# Patient Record
Sex: FEMALE | Race: WHITE | NOT HISPANIC OR LATINO | Employment: PART TIME | URBAN - METROPOLITAN AREA
[De-identification: names, ages, dates, MRNs, and addresses within clinical notes are randomized per-mention and may not be internally consistent; named-entity substitution may affect disease eponyms.]

---

## 2016-09-28 LAB — PAP-ABSTRACT: NORMAL

## 2017-01-17 DIAGNOSIS — E55.9 VITAMIN D DEFICIENCY: ICD-10-CM

## 2017-01-17 DIAGNOSIS — E53.8 VITAMIN B12 DEFICIENCY (NON ANEMIC): ICD-10-CM

## 2017-01-17 DIAGNOSIS — R53.83 FATIGUE, UNSPECIFIED TYPE: ICD-10-CM

## 2017-01-17 LAB
DEPRECATED CALCIDIOL+CALCIFEROL SERPL-MC: 55 UG/L (ref 20–75)
VIT B12 SERPL-MCNC: 865 PG/ML (ref 193–986)

## 2017-01-17 PROCEDURE — 36415 COLL VENOUS BLD VENIPUNCTURE: CPT | Performed by: INTERNAL MEDICINE

## 2017-01-17 PROCEDURE — 82306 VITAMIN D 25 HYDROXY: CPT | Performed by: INTERNAL MEDICINE

## 2017-01-17 PROCEDURE — 82607 VITAMIN B-12: CPT | Performed by: INTERNAL MEDICINE

## 2017-01-24 ENCOUNTER — OFFICE VISIT (OUTPATIENT)
Dept: INTERNAL MEDICINE | Facility: CLINIC | Age: 47
End: 2017-01-24
Payer: COMMERCIAL

## 2017-01-24 VITALS
HEIGHT: 66 IN | DIASTOLIC BLOOD PRESSURE: 70 MMHG | WEIGHT: 252 LBS | HEART RATE: 92 BPM | SYSTOLIC BLOOD PRESSURE: 108 MMHG | OXYGEN SATURATION: 97 % | BODY MASS INDEX: 40.5 KG/M2 | TEMPERATURE: 97.9 F

## 2017-01-24 DIAGNOSIS — R53.83 FATIGUE, UNSPECIFIED TYPE: Primary | ICD-10-CM

## 2017-01-24 DIAGNOSIS — Z01.84 IMMUNITY STATUS TESTING: ICD-10-CM

## 2017-01-24 DIAGNOSIS — E55.9 VITAMIN D DEFICIENCY: ICD-10-CM

## 2017-01-24 DIAGNOSIS — G47.19 DAYTIME HYPERSOMNOLENCE: ICD-10-CM

## 2017-01-24 DIAGNOSIS — R79.89 LOW SERUM VITAMIN C: ICD-10-CM

## 2017-01-24 DIAGNOSIS — E53.8 VITAMIN B12 DEFICIENCY (NON ANEMIC): ICD-10-CM

## 2017-01-24 PROCEDURE — 99214 OFFICE O/P EST MOD 30 MIN: CPT | Performed by: INTERNAL MEDICINE

## 2017-01-24 NOTE — NURSING NOTE
"Chief Complaint   Patient presents with     Fatigue     f/up on labs -vitamin b12/D/C       Initial /70 mmHg  Pulse 92  Temp(Src) 97.9  F (36.6  C) (Oral)  Ht 5' 6\" (1.676 m)  Wt 252 lb (114.306 kg)  BMI 40.69 kg/m2  SpO2 97% Estimated body mass index is 40.69 kg/(m^2) as calculated from the following:    Height as of this encounter: 5' 6\" (1.676 m).    Weight as of this encounter: 252 lb (114.306 kg).  BP completed using cuff size: regular    "

## 2017-01-24 NOTE — PROGRESS NOTES
"  SUBJECTIVE:                                                    Dalia Hart is a 46 year old female who presents to clinic today for the following health issues:      Concern - fatigue   Onset: years   Description:   Worse after eating carbs  Intensity: moderate  Progression of Symptoms:  same  Accompanying Signs & Symptoms:  none   Previous history of similar problem:   none  Precipitating factors:   Worsened by: not getting enough sleep   Alleviating factors:  Improved by: melatonin and  magnesium    Therapies Tried and outcome: melatonin and magnesium       Wakes up unrefreshed and feels sleepy during the day, but overall she reports that she has felt better with regards to fatigue and muscle aches and pains since her last office visit.    Other significant issues as outlined and addressed in the plan section of this note.      Problem list and histories reviewed & adjusted, as indicated.  Additional history: as documented    Labs reviewed in EPIC  Problem list, Medication list, Allergies, and Medical/Social/Surgical histories reviewed in [facility] and updated as appropriate.    ROS:  14 point ROS negative except as above      OBJECTIVE:                                                    /70 mmHg  Pulse 92  Temp(Src) 97.9  F (36.6  C) (Oral)  Ht 5' 6\" (1.676 m)  Wt 252 lb (114.306 kg)  BMI 40.69 kg/m2  SpO2 97%  Body mass index is 40.69 kg/(m^2).  GENERAL: healthy, alert and no distress  EYES: Eyes grossly normal to inspection, PERRL and conjunctivae and sclerae normal  NECK: no adenopathy, no asymmetry, masses, or scars and thyroid normal to palpation  RESP: lungs clear to auscultation - no rales, rhonchi or wheezes  CV: regular rate and rhythm, normal S1 S2, no S3 or S4, no murmur, click or rub, no peripheral edema and peripheral pulses strong  ABDOMEN: soft, nontender, no hepatosplenomegaly, no masses and bowel sounds normal  MS: no gross musculoskeletal defects noted, no edema    Diagnostic Test " Results:  Results for orders placed or performed in visit on 01/17/17   Vitamin D Deficiency   Result Value Ref Range    Vitamin D Deficiency screening 55 20 - 75 ug/L   Vitamin B12   Result Value Ref Range    Vitamin B12 865 193 - 986 pg/mL        ASSESSMENT/PLAN:                                                      DIAGNOSIS/PLAN:     ICD-10-CM    1. Fatigue, unspecified type R53.83 cholecalciferol (VITAMIN D3) 67663 UNITS capsule     Cyanocobalamin (B-12 SUPER STRENGTH) 5000 MCG/ML LIQD     calcium-vitamin D-vitamin K (VIACTIV) 500-500-40 MG-UNT-MCG CHEW     SLEEP EVALUATION & MANAGEMENT REFERRAL - ADULT   2. Low serum vitamin C R79.89 vitamin C-electrolytes (EMERGEN-C) 1000mg vitamin C super orange drink mix    WITH BORDERLINE B6 LEVEL   3. Vitamin D deficiency E55.9 cholecalciferol (VITAMIN D3) 68184 UNITS capsule     calcium-vitamin D-vitamin K (VIACTIV) 500-500-40 MG-UNT-MCG CHEW     Vitamin D Deficiency   4. Vitamin B12 deficiency (non anemic) E53.8 Cyanocobalamin (B-12 SUPER STRENGTH) 5000 MCG/ML LIQD     Vitamin B12   5. Daytime hypersomnolence G47.19 SLEEP EVALUATION & MANAGEMENT REFERRAL - ADULT   6. Immunity status testing Z01.84 Hepatitis C Screen Reflex to HCV RNA Quant and Genotype     Hepatitis B Surface Antibody     Hepatitis B core antibody     Hepatitis A Antibody IgG       SIGNIFICANT ISSUES RE The primary encounter diagnosis was Fatigue, unspecified type. Diagnoses of Low serum vitamin C, Vitamin D deficiency, Vitamin B12 deficiency (non anemic), Daytime hypersomnolence, and Immunity status testing were also pertinent to this visit. AS NOTED AND ADDRESSED ABOVE   MEDS AND AND LABS AS ORDERED TO ADDRESS PREVIOUS AND CURRENT ABNORMAL INDICES    STILL FEELS FATIGUED, DOES HAVE RISK FACTORS FOR [facility] - WILL ORDER SLEEP TEST    SEE PATIENT INSTRUCTION SECTION FOR FOLLOW UP PLAN    Dalia IS TO CONTINUE OTHER TREATMENT REGIMEN/PLANS EXCEPT AS INDICATED    COMPLIANCE WITH MEDICATIONS DIET AND EXERCISE  PLANS ENCOURAGED    DISCONTINUED MEDS:  Medications Discontinued During This Encounter   Medication Reason     vitamin C-electrolytes (EMERGEN-C) 1000mg vitamin C super orange drink mix Reorder     cholecalciferol (VITAMIN D3) 85526 UNITS capsule Reorder     Cyanocobalamin (B-12 SUPER STRENGTH) 5000 MCG/ML LIQD Reorder     calcium-vitamin D-vitamin K (VIACTIV) 500-500-40 MG-UNT-MCG CHEW Reorder       CURRENT MED LIST WITH CHANGES AS NOTED BELOW:  Current Outpatient Prescriptions   Medication Sig Dispense Refill     vitamin C-electrolytes (EMERGEN-C) 1000mg vitamin C super orange drink mix Mix 1 packet in 4-6oz water and take once daily INDICATION: VITAMIN C SUPPLEMENTION 90 packet PRN     cholecalciferol (VITAMIN D3) 94852 UNITS capsule SIG: TAKE 1 CAP TWICE A WEEK FOR 2 WEEKS, THEN ONE CAP EVERY OTHER WEEK, INDICATION: TO TREAT VITAMIN D DEFICIENCY (1ST AND 15TH OF EACH MONTH) 40 capsule 0     Cyanocobalamin (B-12 SUPER STRENGTH) 5000 MCG/ML LIQD Place 1 mL under the tongue every morning FOR VITAMIN B12 SUPPLEMENTATION, PLEASE PLACE UNDER THE TONGUE 2 Bottle PRN     calcium-vitamin D-vitamin K (VIACTIV) 500-500-40 MG-UNT-MCG CHEW Take 1 tablet by mouth 2 times daily (with meals) INDICATION: FOR BONE AND BREAST HEALTH 100 tablet PRN     omeprazole (PRILOSEC) 20 MG capsule Take 1 capsule (20 mg) by mouth every morning (before breakfast) INDICATION: TO CONTROL REFLUX SYMPTOMS 90 capsule 3     albuterol (ALBUTEROL) 108 (90 BASE) MCG/ACT inhaler Inhale 2 puffs into the lungs every 4 hours as needed for shortness of breath / dyspnea 1 Inhaler 0     nicotine polacrilex (NICORETTE) 2 MG gum 1 piece of gum every 1-2 hours as needed for smoking cessation 100 each 1     aspirin 81 MG chewable tablet Take 81 mg by mouth daily             Patient Instructions   ** FOLLOW UP PLAN**:    PLEASE SCHEDULE LABS WITH OFFICE VISIT 3 MONTHS FROM TODAY TO FOLLOW UP ON  FATIGUE, SLEEP STUDY RESULT, OTHER MEDICAL ISSUES, AND TO REVIEW TEST  RESULTS       BE SURE TO SCHEDULE YOUR LAB DRAW APPOINTMENT FOR AT LEAST 1 WEEK BEFORE YOUR NEXT VISIT      YOU HAVE BEEN REFERRED FOR SLEEP STUDY TO ADDRESS ISSUES RAISED TODAY   PLEASE  MAKE SURE TO SCHEDULE YOUR APPOINTMENT(S) BY TELEPHONE      YOU MAY CONTACT THE CLINIC IF ANY QUESTIONS OR CONCERNS -[phone] OR VIA RUBÉN Haile MD  [facility][1]

## 2017-01-24 NOTE — PROGRESS NOTES
Quick Note:    Dear Dalia,   Your recent test results were reviewed and are within acceptable limits. Please continue with other treatment, and follow up plans as discussed and do not hesitate to contact me with any questions or concerns.  Stay healthy!    Regards,  Dr. Mic [facility]    ______

## 2017-01-24 NOTE — MR AVS SNAPSHOT
After Visit Summary   1/24/2017    Dalia Hart    MRN: [id number]           Patient Information     Date Of Birth          1970        Visit Information        Provider Department      1/24/2017 4:30 PM Johnson Haile MD [facility]        Today's Diagnoses     Fatigue, unspecified type    -  1     Low serum vitamin C         Vitamin D deficiency         Vitamin B12 deficiency (non anemic)         Daytime hypersomnolence         Immunity status testing           Care Instructions    ** FOLLOW UP PLAN**:    PLEASE SCHEDULE LABS WITH OFFICE VISIT 3 MONTHS FROM TODAY TO FOLLOW UP ON  FATIGUE, SLEEP STUDY RESULT, OTHER MEDICAL ISSUES, AND TO REVIEW TEST RESULTS       BE SURE TO SCHEDULE YOUR LAB DRAW APPOINTMENT FOR AT LEAST 1 WEEK BEFORE YOUR NEXT VISIT      YOU HAVE BEEN REFERRED FOR SLEEP STUDY TO ADDRESS ISSUES RAISED TODAY   PLEASE  MAKE SURE TO SCHEDULE YOUR APPOINTMENT(S) BY TELEPHONE      YOU MAY CONTACT THE CLINIC IF ANY QUESTIONS OR CONCERNS -[phone] OR [facility]IA [facility]                   Follow-ups after your visit        Additional Services     SLEEP EVALUATION & MANAGEMENT REFERRAL - ADULT       Please be aware that coverage of these services is subject to the terms and limitations of your health insurance plan.  Call member services at your health plan with any benefit or coverage questions.      Please bring the following to your appointment:    >>   List of current medications   >>   This referral request   >>   Any documents/labs given to you for this referral    [facility]   [phone] (Age 18 and up)                  Future tests that were ordered for you today     Open Future Orders        Priority Expected Expires Ordered    Vitamin D Deficiency Routine 1/24/2017 7/24/2017 1/24/2017    Vitamin B12 Routine 1/24/2017 7/24/2017 1/24/2017    Hepatitis C Screen Reflex to HCV RNA Quant and Genotype Routine 1/24/2017 7/24/2017  "1/24/2017    Hepatitis B Surface Antibody Routine 1/24/2017 7/24/2017 1/24/2017    Hepatitis B core antibody Routine 1/24/2017 7/24/2017 1/24/2017    Hepatitis A Antibody IgG Routine 1/24/2017 7/24/2017 1/24/2017    SLEEP EVALUATION & MANAGEMENT REFERRAL - ADULT Routine  1/24/2018 1/24/2017            Who to contact     If you have questions or need follow up information about today's clinic visit or your schedule please contact [facility] directly at [phone].  Normal or non-critical lab and imaging results will be communicated to you by [facility], letter or phone within 4 business days after the clinic has received the results. If you do not hear from us within 7 days, please contact the clinic through [facility] or phone. If you have a critical or abnormal lab result, we will notify you by phone as soon as possible.  Submit refill requests through [employer] or call your pharmacy and they will forward the refill request to us. Please allow 3 business days for your refill to be completed.          Additional Information About Your Visit        MyChart Information     [employer] gives you secure access to your electronic health record. If you see a primary care provider, you can also send messages to your care team and make appointments. If you have questions, please call your primary care clinic.  If you do not have a primary care provider, please call [phone] and they will assist you.        Care EveryWhere ID     This is your Care EveryWhere ID. This could be used by other organizations to access your [facility] medical records  [id number]        Your Vitals Were     Pulse Temperature Height BMI (Body Mass Index) Pulse Oximetry       92 97.9  F (36.6  C) (Oral) 5' 6\" (1.676 m) 40.69 kg/m2 97%        Blood Pressure from Last 3 Encounters:   01/24/17 108/70   08/29/16 130/78   06/23/16 104/60    Weight from Last 3 Encounters:   01/24/17 252 lb (114.306 kg)   08/29/16 242 lb 12.8 oz (110.133 " kg)   06/23/16 246 lb 14.4 oz (111.993 kg)                 Where to get your medicines      These medications were sent to [facility] [place], [place] - [address].  [address] [address]     Phone:  [phone]    - calcium-vitamin D-vitamin K 500-500-40 MG-UNT-MCG Chew  - cholecalciferol 58013 UNITS capsule  - Cyanocobalamin 5000 MCG/ML Liqd  - vitamin C-electrolytes 1000mg vitamin C super orange drink mix       Primary Care Provider Office Phone # Fax #    Johnson Haile [phone]       [facility] [address] [facility] [address]        Thank you!     Thank you for choosing [facility]  for your care. Our goal is always to provide you with excellent care. Hearing back from our patients is one way we can continue to improve our services. Please take a few minutes to complete the written survey that you may receive in the mail after your visit with us. Thank you!             Your Updated Medication List - Protect others around you: Learn how to safely use, store and throw away your medicines at www.disposemymeds.org.          This list is accurate as of: 1/24/17  5:41 PM.  Always use your most recent med list.                   Brand Name Dispense Instructions for use    albuterol 108 (90 BASE) MCG/ACT Inhaler    albuterol    1 Inhaler    Inhale 2 puffs into the lungs every 4 hours as needed for shortness of breath / dyspnea       aspirin 81 MG chewable tablet      Take 81 mg by mouth daily       calcium-vitamin D-vitamin K 500-500-40 MG-UNT-MCG Chew    VIACTIV    100 tablet    Take 1 tablet by mouth 2 times daily (with meals) INDICATION: FOR BONE AND BREAST HEALTH       cholecalciferol 96735 UNITS capsule    VITAMIN D3    40 capsule    SIG: TAKE 1 CAP TWICE A WEEK FOR 2 WEEKS, THEN ONE CAP EVERY OTHER WEEK, INDICATION: TO TREAT VITAMIN D DEFICIENCY (1ST AND 15TH OF EACH MONTH)       Cyanocobalamin 5000 MCG/ML Liqd     B-12 SUPER STRENGTH    2 Bottle    Place 1 mL under the tongue every morning FOR VITAMIN B12 SUPPLEMENTATION, PLEASE PLACE UNDER THE TONGUE       nicotine polacrilex 2 MG gum    NICORETTE    100 each    1 piece of gum every 1-2 hours as needed for smoking cessation       omeprazole 20 MG CR capsule    priLOSEC    90 capsule    Take 1 capsule (20 mg) by mouth every morning (before breakfast) INDICATION: TO CONTROL REFLUX SYMPTOMS       vitamin C-electrolytes 1000mg vitamin C super orange drink mix    EMERGEN-C    90 packet    Mix 1 packet in 4-6oz water and take once daily INDICATION: VITAMIN C SUPPLEMENTION

## 2017-02-03 ENCOUNTER — TELEPHONE (OUTPATIENT)
Dept: NURSING | Facility: CLINIC | Age: 47
End: 2017-02-03

## 2017-02-03 ENCOUNTER — TELEPHONE (OUTPATIENT)
Dept: INTERNAL MEDICINE | Facility: CLINIC | Age: 47
End: 2017-02-03

## 2017-02-03 DIAGNOSIS — Z20.828 EXPOSURE TO THE FLU: Primary | ICD-10-CM

## 2017-02-03 RX ORDER — OSELTAMIVIR PHOSPHATE 75 MG/1
75 CAPSULE ORAL DAILY
Qty: 10 CAPSULE | Refills: 0 | Status: SHIPPED | OUTPATIENT
Start: 2017-02-03 | End: 2017-03-07

## 2017-02-03 NOTE — TELEPHONE ENCOUNTER
Reason for Call:  Medication or medication refill:    Do you use a [facility]?  Name of the pharmacy and phone number for the current request:  [facility] [address] [place] [address] - [phone]    Name of the medication requested: Tamiflu - Child tested positive for flu today and she would like a preventive RX    Other request: Please do as soon as possible    Can we leave a detailed message on this number? YES    Phone number patient can be reached at: Cell number on file:    Telephone Information:   Mobile [phone]       Best Time: Any time    Call taken on 2/3/2017 at 4:23 PM by Rayna Jernigan

## 2017-02-04 NOTE — TELEPHONE ENCOUNTER
"Call Type: Triage Call    Presenting Problem: Mom calling\" My daughter was dx with the flu and  I had called the clinic earlier regarding tamiflu, not sure if I  need it. I do not have any sx. \" Denies sx. Gave over view on  influenza. Your RX has been called in per epic if you need it.  Triage Note:  Guideline Title: Flu-Like Symptoms  Recommended Disposition: Provide Home/Self Care  Original Inclination: Wanted to speak with a nurse  Override Disposition:  Intended Action: Follow advice given  Physician Contacted: No  Has questions/concerns about exposure to influenza ?  YES  Signs of dehydration ? NO  Severe breathing problems ? NO  Breathing problems ? NO  Dry or minimally productive cough for up to three weeks after initial symptoms ? NO  Sudden change in mental status ? NO  Choking sensation, cannot swallow own saliva with associated drooling and soft  muffled voice ? NO  New onset of eye redness, irritation/foreign body sensation or gritty feeling with  watery or sticky mucus drainage ? NO  Temperature of 101.5 F (38.6 C) or greater that has not responded to 24 hours of  home care measures ? NO  Diagnosed with influenza by provider AND has questions/concerns ? NO  New onset of stiff neck causing pain with forward head movement, severe  generalized headache, fever, or altered mental status ? NO  Any temperature elevation in an individual with a weakened immune system OR a  frail elderly person ? NO  Flu-like symptoms associated with a cough and breathing that is becoming more  difficult ? NO  Evaluated by provider AND symptoms worsening when following recommended treatment  plan for 48 hours ? NO  New OR worsening flu-like illness AND in high risk group for complications ? NO  In high risk group for complications of influenza AND has questions/concerns ? NO  Flu-like illness that has not improved after 7 days of home care ? NO  Flu-like illness AND not in a high risk group ? NO  Gradual onset of upper " respiratory illness signs and symptoms(If there is any  doubt that symptoms may be an upper respiratory illness, use this guideline,  Flu-Like Symptoms ) ? NO  Being treated by a provider for a secondary infection AND no improvement in  symptoms, symptoms have worsened OR has new symptoms after following treatment  plan for the time specified by provider. ? NO  Severe headache not relieved with 8 hours of home care ? NO  New productive cough with thick colored sputum (other than clear or white sputum;  not postnasal drainage) ? NO  Pressure/pain above or below eyes, near ears over sinuses (may also be described  as fullness, worsens when bending forward, teeth or eye pain) ? NO  Physician Instructions:  Care Advice: HEALTH PROMOTION / MAINTENANCE  To help prevent a widespread community outbreak of the flu, call the call  center, your clinic or provider's office to discuss any questions or  concerns before going in unless you have severe respiratory or any nervous  system symptoms.  Influenza - Expected Course: - Symptoms start to improve in 3 to 7 days. -  Cough and feeling tired (malaise) may continue for several weeks. - Cough  and extreme fatigue lasting more than 3 weeks needs medical evaluation. -  Remain at home at least 24 hours after being free of fever 100F (37.8C)  without the use of fever reducing medication.  Do not go to work, school, or any community activity until you have not had  a fever (100F or 37.8C) for at least 24 hours without taking fever-reducing  medication.  This means staying at home for at least 3 to 5, possibly 7  days.  Antiviral medications - Prescribed medications are available in pills,  liquids or inhaled powder that help prevent or lessen symptoms of viral  illnesses. Antivirals are usually given to persons at a higher risk for  flu-related complications or who are very ill. Most healthy people do not  need to be treated with antiviral drugs. - Recommended medications for  use  against the most recently circulating influenza viruses:  oseltamivir  (Tamiflu) and zanamivir (Relenza). - Work best when started within the  first two days of symptoms and continue for at least 5 days after exposure.  - Speak with your provider as soon as possible if exposed to the flu or if  you have new symptoms of the flu.  Influenza - Transmission: - Flu virus is spread through the air in droplets  when a flu-infected person coughs, sneezes or talks and another person  breathes in the droplets, or by touching a surface like a door knob,  telephone, or keyboard that has been contaminated by the droplets and then  touching the mouth, nose, or eyes. - An individual may be passing on the  flu before they have any symptoms. - An individual may continue to be  contagious with the flu for up to 7 days after the symptoms start.  Influenza Prevention - Personal Hygiene: - Wash your hands with soap and  water often, for at least 20 seconds, especially after you cough or sneeze  or when you have touched a contaminated surface/object (door knob,  telephone, etc.) - If soap and water are not available, use an  alcohol-based hand  containing at least 60% alcohol, rub hands  together until hands are dry. - Avoid putting your hands and fingers to the  face, nose, mouth or eyes. - During the flu season avoid crowded places  (shopping areas, planes, sporting events).

## 2017-03-07 ENCOUNTER — CARE COORDINATION (OUTPATIENT)
Dept: SLEEP MEDICINE | Facility: CLINIC | Age: 47
End: 2017-03-07

## 2017-03-07 ENCOUNTER — OFFICE VISIT (OUTPATIENT)
Dept: SLEEP MEDICINE | Facility: CLINIC | Age: 47
End: 2017-03-07
Payer: COMMERCIAL

## 2017-03-07 VITALS
HEART RATE: 68 BPM | WEIGHT: 250 LBS | OXYGEN SATURATION: 96 % | TEMPERATURE: 98.4 F | DIASTOLIC BLOOD PRESSURE: 69 MMHG | SYSTOLIC BLOOD PRESSURE: 109 MMHG | BODY MASS INDEX: 40.18 KG/M2 | HEIGHT: 66 IN

## 2017-03-07 DIAGNOSIS — G47.19 EXCESSIVE DAYTIME SLEEPINESS: ICD-10-CM

## 2017-03-07 DIAGNOSIS — R06.83 SNORING: Primary | ICD-10-CM

## 2017-03-07 PROCEDURE — 99244 OFF/OP CNSLTJ NEW/EST MOD 40: CPT | Performed by: INTERNAL MEDICINE

## 2017-03-07 ASSESSMENT — PAIN SCALES - GENERAL: PAINLEVEL: NO PAIN (0)

## 2017-03-07 NOTE — PROGRESS NOTES
Patient will call to schedule psg once she knows her schedule, she is thinking about April. She will let the sleep center know.

## 2017-03-07 NOTE — PATIENT INSTRUCTIONS
"MY TREATMENT INFORMATION FOR SLEEP APNEA-  Dalia Hart    DOCTOR : Zaki Cervantes MD  SLEEP CENTER :      MY CONTACT NUMBER:       If I haven't had a sleep study yet, what can I expect?  A personal story from Jacob  https://www.m[employer]M Strategiesube.com/watch?v=AxPLmlRpnCs    Am I having a home sleep study?  Here is a video in case you get home and want to make sure you have done it correctly  https://www.[employer].com/watch?v=PEG8M4mDxa3&feature=youtu.be    Frequently asked questions:  1. What is Obstructive Sleep Apnea (TIERRA)? TIERRA is the most common type of sleep apnea. Apnea literally means, \"without breath.\" It is characterized by repetitive pauses in breathing, despite continued effort to breathe, and is usually associated with a reduction in blood oxygen saturation. Apneas can last 10 to over 60 seconds. It is caused by narrowing or collapse of the upper airway as muscles relax during sleep. Severity of sleep apnea is determined by frequency of breathing events and their effect on your sleep and oxygen levels determined during sleep testing.   2. What are the consequences of TIERRA? Symptoms include: daytime sleepiness- possibly increasing the risk of falling asleep while driving, unrefreshing/restless sleep, snoring, insomnia, waking frequently to urinate, waking with heartburn or reflux, reduced concentration and memory, and morning headaches. Other health consequences may include development of high blood pressure and other cardiovascular disease in persons who are susceptible. Untreated TIERRA  can contribute to heart disease, stroke and diabetes.   3. What are the treatment options? In most situations, sleep apnea is a lifelong disease that must be managed with daily therapy. Medications are not effective for sleep apnea and surgery is generally not performed until other therapies have been tried. Therapy is usually tailored to the individual patient based on many factors including your wishes as well as severity of sleep " apnea and severity of obesity. Continuous Positive Airway (CPAP) is the most reliable treatment. An oral device to hold your jaw forward is usually the next most reliable option. Other options include postioning devices (to keep you off your back), weight loss, and surgery including a tongue pacing device. There is more detail about some of these options below.            1. CPAP-  WHAT DOES IT DO AND HOW CAN I LEARN TO WEAR IT?                               BEFORE I START, CAN I WATCH A MOVIE TO GET A PLAN ON HOW TO USE CPAP?  https://www.[employer] Its [employer].com/watch?q=j5O75bd590Z      Continuous positive airway pressure, or CPAP, is the most effective treatment for obstructive sleep apnea. It works by blowing room air, through a mask, to hold your throat open. A decision to use CPAP is a major step forward in the pursuit of a healthier life. The successful use of CPAP will help you breathe easier, sleep better and live healthier. You can choose CPAP equipment from any durable medical equipment provider that meets your needs.  Using CPAP can be a positive experience if you keep these dickey points in mind:  1. Commitment  CPAP is not a quick fix for your problem. It involves a long-term commitment to improve your sleep and your health.    2. Communication  Stay in close communication with both your sleep doctor and your CPAP supplier. Ask lots of questions and seek help when you need it.    3. Consistency  Use CPAP all night, every night and for every nap. You will receive the maximum health benefits from CPAP when you use it every time that you sleep. This will also make it easier for your body to adjust to the treatment.    4. Correction  The first machine and mask that you try may not be the best ones for you. Work with your sleep doctor and your CPAP supplier to make corrections to your equipment selection. Ask about trying a different type of machine or mask if you have ongoing problems. Make sure that your mask is a good  "fit and learn to use your equipment properly.    5. Challenge  Tell a family member or close friend to ask you each morning if you used your CPAP the previous night. Have someone to challenge you to give it your best effort.    6. Connection   Your adjustment to CPAP will be easier if you are able to connect with others who use the same treatment. Ask your sleep doctor if there is a support group in your area for people who have sleep apnea, or look for one on the Internet.  7. Comfort   Increase your level of comfort by using a saline spray, decongestant or heated humidifier if CPAP irritates your nose, mouth or throat. Use your unit's \"ramp\" setting to slowly get used to the air pressure level. There may be soft pads you can buy that will fit over your mask straps. Look on www.CPAP.com for accessories that can help make CPAP use more comfortable.  8. Cleaning   Clean your mask, tubing and headgear on a regular basis. Put this time in your schedule so that you don't forget to do it. Check and replace the filters for your CPAP unit and humidifier.    9. Completion   Although you are never finished with CPAP therapy, you should reward yourself by celebrating the completion of your first month of treatment. Expect this first month to be your hardest period of adjustment. It will involve some trial and error as you find the machine, mask and pressure settings that are right for you.    10. Continuation  After your first month of treatment, continue to make a daily commitment to use your CPAP all night, every night and for every nap.    CPAP-Tips to starting with success:  Begin using your CPAP for short periods of time during the day while you watch TV or read.    Use CPAP every night and for every nap. Using it less often reduces the health benefits and makes it harder for your body to get used to it.    Make small adjustments to your mask, tubing, straps and headgear until you get the right fit. Tightening the mask " may actually worsen the leak.  If it leaves significant marks on your face or irritates the bridge of your nose, it may not be the best mask for you.  Speak with the person who supplied the mask and consider trying other masks. Insurances will allow you to try different masks during the first month of starting CPAP.  Insurance also covers a new mask, hose and filter about every 6 months.    Use a saline nasal spray to ease mild nasal congestion. Neti-Pot or saline nasal rinses may also help. Nasal gel sprays can help reduce nasal dryness.  Biotene mouthwash can be helpful to protect your teeth if you experience frequent dry mouth.  Dry mouth may be a sign of air escaping out of your mouth or out of the mask in the case of a full face mask.  Speak with your provider if you expect that is the case.     Take a nasal decongestant to relieve more severe nasal or sinus congestion.  Do not use Afrin (oxymetazoline) nasal spray more than 3 days in a row.  Speak with your sleep doctor if your nasal congestion is chronic.    Use a heated humidifier that fits your CPAP model to enhance your breathing comfort. Adjust the heat setting up if you get a dry nose or throat, down if you get condensation in the hose or mask.  Position the CPAP lower than you so that any condensation in the hose drains back into the machine rather than towards the mask.    Try a system that uses nasal pillows if traditional masks give you problems.    Clean your mask, tubing and headgear once a week. Make sure the equipment dries fully.    Regularly check and replace the filters for your CPAP unit and humidifier.    Work closely with your sleep provider and your CPAP supplier to make sure that you have the machine, mask and air pressure setting that works best for you. It is better to stop using it and call your provider to solve problems than to lay awake all night frustrated with the device.    BESIDES CPAP, WHAT OTHER THERAPIES ARE  THERE?      Positioning Device  Positioning devices are generally used when sleep apnea is mild and only occurs on your back.This example shows a pillow that straps around the waist. It may be appropriate for those whose sleep study shows milder sleep apnea that occurs primarily when lying flat on one's back. Preliminary studies have shown benefit but effectiveness at home may need to be verified by a home sleep test. These devices are generally not covered by medical insurance.                      Oral Appliance  What is oral appliance therapy?  An oral appliance is a small acrylic device that fits over the upper and lower teeth or tongue (similar to an orthodontic retainer or a mouth guard). This device slightly advances the lower jaw or tongue, which moves the base of the tongue forward, opens the airway, improves breathing and can effectively treat snoring and obstructive sleep apnea sleep apnea. The appliance is fabricated and customized by a qualified dentist with experience in treating snoring and sleep apnea. Oral appliances are usually well tolerated and have relatively high compliance by patients1, 2, 3.  When is an oral appliance indicated?  Oral appliance therapy is recommended as a first-line treatment for patients with primary snoring, mild sleep apnea, and for patients with moderate sleep apnea who prefer appliance therapy to use of CPAP4, 5. Severity of sleep apnea is determined by sleep testing and is based on the number of respiratory events per hour of sleep.   How successful is oral appliance therapy?  The success rate of oral appliance therapy in patients with mild sleep apnea is 75-80% while in patients with moderate sleep apnea it is 50-70%. The chance of success in patients with severe sleep apnea is 40-50%. The research also shows that oral appliances have a beneficial effect on the cardiovascular health of TIERRA patients at the same magnitude as CPAP therapy7.  Oral appliances should be a  second-line treatment in cases of severe sleep apnea, but if not completely successful then a combination therapy utilizing CPAP plus oral appliance therapy may be effective. Oral appliances tend to be effective in a broad range of patients although studies show that the patients who have the highest success are females, younger patients, those with milder disease, and less severe obesity. 3, 6.   The chances of success are lower in patients who have more severe TIERRA, are older, and those who are morbidly obese.     Example of an oral appliance   Finding a dentist that practices dental sleep medicine  Specific training is available through the American Academy of Dental Sleep Medicine for dentists interested in working in the field of sleep. To find a dentist who is educated in the field of sleep and the use of oral appliances, near you, visit the Web site of the American Academy of Dental Sleep Medicine; also see   http://www.accpstorage.org/newOrganization/patients/oralAppliances.pdf  To search for a dentist certified in these practices:  Http://aadsm.org/FindADentist.aspx?1  1. David et al. Objectively measured vs self-reported compliance during oral appliance therapy for sleep-disordered breathing. Chest 2013; 144(5): 5290-3739.  2. Elliot, et al. Objective measurement of compliance during oral appliance therapy for sleep-disordered breathing. Thorax 2013; 68(1): 91-96.  3. Tiarra, et al. Mandibular advancement devices in 620 men and women with TIERRA and snoring: tolerability and predictors of treatment success. Chest 2004; 125: 9084-6730.  4. Yaquelin, et al. Oral appliances for snoring and TIERRA: a review. Sleep 2006; 29: 244-262.  5. Mya et al. Oral appliance treatment for TIERRA: an update. J Clin Sleep Med 2014; 10(2): 215-227.  6. Ilya et al. Predictors of OSAH treatment outcome. J Dent Res 2007; 86: 9452-0485.      Weight Loss:    Weight management is a personal decision.  If you are  interested in exploring weight loss strategies, the following discussion covers the impact on weight loss on sleep apnea and the approaches that may be successful.    Weight loss decreases severity of sleep apnea in most people with obesity. For those with mild obesity who have developed snoring with weight gain, even 15-30 pound weight loss can improve and occasionally eliminate sleep apnea.  Structured and life-long dietary and health habits are necessary to lose weight and keep healthier weight levels.     Though there may be significant health benefits from weight loss, long-term weight loss is very difficult to achieve- studies show success with dietary management in less than 10% of people. In addition, substantial weight loss may require years of dietary control and may be difficult if patients have severe obesity. In these cases, surgical management may be considered.  Finally, older individuals who have tolerated obesity without health complications may be less likely to benefit from weight loss strategies.    Your BMI is Body mass index is 40.35 kg/(m^2).  Body mass index (BMI) is one way to tell whether you are at a healthy weight, overweight, or obese. It measures your weight in relation to your height.  A BMI of 18.5 to 24.9 is in the healthy range. A person with a BMI of 25 to 29.9 is considered overweight, and someone with a BMI of 30 or greater is considered obese. More than two-thirds of American adults are considered overweight or obese.  Being overweight or obese increases the risk for further weight gain. Excess weight may lead to heart disease and diabetes.  Creating and following plans for healthy eating and physical activity may help you improve your health.  Weight control is part of healthy lifestyle and includes exercise, emotional health, and healthy eating habits. Careful eating habits lifelong are the mainstay of weight control. Though there are significant health benefits from weight  loss, long-term weight loss with diet alone may be very difficult to achieve- studies show long-term success with dietary management in less than 10% of people. Attaining a healthy weight may be especially difficult to achieve in those with severe obesity. In some cases, medications, devices and surgical management might be considered.  What can you do?  If you are overweight or obese and are interested in methods for weight loss, you should discuss this with your provider.     Consider reducing daily calorie intake by 500 calories.     Keep a food journal.     Avoiding skipping meals, consider cutting portions instead.    Diet combined with exercise helps maintain muscle while optimizing fat loss. Strength training is particularly important for building and maintaining muscle mass. Exercise helps reduce stress, increase energy, and improves fitness. Increasing exercise without diet control, however, may not burn enough calories to loose weight.       Start walking three days a week 10-20 minutes at a time    Work towards walking thirty minutes five days a week     Eventually, increase the speed of your walking for 1-2 minutes at time    In addition, we recommend that you review healthy lifestyles and methods for weight loss available through the National Institutes of Health patient information sites:  http://win.niddk.nih.gov/publications/index.htm    And look into health and wellness programs that may be available through your health insurance provider, employer, local community center, or elio club.    Weight management plan: Patient was referred to their PCP to discuss a diet and exercise plan.    Surgery:    Upper Airway Surgery for TIERRA  Surgery for TIERRA is a second-line treatment option in the management of sleep apnea.  Surgery should be considered for patients who are having a difficult time tolerating CPAP.    Surgery for TIERRA is directed at areas that are responsible for narrowing or complete obstruction  of the airway during sleep.  There are a wide range of procedures available to enlarge and/or stabilize the airway to prevent blockage of breathing in the three major areas where it can occur: the palate, tongue, and nasal regions.  Successful surgical treatment depends on the accurate identification of the factors responsible for obstructive sleep apnea in each person.  A personalized approach is required because there is no single treatment that works well for everyone.  Because of anatomic variation, consultation with an examination by a sleep surgeon is a critical first step in determining what surgical options are best for each patient.  In some cases, examination during sedation may be recommended in order to guide the selection of procedures.  Patients will be counseled about risks and benefits as well as the typical recovery course after surgery. Surgery is typically not a cure for a person s TIERRA.  However, surgery will often significantly improve one s TIERRA severity (termed  success rate ).  Even in the absence of a cure, surgery will decrease the cardiovascular risk associated with OSA7; improve overall quality of life8 (sleepiness, functionality, sleep quality, etc).          Palate Procedures:  Patients with TIERRA often have narrowing of their airway in the region of their tonsils and uvula.  The goals of palate procedures are to widen the airway in this region as well as to help the tissues resist collapse.  Modern palate procedure techniques focus on tissue conservation and soft tissue rearrangement, rather than tissue removal.  Often the uvula is preserved in this procedure. Residual sleep apnea is common in patient after pharyngoplasty with an average reduction in sleep apnea events of 33%2.      Tongue Procedures:  While patients are awake, the muscles that surround the throat are active and keep this region open for breathing. These muscles relax during sleep, allowing the tongue and other structures  to collapse and block breathing.  There are several different tongue procedures available.  Selection of a tongue base procedure depends on characteristics seen on physical exam.  Generally, procedures are aimed at removing bulky tissues in this area or preventing the back of the tongue from falling back during sleep.  Success rates for tongue surgery range from 50-62%3.    Hypoglossal Nerve Stimulation:  Hypoglossal nerve stimulation has recently received approval from the [place] Food and Drug Administration for the treatment of obstructive sleep apnea.  This is based on research showing that the system was safe and effective in treating sleep apnea6.  Results showed that the median AHI score decreased 68%, from 29.3 to 9.0. This therapy uses an implant system that senses breathing patterns and delivers mild stimulation to airway muscles, which keeps the airway open during sleep.  The system consists of three fully implanted components: a small generator (similar in size to a pacemaker), a breathing sensor, and a stimulation lead.  Using a small handheld remote, a patient turns the therapy on before bed and off upon awakening.    Candidates for this device must be greater than 22 years of age, have moderate to severe TIERRA (AHI between 20-65), BMI less than 32, have tried CPAP/oral appliance without success, and have appropriate upper airway anatomy (determined by a sleep endoscopy performed by Dr. Mccray).    Hypoglossal Nerve Stimulation Pathway:    The sleep surgeon s office will work with the patient through the insurance prior-authorization process (including communications and appeals).    Nasal Procedures:  Nasal obstruction can interfere with nasal breathing during the day and night.  Studies have shown that relief of nasal obstruction can improve the ability of some patients to tolerate positive airway pressure therapy for obstructive sleep apnea1.  Treatment options include medications such as nasal  saline, topical corticosteroid and antihistamine sprays, and oral medications such as antihistamines or decongestants. Non-surgical treatments can include external nasal dilators for selected patients. If these are not successful by themselves, surgery can improve the nasal airway either alone or in combination with these other options.      Combination Procedures:  Combination of surgical procedures and other treatments may be recommended, particularly if patients have more than one area of narrowing or persistent positional disease.  The success rate of combination surgery ranges from 66-80%2,3.      1. Pascale BROWN. The Role of the Nose in Snoring and Obstructive Sleep Apnoea: An Update.  Eur Arch Otorhinolaryngol. 2011; 268: 1365-73.  2.  Constanza SM; Lauren JA; Roc JR; Pallanch JF; Brissa MB; Andrew SG; Dayan CAO. Surgical modifications of the upper airway for obstructive sleep apnea in adults: a systematic review and meta-analysis. SLEEP 2010;33(10):4447-9471. Charlee BRONSON. Hypopharyngeal surgery in obstructive sleep apnea: an evidence-based medicine review.  Arch Otolaryngol Head Neck Surg. 2006 Feb;132(2):206-13.  3. Andrzej YH1, Mikal Y, Maurilio MARTHA. The efficacy of anatomically based multilevel surgery for obstructive sleep apnea. Otolaryngol Head Neck Surg. 2003 Oct;129(4):327-35.  4. Charlee BRONSON, Goldberg A. Hypopharyngeal Surgery in Obstructive Sleep Apnea: An Evidence-Based Medicine Review. Arch Otolaryngol Head Neck Surg. 2006 Feb;132(2):206-13.  5. Graciela WHEAT et al. Upper-Airway Stimulation for Obstructive Sleep Apnea.  N Engl J Med. 2014 Jan 9;370(2):139-49.  6. Amanda Y et al. Increased Incidence of Cardiovascular Disease in Middle-aged Men with Obstructive Sleep Apnea. Am J Respir Crit Care Med; 2002 166: 159-165  7. Aniya TYSON et al. Studying Life Effects and Effectiveness of Palatopharyngoplasty (SLEEP) study: Subjective Outcomes of Isolated Uvulopalatopharyngoplasty. Otolaryngol Head Neck Surg. 2011;  144: 623631.

## 2017-03-07 NOTE — NURSING NOTE
"Chief Complaint   Patient presents with     Sleep Problem     consult       Initial /69 (BP Location: Right arm, Patient Position: Chair, Cuff Size: Adult Regular)  Pulse 68  Temp 98.4  F (36.9  C) (Oral)  Ht 1.676 m (5' 6\")  Wt 113.4 kg (250 lb)  SpO2 96%  BMI 40.35 kg/m2 Estimated body mass index is 40.35 kg/(m^2) as calculated from the following:    Height as of this encounter: 1.676 m (5' 6\").    Weight as of this encounter: 113.4 kg (250 lb).  Medication Reconciliation: complete   Neck Circumference: 39 CM  Ess:18  Kylie Lara MA  [facility] [place]        "

## 2017-03-07 NOTE — PROGRESS NOTES
Sleep Consultation:    Date on this visit: 3/7/2017    Dalia Hart is sent by  Johnson Haile MD  for a sleep consultation regarding sleep apnea .    Primary Physician: Johnson Haile     CHIEF COMPLAINT:  Excessive daytime sleepiness.      HISTORY OF PRESENT ILLNESS:  Ms. Dalia Hart is a 46-year-old female who presents with a long-standing history of excessive daytime sleepiness.  The patient reports that her sleepiness was a problem even in her 20s.  She, however, added that she was probably having insufficient sleep in her 20s owing to her schedule of being in school, working and spending nights out socializing.  She feels the need to nap almost every day.  Her fatigue and sleepiness was more of a problem when she was working fixed hours.  At this time, she mostly works from home and dictates her own schedule.  She tries to take a 30 minute to 2 hour a nap almost every day.  Her naps are refreshing for her.      The patient sleeps alone but has been told that she has snoring.  She is not aware of snort arousals, gasping or choking episodes in her sleep or witnessed apneas.  She goes to bed usually at 10:30 p.m. and can fall asleep easily at this time.  She wakes up once at night to use the bathroom.  She may take an hour to return to sleep.  In the morning she wakes up between 6:00 and 7:00 a.m.  She feels tired on waking up.  She denies having morning headaches.  She feels tired and sleepy in the daytime and rated her [place] Sleepiness Scale score at 18/24.  She denies any history of any impairment while driving.  There is no history of motor vehicle accidents or near-misses owing to drowsy driving.      The patient denies having restless legs symptoms.  There is no history of dream enactment behavior or other parasomnias.      She denies having cataplexy symptoms.  She denies hypnagogic hallucinations or sleep paralysis.      The patient drinks up to 6 cups of coffee daily.  Her last caffeine  intake is set at 5:00 p.m.     PAST MEDICAL HISTORY:  Nothing significant.      FAMILY HISTORY:  Her mother had sleep apnea.  Her mother  of liver failure.  Her daughter was diagnosed with sleep apnea at age 4 and had a tonsillectomy.      SOCIAL HISTORY:  She has a history of smoking.  She does not drink alcohol.       Allergies:    Allergies   Allergen Reactions     Seasonal Allergies        Medications:    Current Outpatient Prescriptions   Medication Sig Dispense Refill     vitamin C-electrolytes (EMERGEN-C) 1000mg vitamin C super orange drink mix Mix 1 packet in 4-6oz water and take once daily INDICATION: VITAMIN C SUPPLEMENTION 90 packet PRN     cholecalciferol (VITAMIN D3) 32057 UNITS capsule SIG: TAKE 1 CAP TWICE A WEEK FOR 2 WEEKS, THEN ONE CAP EVERY OTHER WEEK, INDICATION: TO TREAT VITAMIN D DEFICIENCY ( AND  OF EACH MONTH) 40 capsule 0     Cyanocobalamin (B-12 SUPER STRENGTH) 5000 MCG/ML LIQD Place 1 mL under the tongue every morning FOR VITAMIN B12 SUPPLEMENTATION, PLEASE PLACE UNDER THE TONGUE 2 Bottle PRN     calcium-vitamin D-vitamin K (VIACTIV) 500-500-40 MG-UNT-MCG CHEW Take 1 tablet by mouth 2 times daily (with meals) INDICATION: FOR BONE AND BREAST HEALTH 100 tablet PRN     aspirin 81 MG chewable tablet Take 81 mg by mouth daily       omeprazole (PRILOSEC) 20 MG capsule Take 1 capsule (20 mg) by mouth every morning (before breakfast) INDICATION: TO CONTROL REFLUX SYMPTOMS (Patient not taking: Reported on 3/7/2017) 90 capsule 3     nicotine polacrilex (NICORETTE) 2 MG gum 1 piece of gum every 1-2 hours as needed for smoking cessation (Patient not taking: Reported on 3/7/2017) 100 each 1       Problem List:  Patient Active Problem List    Diagnosis Date Noted     Daytime hypersomnolence 2017     Priority: Medium     Low serum vitamin C 2016     Priority: Medium     WITH BORDERLINE B6 LEVEL       Vitamin B12 deficiency (non anemic) 2016     Priority: Medium      Hyperlipidemia with target LDL less than 160 06/23/2016     Priority: Medium     Hyperparathyroidism (H) 06/23/2016     Priority: Medium     Fatigue, unspecified type 06/21/2016     Priority: Medium     Vitamin D deficiency 06/21/2016     Priority: Medium     Gastroesophageal reflux disease without esophagitis 06/21/2016     Priority: Medium     Esophageal spasm 06/21/2016     Priority: Medium        Past Medical/Surgical History:  Past Medical History   Diagnosis Date     Anemia      Depressive disorder      Generalized anxiety disorder      Follows with psychiatrist     Past Surgical History   Procedure Laterality Date     Tonsillectomy  1975       Social History:  Social History     Social History     Marital status: Single     Spouse name: N/A     Number of children: 3     Years of education: N/A     Occupational History     [employer]     lactation resource spec     Social History Main Topics     Smoking status: Current Some Day Smoker     Smokeless tobacco: Never Used      Comment: 1 pack per month     Alcohol use No     Drug use: No     Sexual activity: Not Currently     Other Topics Concern     Not on file     Social History Narrative       Family History:  Family History   Problem Relation Age of Onset     DIABETES Maternal Grandmother      Asthma Brother      Family History Negative Father      Family History Negative Mother        Review of Systems:  A complete review of systems reviewed by me is negative with the exeption of what has been mentioned in the history of present illness.  CONSTITUTIONAL: NEGATIVE for weight gain/loss, fever, chills, sweats or night sweats, drug allergies.  CONSTITUTIONAL:  POSITIVE for  weight gain  EYES: NEGATIVE for changes in vision, blind spots, double vision.  ENT:  POSITIVE for  ear pain, sore throat and sinus pain  CARDIAC: NEGATIVE for fast heartbeats or fluttering in chest, chest pain or pressure, breathlessness when lying flat,  "swollen legs or swollen feet.  NEUROLOGIC:  POSITIVE for  headaches and weakness or numbness in the arms or legs  DERMATOLOGIC: NEGATIVE for rashes, new moles or change in mole(s)  PULMONARY: NEGATIVE SOB at rest, SOB with activity, dry cough, productive cough, coughing up blood, wheezing or whistling when breathing.    PULMONARY:  POSITIVE for  SOB with activity  GASTROINTESTINAL: NEGATIVE for nausea or vomitting, loose or watery stools, fat or grease in stools, constipation, abdominal pain, bowel movements black in color or blood noted.  GENITOURINARY: NEGATIVE for pain during urination, blood in urine, urinating more frequently than usual, irregular menstrual periods.  MUSCULOSKELETAL: NEGATIVE for muscle pain, bone or joint pain, swollen joints.  MUSCULOSKELETAL:  POSITIVE for  bone or joint pain  ENDOCRINE: NEGATIVE for increased thirst or urination, diabetes.  LYMPHATIC: NEGATIVE for swollen lymph nodes, lumps or bumps in the breasts or nipple discharge.    Physical Examination:  Vitals: /69 (BP Location: Right arm, Patient Position: Chair, Cuff Size: Adult Regular)  Pulse 68  Temp 98.4  F (36.9  C) (Oral)  Ht 1.676 m (5' 6\")  Wt 113.4 kg (250 lb)  SpO2 96%  BMI 40.35 kg/m2  BMI= Body mass index is 40.35 kg/(m^2).    Neck Cir (cm): 39 cm    [place] Total Score 3/7/2017   Total score - Cast[place] 18       GENERAL APPEARANCE: healthy, alert and no distress  EYES: Eyes grossly normal to inspection, PERRL and conjunctivae and sclerae normal  HENT: nose and mouth without ulcers or lesions, oropharynx crowded and soft palate dependent  NECK: no adenopathy, no asymmetry, masses, or scars and thyroid normal to palpation  RESP: lungs clear to auscultation - no rales, rhonchi or wheezes  CV: regular rates and rhythm, normal S1 S2, no S3 or S4 and no murmur, click or rub  MS: extremities normal- no gross deformities noted  NEURO: Normal strength and tone, mentation intact and speech normal  PSYCH: mentation appears " normal and affect normal/bright  Mallampati Class: IV.  Tonsillar Stage: 1  hidden by pillars.    Impression/Plan:    -Possible obstructive sleep apnea.      The patient presents with a history of excessive daytime sleepiness, nonrestorative sleep and snoring.  She has a narrow oropharynx on examination and BMI at 40.  Obstructive sleep apnea is possible and I recommend an overnight sleep study for evaluation.      The patient complains of daytime sleepiness from her 20s, but this was related to insufficient sleep at that time.  At this time, she has symptoms suggestive of sleep apnea and does not have cataplexy or other characteristic symptoms of a central disorder of hypersomnia.  I  will follow up after completion of her polysomnogram for further recommendations.       TREATMENT PLAN:   1.  Split night polysomnogram for evaluation of sleep apnea.   2.  Follow up after sleep study for further recommendations.     Literature provided regarding sleep apnea.      She will follow up with me in approximately two weeks after her sleep study has been competed to review the results and discuss plan of care.       Polysomnography reviewed.  Obstructive sleep apnea reviewed.  Complications of untreated sleep apnea were reviewed.    I spent a total of 45 minutes with this patient with more than 50% in counseling       MURIEL CRUZ MD             D: 2017 10:00   T: 2017 10:23   MT: BRIAN      Name:     DIANA ROSS   MRN:      [id number]        Account:      [id number]   :      1970           Visit Date:   2017      Document: [id number]       cc: Johnson Haile MD

## 2017-03-07 NOTE — MR AVS SNAPSHOT
"              After Visit Summary   3/7/2017    Dalia Hrat    MRN: [id number]           Patient Information     Date Of Birth          1970        Visit Information        Provider Department      3/7/2017 8:30 AM Zaki Cervantes MD [facility]        Today's Diagnoses     Snoring    -  1    Excessive daytime sleepiness          Care Instructions    MY TREATMENT INFORMATION FOR SLEEP APNEA-  Dalia Hart    DOCTOR : Zaki Cervantes MD  SLEEP CENTER :      MY CONTACT NUMBER:       If I haven't had a sleep study yet, what can I expect?  A personal story from [employer]  https://www.Private[employer]ets.com/watch?v=AxPLmlRpnCs    Am I having a home sleep study?  Here is a video in case you get home and want to make sure you have done it correctly  https://www.PrivateMarkets.com/watch?v=HTR4B3oAsp4&feature=youtu.be    Frequently asked questions:  1. What is Obstructive Sleep Apnea (TIERRA)? TIERRA is the most common type of sleep apnea. Apnea literally means, \"without breath.\" It is characterized by repetitive pauses in breathing, despite continued effort to breathe, and is usually associated with a reduction in blood oxygen saturation. Apneas can last 10 to over 60 seconds. It is caused by narrowing or collapse of the upper airway as muscles relax during sleep. Severity of sleep apnea is determined by frequency of breathing events and their effect on your sleep and oxygen levels determined during sleep testing.   2. What are the consequences of TIERRA? Symptoms include: daytime sleepiness- possibly increasing the risk of falling asleep while driving, unrefreshing/restless sleep, snoring, insomnia, waking frequently to urinate, waking with heartburn or reflux, reduced concentration and memory, and morning headaches. Other health consequences may include development of high blood pressure and other cardiovascular disease in persons who are susceptible. Untreated TIERRA  can contribute to heart disease, stroke and diabetes.   3. " What are the treatment options? In most situations, sleep apnea is a lifelong disease that must be managed with daily therapy. Medications are not effective for sleep apnea and surgery is generally not performed until other therapies have been tried. Therapy is usually tailored to the individual patient based on many factors including your wishes as well as severity of sleep apnea and severity of obesity. Continuous Positive Airway (CPAP) is the most reliable treatment. An oral device to hold your jaw forward is usually the next most reliable option. Other options include postioning devices (to keep you off your back), weight loss, and surgery including a tongue pacing device. There is more detail about some of these options below.            1. CPAP-  WHAT DOES IT DO AND HOW CAN I LEARN TO WEAR IT?                               BEFORE I START, CAN I WATCH A MOVIE TO GET A PLAN ON HOW TO USE CPAP?  https://www.Soluto.com/watch?j=t4D24bb031K      Continuous positive airway pressure, or CPAP, is the most effective treatment for obstructive sleep apnea. It works by blowing room air, through a mask, to hold your throat open. A decision to use CPAP is a major step forward in the pursuit of a healthier life. The successful use of CPAP will help you breathe easier, sleep better and live healthier. You can choose CPAP equipment from any durable medical equipment provider that meets your needs.  Using CPAP can be a positive experience if you keep these dickey points in mind:  1. Commitment  CPAP is not a quick fix for your problem. It involves a long-term commitment to improve your sleep and your health.    2. Communication  Stay in close communication with both your sleep doctor and your CPAP supplier. Ask lots of questions and seek help when you need it.    3. Consistency  Use CPAP all night, every night and for every nap. You will receive the maximum health benefits from CPAP when you use it every time that you sleep. This  "will also make it easier for your body to adjust to the treatment.    4. Correction  The first machine and mask that you try may not be the best ones for you. Work with your sleep doctor and your CPAP supplier to make corrections to your equipment selection. Ask about trying a different type of machine or mask if you have ongoing problems. Make sure that your mask is a good fit and learn to use your equipment properly.    5. Challenge  Tell a family member or close friend to ask you each morning if you used your CPAP the previous night. Have someone to challenge you to give it your best effort.    6. Connection   Your adjustment to CPAP will be easier if you are able to connect with others who use the same treatment. Ask your sleep doctor if there is a support group in your area for people who have sleep apnea, or look for one on the Internet.  7. Comfort   Increase your level of comfort by using a saline spray, decongestant or heated humidifier if CPAP irritates your nose, mouth or throat. Use your unit's \"ramp\" setting to slowly get used to the air pressure level. There may be soft pads you can buy that will fit over your mask straps. Look on www.CPAP.com for accessories that can help make CPAP use more comfortable.  8. Cleaning   Clean your mask, tubing and headgear on a regular basis. Put this time in your schedule so that you don't forget to do it. Check and replace the filters for your CPAP unit and humidifier.    9. Completion   Although you are never finished with CPAP therapy, you should reward yourself by celebrating the completion of your first month of treatment. Expect this first month to be your hardest period of adjustment. It will involve some trial and error as you find the machine, mask and pressure settings that are right for you.    10. Continuation  After your first month of treatment, continue to make a daily commitment to use your CPAP all night, every night and for every nap.    CPAP-Tips to " starting with success:  Begin using your CPAP for short periods of time during the day while you watch TV or read.    Use CPAP every night and for every nap. Using it less often reduces the health benefits and makes it harder for your body to get used to it.    Make small adjustments to your mask, tubing, straps and headgear until you get the right fit. Tightening the mask may actually worsen the leak.  If it leaves significant marks on your face or irritates the bridge of your nose, it may not be the best mask for you.  Speak with the person who supplied the mask and consider trying other masks. Insurances will allow you to try different masks during the first month of starting CPAP.  Insurance also covers a new mask, hose and filter about every 6 months.    Use a saline nasal spray to ease mild nasal congestion. Neti-Pot or saline nasal rinses may also help. Nasal gel sprays can help reduce nasal dryness.  Biotene mouthwash can be helpful to protect your teeth if you experience frequent dry mouth.  Dry mouth may be a sign of air escaping out of your mouth or out of the mask in the case of a full face mask.  Speak with your provider if you expect that is the case.     Take a nasal decongestant to relieve more severe nasal or sinus congestion.  Do not use Afrin (oxymetazoline) nasal spray more than 3 days in a row.  Speak with your sleep doctor if your nasal congestion is chronic.    Use a heated humidifier that fits your CPAP model to enhance your breathing comfort. Adjust the heat setting up if you get a dry nose or throat, down if you get condensation in the hose or mask.  Position the CPAP lower than you so that any condensation in the hose drains back into the machine rather than towards the mask.    Try a system that uses nasal pillows if traditional masks give you problems.    Clean your mask, tubing and headgear once a week. Make sure the equipment dries fully.    Regularly check and replace the filters for  your CPAP unit and humidifier.    Work closely with your sleep provider and your CPAP supplier to make sure that you have the machine, mask and air pressure setting that works best for you. It is better to stop using it and call your provider to solve problems than to lay awake all night frustrated with the device.    BESIDES CPAP, WHAT OTHER THERAPIES ARE THERE?      Positioning Device  Positioning devices are generally used when sleep apnea is mild and only occurs on your back.This example shows a pillow that straps around the waist. It may be appropriate for those whose sleep study shows milder sleep apnea that occurs primarily when lying flat on one's back. Preliminary studies have shown benefit but effectiveness at home may need to be verified by a home sleep test. These devices are generally not covered by medical insurance.                      Oral Appliance  What is oral appliance therapy?  An oral appliance is a small acrylic device that fits over the upper and lower teeth or tongue (similar to an orthodontic retainer or a mouth guard). This device slightly advances the lower jaw or tongue, which moves the base of the tongue forward, opens the airway, improves breathing and can effectively treat snoring and obstructive sleep apnea sleep apnea. The appliance is fabricated and customized by a qualified dentist with experience in treating snoring and sleep apnea. Oral appliances are usually well tolerated and have relatively high compliance by patients1, 2, 3.  When is an oral appliance indicated?  Oral appliance therapy is recommended as a first-line treatment for patients with primary snoring, mild sleep apnea, and for patients with moderate sleep apnea who prefer appliance therapy to use of CPAP4, 5. Severity of sleep apnea is determined by sleep testing and is based on the number of respiratory events per hour of sleep.   How successful is oral appliance therapy?  The success rate of oral appliance  therapy in patients with mild sleep apnea is 75-80% while in patients with moderate sleep apnea it is 50-70%. The chance of success in patients with severe sleep apnea is 40-50%. The research also shows that oral appliances have a beneficial effect on the cardiovascular health of TIERRA patients at the same magnitude as CPAP therapy7.  Oral appliances should be a second-line treatment in cases of severe sleep apnea, but if not completely successful then a combination therapy utilizing CPAP plus oral appliance therapy may be effective. Oral appliances tend to be effective in a broad range of patients although studies show that the patients who have the highest success are females, younger patients, those with milder disease, and less severe obesity. 3, 6.   The chances of success are lower in patients who have more severe TIERRA, are older, and those who are morbidly obese.     Example of an oral appliance   Finding a dentist that practices dental sleep medicine  Specific training is available through the American Academy of Dental Sleep Medicine for dentists interested in working in the field of sleep. To find a dentist who is educated in the field of sleep and the use of oral appliances, near you, visit the Web site of the American Academy of Dental Sleep Medicine; also see   http://www.accpstorage.org/newOrganization/patients/oralAppliances.pdf  To search for a dentist certified in these practices:  Http://aadsm.org/FindADentist.aspx?1  1. David et al. Objectively measured vs self-reported compliance during oral appliance therapy for sleep-disordered breathing. Chest 2013; 144(5): 5579-8194.  2. Elliot et al. Objective measurement of compliance during oral appliance therapy for sleep-disordered breathing. Thorax 2013; 68(1): 91-96.  3. Tiarra et al. Mandibular advancement devices in 620 men and women with TIERRA and snoring: tolerability and predictors of treatment success. Chest 2004; 125: 2656-2251.  4.  Yaquelin et al. Oral appliances for snoring and TIERRA: a review. Sleep 2006; 29: 244-262.  5. Mya et al. Oral appliance treatment for TIERRA: an update. J Clin Sleep Med 2014; 10(2): 215-227.  6. lauren Caraballo al. Predictors of OSAH treatment outcome. J Dent Res 2007; 86: 3057-2882.      Weight Loss:    Weight management is a personal decision.  If you are interested in exploring weight loss strategies, the following discussion covers the impact on weight loss on sleep apnea and the approaches that may be successful.    Weight loss decreases severity of sleep apnea in most people with obesity. For those with mild obesity who have developed snoring with weight gain, even 15-30 pound weight loss can improve and occasionally eliminate sleep apnea.  Structured and life-long dietary and health habits are necessary to lose weight and keep healthier weight levels.     Though there may be significant health benefits from weight loss, long-term weight loss is very difficult to achieve- studies show success with dietary management in less than 10% of people. In addition, substantial weight loss may require years of dietary control and may be difficult if patients have severe obesity. In these cases, surgical management may be considered.  Finally, older individuals who have tolerated obesity without health complications may be less likely to benefit from weight loss strategies.    Your BMI is Body mass index is 40.35 kg/(m^2).  Body mass index (BMI) is one way to tell whether you are at a healthy weight, overweight, or obese. It measures your weight in relation to your height.  A BMI of 18.5 to 24.9 is in the healthy range. A person with a BMI of 25 to 29.9 is considered overweight, and someone with a BMI of 30 or greater is considered obese. More than two-thirds of American adults are considered overweight or obese.  Being overweight or obese increases the risk for further weight gain. Excess weight may lead to heart  disease and diabetes.  Creating and following plans for healthy eating and physical activity may help you improve your health.  Weight control is part of healthy lifestyle and includes exercise, emotional health, and healthy eating habits. Careful eating habits lifelong are the mainstay of weight control. Though there are significant health benefits from weight loss, long-term weight loss with diet alone may be very difficult to achieve- studies show long-term success with dietary management in less than 10% of people. Attaining a healthy weight may be especially difficult to achieve in those with severe obesity. In some cases, medications, devices and surgical management might be considered.  What can you do?  If you are overweight or obese and are interested in methods for weight loss, you should discuss this with your provider.     Consider reducing daily calorie intake by 500 calories.     Keep a food journal.     Avoiding skipping meals, consider cutting portions instead.    Diet combined with exercise helps maintain muscle while optimizing fat loss. Strength training is particularly important for building and maintaining muscle mass. Exercise helps reduce stress, increase energy, and improves fitness. Increasing exercise without diet control, however, may not burn enough calories to loose weight.       Start walking three days a week 10-20 minutes at a time    Work towards walking thirty minutes five days a week     Eventually, increase the speed of your walking for 1-2 minutes at time    In addition, we recommend that you review healthy lifestyles and methods for weight loss available through the National Institutes of Health patient information sites:  http://win.niddk.nih.gov/publications/index.htm    And look into health and wellness programs that may be available through your health insurance provider, employer, local community center, or elio club.    Weight management plan: Patient was referred to  their PCP to discuss a diet and exercise plan.    Surgery:    Upper Airway Surgery for TIERRA  Surgery for TIERRA is a second-line treatment option in the management of sleep apnea.  Surgery should be considered for patients who are having a difficult time tolerating CPAP.    Surgery for TIERRA is directed at areas that are responsible for narrowing or complete obstruction of the airway during sleep.  There are a wide range of procedures available to enlarge and/or stabilize the airway to prevent blockage of breathing in the three major areas where it can occur: the palate, tongue, and nasal regions.  Successful surgical treatment depends on the accurate identification of the factors responsible for obstructive sleep apnea in each person.  A personalized approach is required because there is no single treatment that works well for everyone.  Because of anatomic variation, consultation with an examination by a sleep surgeon is a critical first step in determining what surgical options are best for each patient.  In some cases, examination during sedation may be recommended in order to guide the selection of procedures.  Patients will be counseled about risks and benefits as well as the typical recovery course after surgery. Surgery is typically not a cure for a person s TIERRA.  However, surgery will often significantly improve one s TIERRA severity (termed  success rate ).  Even in the absence of a cure, surgery will decrease the cardiovascular risk associated with OSA7; improve overall quality of life8 (sleepiness, functionality, sleep quality, etc).          Palate Procedures:  Patients with TIERRA often have narrowing of their airway in the region of their tonsils and uvula.  The goals of palate procedures are to widen the airway in this region as well as to help the tissues resist collapse.  Modern palate procedure techniques focus on tissue conservation and soft tissue rearrangement, rather than tissue removal.  Often the uvula  is preserved in this procedure. Residual sleep apnea is common in patient after pharyngoplasty with an average reduction in sleep apnea events of 33%2.      Tongue Procedures:  While patients are awake, the muscles that surround the throat are active and keep this region open for breathing. These muscles relax during sleep, allowing the tongue and other structures to collapse and block breathing.  There are several different tongue procedures available.  Selection of a tongue base procedure depends on characteristics seen on physical exam.  Generally, procedures are aimed at removing bulky tissues in this area or preventing the back of the tongue from falling back during sleep.  Success rates for tongue surgery range from 50-62%3.    Hypoglossal Nerve Stimulation:  Hypoglossal nerve stimulation has recently received approval from the [place] Food and Drug Administration for the treatment of obstructive sleep apnea.  This is based on research showing that the system was safe and effective in treating sleep apnea6.  Results showed that the median AHI score decreased 68%, from 29.3 to 9.0. This therapy uses an implant system that senses breathing patterns and delivers mild stimulation to airway muscles, which keeps the airway open during sleep.  The system consists of three fully implanted components: a small generator (similar in size to a pacemaker), a breathing sensor, and a stimulation lead.  Using a small handheld remote, a patient turns the therapy on before bed and off upon awakening.    Candidates for this device must be greater than 22 years of age, have moderate to severe TIERRA (AHI between 20-65), BMI less than 32, have tried CPAP/oral appliance without success, and have appropriate upper airway anatomy (determined by a sleep endoscopy performed by Dr. Mccray).    Hypoglossal Nerve Stimulation Pathway:    The sleep surgeon s office will work with the patient through the insurance prior-authorization  process (including communications and appeals).    Nasal Procedures:  Nasal obstruction can interfere with nasal breathing during the day and night.  Studies have shown that relief of nasal obstruction can improve the ability of some patients to tolerate positive airway pressure therapy for obstructive sleep apnea1.  Treatment options include medications such as nasal saline, topical corticosteroid and antihistamine sprays, and oral medications such as antihistamines or decongestants. Non-surgical treatments can include external nasal dilators for selected patients. If these are not successful by themselves, surgery can improve the nasal airway either alone or in combination with these other options.      Combination Procedures:  Combination of surgical procedures and other treatments may be recommended, particularly if patients have more than one area of narrowing or persistent positional disease.  The success rate of combination surgery ranges from 66-80%2,3.      1. Pascale BROWN. The Role of the Nose in Snoring and Obstructive Sleep Apnoea: An Update.  Eur Arch Otorhinolaryngol. 2011; 268: 1365-73.  2.  Constanza SM; Lauren JA; Roc JR; Pallanch JF; Brissa MB; Andrew SG; Dayan CAO. Surgical modifications of the upper airway for obstructive sleep apnea in adults: a systematic review and meta-analysis. SLEEP 2010;33(10):6693-4157. Charlee BRONSON. Hypopharyngeal surgery in obstructive sleep apnea: an evidence-based medicine review.  Arch Otolaryngol Head Neck Surg. 2006 Feb;132(2):206-13.  3. Andrzej YH1, Mikal Y, Maurilio MARTHA. The efficacy of anatomically based multilevel surgery for obstructive sleep apnea. Otolaryngol Head Neck Surg. 2003 Oct;129(4):327-35.  4. Kezirian E, Goldberg A. Hypopharyngeal Surgery in Obstructive Sleep Apnea: An Evidence-Based Medicine Review. Arch Otolaryngol Head Neck Surg. 2006 Feb;132(2):206-13.  5. Graciela WHEAT et al. Upper-Airway Stimulation for Obstructive Sleep Apnea.  N Engl J Med. 2014 Damir  9;370(2):139-49.  6. Amanda Y et al. Increased Incidence of Cardiovascular Disease in Middle-aged Men with Obstructive Sleep Apnea. Am J Respir Crit Care Med; 2002 166: 159-165  7. Aniya TYSON et al. Studying Life Effects and Effectiveness of Palatopharyngoplasty (SLEEP) study: Subjective Outcomes of Isolated Uvulopalatopharyngoplasty. Otolaryngol Head Neck Surg. 2011; 144: 623-631.                      Follow-ups after your visit        Future tests that were ordered for you today     Open Future Orders        Priority Expected Expires Ordered    Comprehensive Sleep Study Routine  9/3/2017 3/7/2017            Who to contact     If you have questions or need follow up information about today's clinic visit or your schedule please contact [facility] directly at [phone].  Normal or non-critical lab and imaging results will be communicated to you by MyChart, letter or phone within 4 business days after the clinic has received the results. If you do not hear from us within 7 days, please contact the clinic through [employer] or phone. If you have a critical or abnormal lab result, we will notify you by phone as soon as possible.  Submit refill requests through [email or web address] or call your pharmacy and they will forward the refill request to us. Please allow 3 business days for your refill to be completed.          Additional Information About Your Visit        MyChart Information     [email or web address] gives you secure access to your electronic health record. If you see a primary care provider, you can also send messages to your care team and make appointments. If you have questions, please call your primary care clinic.  If you do not have a primary care provider, please call [phone] and they will assist you.        Care EveryWhere ID     This is your Care EveryWhere ID. This could be used by other organizations to access your [facility] medical records  [id number]        Your Vitals Were     Pulse Temperature  "Height Pulse Oximetry BMI (Body Mass Index)       68 98.4  F (36.9  C) (Oral) 1.676 m (5' 6\") 96% 40.35 kg/m2        Blood Pressure from Last 3 Encounters:   03/07/17 109/69   01/24/17 108/70   08/29/16 130/78    Weight from Last 3 Encounters:   03/07/17 113.4 kg (250 lb)   01/24/17 114.3 kg (252 lb)   08/29/16 110.1 kg (242 lb 12.8 oz)                 Today's Medication Changes          These changes are accurate as of: 3/7/17  1:04 PM.  If you have any questions, ask your nurse or doctor.               Stop taking these medicines if you haven't already. Please contact your care team if you have questions.     albuterol 108 (90 BASE) MCG/ACT Inhaler   Commonly known as:  albuterol   Stopped by:  Zaki Cervantes MD           oseltamivir 75 MG capsule   Commonly known as:  TAMIFLU   Stopped by:  Zaki Cervantes MD                    Primary Care Provider Office Phone # Fax #    Johnson GUI Haile [phone]       [facility] [address] [facility] [address]        Thank you!     Thank you for choosing [facility]  for your care. Our goal is always to provide you with excellent care. Hearing back from our patients is one way we can continue to improve our services. Please take a few minutes to complete the written survey that you may receive in the mail after your visit with us. Thank you!             Your Updated Medication List - Protect others around you: Learn how to safely use, store and throw away your medicines at www.disposemymeds.org.          This list is accurate as of: 3/7/17  1:04 PM.  Always use your most recent med list.                   Brand Name Dispense Instructions for use    aspirin 81 MG chewable tablet      Take 81 mg by mouth daily       calcium-vitamin D-vitamin K 500-500-40 MG-UNT-MCG Chew    VIACTIV    100 tablet    Take 1 tablet by mouth 2 times daily (with meals) INDICATION: FOR BONE AND BREAST HEALTH       cholecalciferol 77583 UNITS " capsule    VITAMIN D3    40 capsule    SIG: TAKE 1 CAP TWICE A WEEK FOR 2 WEEKS, THEN ONE CAP EVERY OTHER WEEK, INDICATION: TO TREAT VITAMIN D DEFICIENCY (1ST AND 15TH OF EACH MONTH)       Cyanocobalamin 5000 MCG/ML Liqd    B-12 SUPER STRENGTH    2 Bottle    Place 1 mL under the tongue every morning FOR VITAMIN B12 SUPPLEMENTATION, PLEASE PLACE UNDER THE TONGUE       nicotine polacrilex 2 MG gum    NICORETTE    100 each    1 piece of gum every 1-2 hours as needed for smoking cessation       omeprazole 20 MG CR capsule    priLOSEC    90 capsule    Take 1 capsule (20 mg) by mouth every morning (before breakfast) INDICATION: TO CONTROL REFLUX SYMPTOMS       vitamin C-electrolytes 1000mg vitamin C super orange drink mix    EMERGEN-C    90 packet    Mix 1 packet in 4-6oz water and take once daily INDICATION: VITAMIN C SUPPLEMENTION

## 2017-03-24 ENCOUNTER — TRANSFERRED RECORDS (OUTPATIENT)
Dept: HEALTH INFORMATION MANAGEMENT | Facility: CLINIC | Age: 47
End: 2017-03-24

## 2017-03-24 LAB — PAP-ABSTRACT: NORMAL

## 2017-06-18 ENCOUNTER — MYC MEDICAL ADVICE (OUTPATIENT)
Dept: INTERNAL MEDICINE | Facility: CLINIC | Age: 47
End: 2017-06-18

## 2017-06-20 ENCOUNTER — THERAPY VISIT (OUTPATIENT)
Dept: SLEEP MEDICINE | Facility: CLINIC | Age: 47
End: 2017-06-20
Payer: COMMERCIAL

## 2017-06-20 DIAGNOSIS — G47.19 EXCESSIVE DAYTIME SLEEPINESS: ICD-10-CM

## 2017-06-20 DIAGNOSIS — R06.83 SNORING: ICD-10-CM

## 2017-06-20 PROCEDURE — 95810 POLYSOM 6/> YRS 4/> PARAM: CPT | Performed by: INTERNAL MEDICINE

## 2017-06-20 NOTE — MR AVS SNAPSHOT
After Visit Summary   6/20/2017    Dalia Hart    MRN: [id number]           Patient Information     Date Of Birth          1970        Visit Information        Provider Department      6/20/2017 8:30 PM BED 6 SH SLEEP [facility]        Today's Diagnoses     Excessive daytime sleepiness        Snoring           Follow-ups after your visit        Your next 10 appointments already scheduled     Jul 11, 2017 10:00 AM CDT   Return Sleep Patient with Zaki Cervantes MD   [facility] ([facility])    [address] [id number] [phone]            Aug 28, 2017  3:00 PM CDT   MyChart Physical Adult with Johnson Haile MD   [facility] ([facility]    [address] [address] [phone]              Who to contact     If you have questions or need follow up information about today's clinic visit or your schedule please contact [facility] directly at [phone].  Normal or non-critical lab and imaging results will be communicated to you by Bromiumhart, letter or phone within 4 business days after the clinic has received the results. If you do not hear from us within 7 days, please contact the clinic through ShaveLogict or phone. If you have a critical or abnormal lab result, we will notify you by phone as soon as possible.  Submit refill requests through Choister or call your pharmacy and they will forward the refill request to us. Please allow 3 business days for your refill to be completed.          Additional Information About Your Visit        Bromiumhart Information     Choister gives you secure access to your electronic health record. If you see a primary care provider, you can also send messages to your care team and make appointments. If you have questions, please call your primary care clinic.  If you do not have a primary care  provider, please call [phone] and they will assist you.        Care EveryWhere ID     This is your Care EveryWhere ID. This could be used by other organizations to access your [facility] medical records  [id number]         Blood Pressure from Last 3 Encounters:   03/07/17 109/69   01/24/17 108/70   08/29/16 130/78    Weight from Last 3 Encounters:   03/07/17 113.4 kg (250 lb)   01/24/17 114.3 kg (252 lb)   08/29/16 110.1 kg (242 lb 12.8 oz)              We Performed the Following     Comprehensive Sleep Study        Primary Care Provider Office Phone # Fax #    Johnson Haile [phone]       [facility] [address] [facility] [address]        Thank you!     Thank you for choosing [facility]  for your care. Our goal is always to provide you with excellent care. Hearing back from our patients is one way we can continue to improve our services. Please take a few minutes to complete the written survey that you may receive in the mail after your visit with us. Thank you!             Your Updated Medication List - Protect others around you: Learn how to safely use, store and throw away your medicines at www.disposemymeds.org.          This list is accurate as of: 6/20/17 11:59 PM.  Always use your most recent med list.                   Brand Name Dispense Instructions for use    aspirin 81 MG chewable tablet      Take 81 mg by mouth daily       calcium-vitamin D-vitamin K 500-500-40 MG-UNT-MCG Chew    VIACTIV    100 tablet    Take 1 tablet by mouth 2 times daily (with meals) INDICATION: FOR BONE AND BREAST HEALTH       cholecalciferol 32387 UNITS capsule    VITAMIN D3    40 capsule    SIG: TAKE 1 CAP TWICE A WEEK FOR 2 WEEKS, THEN ONE CAP EVERY OTHER WEEK, INDICATION: TO TREAT VITAMIN D DEFICIENCY (1ST AND 15TH OF EACH MONTH)       Cyanocobalamin 5000 MCG/ML Liqd    B-12 SUPER STRENGTH    2 Bottle    Place 1 mL under the tongue every morning FOR VITAMIN B12  SUPPLEMENTATION, PLEASE PLACE UNDER THE TONGUE       nicotine polacrilex 2 MG gum    NICORETTE    100 each    1 piece of gum every 1-2 hours as needed for smoking cessation       omeprazole 20 MG CR capsule    priLOSEC    90 capsule    Take 1 capsule (20 mg) by mouth every morning (before breakfast) INDICATION: TO CONTROL REFLUX SYMPTOMS       vitamin C-electrolytes 1000mg vitamin C super orange drink mix    EMERGEN-C    90 packet    Mix 1 packet in 4-6oz water and take once daily INDICATION: VITAMIN C SUPPLEMENTION

## 2017-06-26 NOTE — PROCEDURES
"SLEEP STUDY INTERPRETATION  POLYSOMNOGRAPHY REPORT      Patient: Dalia Hart  YOB: 1970  Study Date: 6/20/2017  MRN: [id number]  Referring Provider: Md Haile Adepero  Ordering Provider: MD Cervantes Rakesh    Indications for Polysomnography: The patient is a 46 y old Female who is 5' 6\" and weighs 250.0 lbs.  Her BMI is 40.7, Gurley sleepiness scale 18.0 and neck size is 39.0.  Relevant medical history includes excessive daytime sleepiness and no other significant medical co-morbidities. A diagnostic polysomnogram was performed to evaluate for sleep apnea.    Polysomnogram Data:  A full night polysomnogram recorded the standard physiologic parameters including EEG, EOG, EMG, ECG, nasal and oral airflow.  Respiratory parameters of chest and abdominal movements were recorded with respiratory inductance plethysmography.  Oxygen saturation was recorded by pulse oximetry.      Sleep Architecture: Slight reduced stage N3; otherwise unremarkable sleep architecture.   The total recording time of the polysomnogram was 485.2 minutes.  The total sleep time was 421.0 minutes.  Sleep latency was decreased at 2.5 minutes without the use of a sleep aid.  REM latency was 141.0 minutes.  Arousal index was normal at 13.8 arousals per hour.  Sleep efficiency was normal at 86.8%.  Wake after sleep onset was 60.5 minutes.  The patient spent 2.7% of total sleep time in Stage N1, 59.5% in Stage N2, 15.1% in Stages N3, and 22.7% in REM.  Time in REM supine was 48.0 minutes.    Respiration: Negative for sleep apnea.     Events - The polysomnogram revealed a presence of - obstructive, 1 central, and - mixed apneas resulting in an apnea index of 0.1 events per hour.  There were 11 hypopneas resulting in a hypopnea index of 1.6 events per hour.  The combined apnea/hypopnea index was 1.7 events per hour.  The REM AHI was 5.0 events per hour.  The supine AHI was 2.5 events per hour.  The RERA index was 2.0 events per hour.   " The RDI was 3.7 events per hour.    Snoring - was reported as mild to moderate.    Respiratory rate and pattern - was notable for normal respiratory rate and pattern.    Sustained Sleep Associated Hypoventilation - Transcutaneous carbon dioxide monitoring was not used, however significant hypoventilation was not suggested by oximetry.    Sleep Associated Hypoxemia - (Greater than 5 minutes O2 sat below 89%) was not present.  Baseline oxygen saturation was 95.0%. Lowest oxygen saturation was 83.5%.  Time spent less than or equal to 88% was 0.3 minutes.  Time spent less than or equal to 89% was 0.6 minutes.  3.7 2.0 1.7     Movement Activity: There was no significant movement abnormality.     Periodic Limb Activity - There were 20 PLMs during the entire study. The PLM index was 2.9 movements per hour.  The PLM Arousal Index was 0.3 per hour.    REM EMG Activity - Excessive transient / sustained muscle activity was not present.    Nocturnal Behavior - Abnormal sleep related behaviors were not noted during / arising out of NREM / REM sleep.      Bruxism - None apparent.    Cardiac Summary: Normal sinus rhythm.   The average pulse rate was 60.7 bpm.  The minimum pulse rate was 50.0 bpm while the maximum pulse rate was 100.1 bpm. The rhythm is normal sinus. Arrhythmias were not noted.    Assessment:     This sleep study was negative for obstructive sleep apnea or hypoxemia.     Seven hours of sleep was obtained without any significant sleep fragmenting conditions.     Recommendations:    Consider further assessment of sleep wake schedules and objective sleepiness with actigraphy and multiple sleep latency test.    Advise regarding the risks of drowsy driving.    Suggest optimizing sleep schedule and avoiding sleep deprivation.    Weight management.    Cardiac Comments: Normal Sinus Rhythm  Diagnostic Codes:     Excessive daytime sleepiness    Unspecified Sleep Disturbance G47.9       _____________________________________    Zaki Cervantes MD 06/25/2017

## 2017-07-11 ENCOUNTER — OFFICE VISIT (OUTPATIENT)
Dept: SLEEP MEDICINE | Facility: CLINIC | Age: 47
End: 2017-07-11
Payer: COMMERCIAL

## 2017-07-11 VITALS
HEART RATE: 71 BPM | WEIGHT: 247 LBS | RESPIRATION RATE: 18 BRPM | OXYGEN SATURATION: 97 % | SYSTOLIC BLOOD PRESSURE: 114 MMHG | BODY MASS INDEX: 39.7 KG/M2 | HEIGHT: 66 IN | DIASTOLIC BLOOD PRESSURE: 64 MMHG

## 2017-07-11 DIAGNOSIS — G47.19 EXCESSIVE DAYTIME SLEEPINESS: Primary | ICD-10-CM

## 2017-07-11 PROCEDURE — 99213 OFFICE O/P EST LOW 20 MIN: CPT | Performed by: INTERNAL MEDICINE

## 2017-07-11 NOTE — MR AVS SNAPSHOT
After Visit Summary   7/11/2017    Dalia Hart    MRN: [id number]           Patient Information     Date Of Birth          1970        Visit Information        Provider Department      7/11/2017 10:00 AM Zaki Cervantes MD [facility] Instructions    1. Your sleep study is negative for sleep apnea or other causes for disrupted sleep  2. I want you to increase your night time sleep hours. Aim for 8 hours of sleep on average  3. Allow yourself to naturally wake up in the morning.   4. Return to clinic in 2 months. If you are sleepy despite regular sleep of 7-8 hours, we can perform further testing to objectively asses your sleepiness and use medications to increase your alertness     Your BMI is Body mass index is 39.87 kg/(m^2).  Weight management is a personal decision.  If you are interested in exploring weight loss strategies, the following discussion covers the approaches that may be successful. Body mass index (BMI) is one way to tell whether you are at a healthy weight, overweight, or obese. It measures your weight in relation to your height.  A BMI of 18.5 to 24.9 is in the healthy range. A person with a BMI of 25 to 29.9 is considered overweight, and someone with a BMI of 30 or greater is considered obese. More than two-thirds of American adults are considered overweight or obese.  Being overweight or obese increases the risk for further weight gain. Excess weight may lead to heart disease and diabetes.  Creating and following plans for healthy eating and physical activity may help you improve your health.  Weight control is part of healthy lifestyle and includes exercise, emotional health, and healthy eating habits. Careful eating habits lifelong are the mainstay of weight control. Though there are significant health benefits from weight loss, long-term weight loss with diet alone may be very difficult to achieve- studies show long-term success with dietary  management in less than 10% of people. Attaining a healthy weight may be especially difficult to achieve in those with severe obesity. In some cases, medications, devices and surgical management might be considered.  What can you do?  If you are overweight or obese and are interested in methods for weight loss, you should discuss this with your provider.     Consider reducing daily calorie intake by 500 calories.     Keep a food journal.     Avoiding skipping meals, consider cutting portions instead.    Diet combined with exercise helps maintain muscle while optimizing fat loss. Strength training is particularly important for building and maintaining muscle mass. Exercise helps reduce stress, increase energy, and improves fitness. Increasing exercise without diet control, however, may not burn enough calories to loose weight.       Start walking three days a week 10-20 minutes at a time    Work towards walking thirty minutes five days a week     Eventually, increase the speed of your walking for 1-2 minutes at time    In addition, we recommend that you review healthy lifestyles and methods for weight loss available through the National Institutes of Health patient information sites:  http://win.niddk.nih.gov/publications/index.htm    And look into health and wellness programs that may be available through your health insurance provider, employer, local community center, or elio club.    Weight management plan: Patient was referred to their PCP to discuss a diet and exercise plan.              Follow-ups after your visit        Your next 10 appointments already scheduled     Aug 28, 2017  3:00 PM CDT   [place] Physical Adult with Johnson Haile MD   [facility]    [address] [address] [phone]              Who to contact     If you have questions or need follow up information about today's clinic visit or your schedule  "please contact [facility] directly at [phone].  Normal or non-critical lab and imaging results will be communicated to you by MyChart, letter or phone within 4 business days after the clinic has received the results. If you do not hear from us within 7 days, please contact the clinic through MyChart or phone. If you have a critical or abnormal lab result, we will notify you by phone as soon as possible.  Submit refill requests through Chegg or call your pharmacy and they will forward the refill request to us. Please allow 3 business days for your refill to be completed.          Additional Information About Your Visit        Soteria SystemsharSweet Shop Information     Chegg gives you secure access to your electronic health record. If you see a primary care provider, you can also send messages to your care team and make appointments. If you have questions, please call your primary care clinic.  If you do not have a primary care provider, please call [phone] and they will assist you.        Care EveryWhere ID     This is your Care EveryWhere ID. This could be used by other organizations to access your [facility] medical records  [id number]        Your Vitals Were     Pulse Respirations Height Pulse Oximetry BMI (Body Mass Index)       71 18 1.676 m (5' 6\") 97% 39.87 kg/m2        Blood Pressure from Last 3 Encounters:   07/11/17 114/64   03/07/17 109/69   01/24/17 108/70    Weight from Last 3 Encounters:   07/11/17 112 kg (247 lb)   03/07/17 113.4 kg (250 lb)   01/24/17 114.3 kg (252 lb)              Today, you had the following     No orders found for display       Primary Care Provider Office Phone # Fax #    Johnson Haile [phone]       [facility] [address] [facility] [address]        Equal Access to Services     BARBARA LEMON : Nj Rider, waaxda luqadaha, qaybta kaalmada omkar, merary arreguin. So wa " [phone].    ATENCIÓN: Si chacorta goncalves, tiene a carrasco disposición servicios gratuitos de asistencia lingüística. Kendy kaminski [phone].    We comply with applicable federal civil rights laws and [place] laws. We do not discriminate on the basis of race, color, national origin, age, disability sex, sexual orientation or gender identity.            Thank you!     Thank you for choosing [facility]  for your care. Our goal is always to provide you with excellent care. Hearing back from our patients is one way we can continue to improve our services. Please take a few minutes to complete the written survey that you may receive in the mail after your visit with us. Thank you!             Your Updated Medication List - Protect others around you: Learn how to safely use, store and throw away your medicines at www.disposemymeds.org.          This list is accurate as of: 7/11/17 10:38 AM.  Always use your most recent med list.                   Brand Name Dispense Instructions for use Diagnosis    aspirin 81 MG chewable tablet      Take 81 mg by mouth daily        calcium-vitamin D-vitamin K 500-500-40 MG-UNT-MCG Chew    VIACTIV    100 tablet    Take 1 tablet by mouth 2 times daily (with meals) INDICATION: FOR BONE AND BREAST HEALTH    Vitamin D deficiency, Fatigue, unspecified type       cholecalciferol 12009 UNITS capsule    VITAMIN D3    40 capsule    SIG: TAKE 1 CAP TWICE A WEEK FOR 2 WEEKS, THEN ONE CAP EVERY OTHER WEEK, INDICATION: TO TREAT VITAMIN D DEFICIENCY (1ST AND 15TH OF EACH MONTH)    Fatigue, unspecified type, Vitamin D deficiency       Cyanocobalamin 5000 MCG/ML Liqd    B-12 SUPER STRENGTH    2 Bottle    Place 1 mL under the tongue every morning FOR VITAMIN B12 SUPPLEMENTATION, PLEASE PLACE UNDER THE TONGUE    Fatigue, unspecified type, Vitamin B12 deficiency (non anemic)       nicotine polacrilex 2 MG gum    NICORETTE    100 each    1 piece of gum every 1-2 hours as needed for smoking  cessation    Encounter for smoking cessation counseling       omeprazole 20 MG CR capsule    priLOSEC    90 capsule    Take 1 capsule (20 mg) by mouth every morning (before breakfast) INDICATION: TO CONTROL REFLUX SYMPTOMS    Gastroesophageal reflux disease without esophagitis       vitamin C-electrolytes 1000mg vitamin C super orange drink mix    EMERGEN-C    90 packet    Mix 1 packet in 4-6oz water and take once daily INDICATION: VITAMIN C SUPPLEMENTION    Low serum vitamin C

## 2017-07-11 NOTE — PROGRESS NOTES
Sleep Study Follow-Up Visit:    Date on this visit: 7/11/2017    Dalia Hart comes in today for follow-up of her sleep study done on 06/20/2017 at the [facility] for possible sleep apnea.    Sleep latency 2.5 minutes without Ambien.  REM achieved.   REM latency 141 minutes.  Sleep efficiency 86.8%. Total sleep time 421 minutes.    Sleep architecture:  Stage 1, 2.7% (5%), stage 2, 59.5% (45-55%), stage 3, 15% (15-20%), stage REM, 22.7% (20-25%).  AHI was 1.7, without significant desaturations. RDI 3.7.  REM AHI 5, consistent with mild REM TIERRA.  Supine AHI 2.5, consistent with no SUPINE TIERRA.  Periodic Limb Movement Index 2.9/hour.       These findings were reviewed with patient.     Past medical/surgical history, family history, social history, medications and allergies were reviewed.      Problem List:  Patient Active Problem List    Diagnosis Date Noted     Daytime hypersomnolence 01/24/2017     Priority: Medium     Low serum vitamin C 08/29/2016     Priority: Medium     WITH BORDERLINE B6 LEVEL       Vitamin B12 deficiency (non anemic) 06/23/2016     Priority: Medium     Hyperlipidemia with target LDL less than 160 06/23/2016     Priority: Medium     Hyperparathyroidism (H) 06/23/2016     Priority: Medium     Fatigue, unspecified type 06/21/2016     Priority: Medium     Vitamin D deficiency 06/21/2016     Priority: Medium     Gastroesophageal reflux disease without esophagitis 06/21/2016     Priority: Medium     Esophageal spasm 06/21/2016     Priority: Medium        Impression/Plan:    1. Sleep study is negative for sleep apnea or other sleep fragmenting conditions   2. Excessive daytime sleepiness     Patient was counseled regarding negative sleep study.     We discussed her sleepiness. Patient works from home. She goes to bed by 11 pm and wakes up at 5 am as her dog needs to be let out. She takes a 1 hour nap in the day.     I advised her to increase her night time sleep and  consistently obtain 7-8 hours of sleep. If she remains sleepy despite adequate sleep intake , we can consider actigrpahy, PSG and MSLT to objectively assess her sleepiness and consider wake promoting agents.     She will follow up with me in about 2 month(s).     Fifteen minutes spent with patient, all of which were spent face-to-face counseling, consulting, coordinating plan of care.      Zaki Cervantes MD    CC: Johnson Haile

## 2017-07-11 NOTE — PATIENT INSTRUCTIONS
1. Your sleep study is negative for sleep apnea or other causes for disrupted sleep  2. I want you to increase your night time sleep hours. Aim for 8 hours of sleep on average  3. Allow yourself to naturally wake up in the morning.   4. Return to clinic in 2 months. If you are sleepy despite regular sleep of 7-8 hours, we can perform further testing to objectively asses your sleepiness and use medications to increase your alertness     Your BMI is Body mass index is 39.87 kg/(m^2).  Weight management is a personal decision.  If you are interested in exploring weight loss strategies, the following discussion covers the approaches that may be successful. Body mass index (BMI) is one way to tell whether you are at a healthy weight, overweight, or obese. It measures your weight in relation to your height.  A BMI of 18.5 to 24.9 is in the healthy range. A person with a BMI of 25 to 29.9 is considered overweight, and someone with a BMI of 30 or greater is considered obese. More than two-thirds of American adults are considered overweight or obese.  Being overweight or obese increases the risk for further weight gain. Excess weight may lead to heart disease and diabetes.  Creating and following plans for healthy eating and physical activity may help you improve your health.  Weight control is part of healthy lifestyle and includes exercise, emotional health, and healthy eating habits. Careful eating habits lifelong are the mainstay of weight control. Though there are significant health benefits from weight loss, long-term weight loss with diet alone may be very difficult to achieve- studies show long-term success with dietary management in less than 10% of people. Attaining a healthy weight may be especially difficult to achieve in those with severe obesity. In some cases, medications, devices and surgical management might be considered.  What can you do?  If you are overweight or obese and are interested in methods for  weight loss, you should discuss this with your provider.     Consider reducing daily calorie intake by 500 calories.     Keep a food journal.     Avoiding skipping meals, consider cutting portions instead.    Diet combined with exercise helps maintain muscle while optimizing fat loss. Strength training is particularly important for building and maintaining muscle mass. Exercise helps reduce stress, increase energy, and improves fitness. Increasing exercise without diet control, however, may not burn enough calories to loose weight.       Start walking three days a week 10-20 minutes at a time    Work towards walking thirty minutes five days a week     Eventually, increase the speed of your walking for 1-2 minutes at time    In addition, we recommend that you review healthy lifestyles and methods for weight loss available through the National Institutes of Health patient information sites:  http://win.niddk.nih.gov/publications/index.htm    And look into health and wellness programs that may be available through your health insurance provider, employer, local community center, or elio club.    Weight management plan: Patient was referred to their PCP to discuss a diet and exercise plan.

## 2017-07-11 NOTE — NURSING NOTE
"Chief Complaint   Patient presents with     Sleep Problem     psg f/u       Initial /64  Pulse 71  Resp 18  Ht 1.676 m (5' 6\")  Wt 112 kg (247 lb)  SpO2 97%  BMI 39.87 kg/m2 Estimated body mass index is 39.87 kg/(m^2) as calculated from the following:    Height as of this encounter: 1.676 m (5' 6\").    Weight as of this encounter: 112 kg (247 lb).  Medication Reconciliation: complete     ESS 18  Neck 39cm      Nhi Admire  [facility] - Specialist    "

## 2017-07-24 DIAGNOSIS — Z01.84 IMMUNITY STATUS TESTING: ICD-10-CM

## 2017-07-24 DIAGNOSIS — E55.9 VITAMIN D DEFICIENCY: ICD-10-CM

## 2017-07-24 DIAGNOSIS — E53.8 VITAMIN B12 DEFICIENCY (NON ANEMIC): ICD-10-CM

## 2017-07-24 PROCEDURE — 86706 HEP B SURFACE ANTIBODY: CPT | Performed by: INTERNAL MEDICINE

## 2017-07-24 PROCEDURE — 82306 VITAMIN D 25 HYDROXY: CPT | Performed by: INTERNAL MEDICINE

## 2017-07-24 PROCEDURE — 36415 COLL VENOUS BLD VENIPUNCTURE: CPT | Performed by: INTERNAL MEDICINE

## 2017-07-24 PROCEDURE — 86708 HEPATITIS A ANTIBODY: CPT | Performed by: INTERNAL MEDICINE

## 2017-07-24 PROCEDURE — 86704 HEP B CORE ANTIBODY TOTAL: CPT | Performed by: INTERNAL MEDICINE

## 2017-07-24 PROCEDURE — 86803 HEPATITIS C AB TEST: CPT | Performed by: INTERNAL MEDICINE

## 2017-07-24 PROCEDURE — 82607 VITAMIN B-12: CPT | Performed by: INTERNAL MEDICINE

## 2017-07-25 LAB
DEPRECATED CALCIDIOL+CALCIFEROL SERPL-MC: 58 UG/L (ref 20–75)
HAV IGG SER QL IA: ABNORMAL
HBV CORE AB SERPL QL IA: NONREACTIVE
HBV SURFACE AB SERPL IA-ACNC: ABNORMAL M[IU]/ML
HCV AB SERPL QL IA: NORMAL
VIT B12 SERPL-MCNC: 1087 PG/ML (ref 193–986)

## 2017-08-28 ENCOUNTER — RADIANT APPOINTMENT (OUTPATIENT)
Dept: GENERAL RADIOLOGY | Facility: CLINIC | Age: 47
End: 2017-08-28
Attending: INTERNAL MEDICINE
Payer: COMMERCIAL

## 2017-08-28 ENCOUNTER — OFFICE VISIT (OUTPATIENT)
Dept: INTERNAL MEDICINE | Facility: CLINIC | Age: 47
End: 2017-08-28
Payer: COMMERCIAL

## 2017-08-28 VITALS
HEART RATE: 84 BPM | SYSTOLIC BLOOD PRESSURE: 107 MMHG | RESPIRATION RATE: 18 BRPM | DIASTOLIC BLOOD PRESSURE: 60 MMHG | WEIGHT: 245 LBS | OXYGEN SATURATION: 99 % | TEMPERATURE: 97.7 F | BODY MASS INDEX: 39.54 KG/M2

## 2017-08-28 DIAGNOSIS — R09.81 CONGESTION OF PARANASAL SINUS: ICD-10-CM

## 2017-08-28 DIAGNOSIS — R53.83 FATIGUE, UNSPECIFIED TYPE: ICD-10-CM

## 2017-08-28 DIAGNOSIS — R53.82 CHRONIC FATIGUE: Primary | ICD-10-CM

## 2017-08-28 DIAGNOSIS — E55.9 VITAMIN D DEFICIENCY: ICD-10-CM

## 2017-08-28 DIAGNOSIS — E53.8 VITAMIN B12 DEFICIENCY (NON ANEMIC): ICD-10-CM

## 2017-08-28 DIAGNOSIS — K21.9 GASTROESOPHAGEAL REFLUX DISEASE WITHOUT ESOPHAGITIS: ICD-10-CM

## 2017-08-28 PROCEDURE — 99215 OFFICE O/P EST HI 40 MIN: CPT | Performed by: INTERNAL MEDICINE

## 2017-08-28 PROCEDURE — 70210 X-RAY EXAM OF SINUSES: CPT

## 2017-08-28 NOTE — NURSING NOTE
"Chief Complaint   Patient presents with     Results     Fatigue       Initial /60  Pulse 84  Temp 97.7  F (36.5  C) (Oral)  Resp 18  Wt 245 lb (111.1 kg)  LMP 08/24/2017  SpO2 99%  BMI 39.54 kg/m2 Estimated body mass index is 39.54 kg/(m^2) as calculated from the following:    Height as of 7/11/17: 5' 6\" (1.676 m).    Weight as of this encounter: 245 lb (111.1 kg).  Blood pressure completed using cuff size: large    "

## 2017-08-28 NOTE — MR AVS SNAPSHOT
After Visit Summary   8/28/2017    Dalia Hart    MRN: [id number]           Patient Information     Date Of Birth          1970        Visit Information        Provider Department      8/28/2017 3:00 PM Johnson Haile MD [facility]        Today's Diagnoses     Chronic fatigue    -  1    Fatigue, unspecified type        Vitamin B12 deficiency (non anemic)        Vitamin D deficiency        Gastroesophageal reflux disease without esophagitis        Congestion of paranasal sinus          Care Instructions    ** FOLLOW UP PLAN**:    PLEASE SCHEDULE E-VISIT ONCE YOU'VE HAD THE CT SCAN DONE TO FOLLOW UP ON THE RESULTS     TO INITIATE AN e-VISIT PLEASE GO UNDER THE SECURE MESSAGING TAB IN [employer] AND CLICK ON REQUEST e-VISIT TAB      YOU HAVE BEEN REFERRED FOR CT SCAN OF THE SINUSES TO ADDRESS ISSUES RAISED TODAY     PLEASE  MAKE SURE TO SCHEDULE YOUR CT SCAN APPOINTMENT(S) BY TELEPHONE      YOU MAY CONTACT THE CLINIC IF ANY QUESTIONS OR CONCERNS -[phone] OR VIA [employer]          Follow-ups after your visit        Future tests that were ordered for you today     Open Future Orders        Priority Expected Expires Ordered    CT Maxillofacial w/o Contrast Routine  8/28/2018 8/28/2017            Who to contact     If you have questions or need follow up information about today's clinic visit or your schedule please contact [facility] directly at [phone].  Normal or non-critical lab and imaging results will be communicated to you by AW-Energy[employer], letter or phone within 4 business days after the clinic has received the results. If you do not hear from us within 7 days, please contact the clinic through AW-Energy[employer] or phone. If you have a critical or abnormal lab result, we will notify you by phone as soon as possible.  Submit refill requests through [employer] or call your pharmacy and they will forward the refill request to us. Please allow 3  business days for your refill to be completed.          Additional Information About Your Visit        MyChart Information     [employer] gives you secure access to your electronic health record. If you see a primary care provider, you can also send messages to your care team and make appointments. If you have questions, please call your primary care clinic.  If you do not have a primary care provider, please call [phone] and they will assist you.        Care EveryWhere ID     This is your Care EveryWhere ID. This could be used by other organizations to access your [facility] medical records  [id number]        Your Vitals Were     Pulse Temperature Respirations Last Period Pulse Oximetry BMI (Body Mass Index)    84 97.7  F (36.5  C) (Oral) 18 08/24/2017 99% 39.54 kg/m2       Blood Pressure from Last 3 Encounters:   08/28/17 107/60   07/11/17 114/64   03/07/17 109/69    Weight from Last 3 Encounters:   08/28/17 245 lb (111.1 kg)   07/11/17 247 lb (112 kg)   03/07/17 250 lb (113.4 kg)              We Performed the Following     XR Sinus 1/2 Views          Where to get your medicines      These medications were sent to [facility] [address] [address]     Phone:  [phone]     Cyanocobalamin 5000 MCG/ML Liqd    omeprazole 20 MG CR capsule         Some of these will need a paper prescription and others can be bought over the counter.  Ask your nurse if you have questions.     You don't need a prescription for these medications     cholecalciferol 04023 UNITS capsule          Primary Care Provider Office Phone # Fax #    Johnson Haile [phone]       [address] [address]        Equal Access to Services     BARBARA LEMON AH: Nj Rider, dre reynaga, merary campuzano. So [facility] [phone].    ATENCIÓN: leatha Bryant  disposición servicios gratuitos de asistencia lingüística. Kendy kaminski [phone].    We comply with applicable federal civil rights laws and [place] laws. We do not discriminate on the basis of race, color, national origin, age, disability sex, sexual orientation or gender identity.            Thank you!     Thank you for choosing [facility]  for your care. Our goal is always to provide you with excellent care. Hearing back from our patients is one way we can continue to improve our services. Please take a few minutes to complete the written survey that you may receive in the mail after your visit with us. Thank you!             Your Updated Medication List - Protect others around you: Learn how to safely use, store and throw away your medicines at www.disposemymeds.org.          This list is accurate as of: 8/28/17  4:47 PM.  Always use your most recent med list.                   Brand Name Dispense Instructions for use Diagnosis    aspirin 81 MG chewable tablet      Take 81 mg by mouth daily        calcium-vitamin D-vitamin K 500-500-40 MG-UNT-MCG Chew    VIACTIV    100 tablet    Take 1 tablet by mouth 2 times daily (with meals) INDICATION: FOR BONE AND BREAST HEALTH    Vitamin D deficiency, Fatigue, unspecified type       cholecalciferol 56356 UNITS capsule    VITAMIN D3    40 capsule    SIG: TAKE 1 CAP TWICE A WEEK FOR 2 WEEKS, THEN ONE CAP EVERY OTHER WEEK, INDICATION: TO TREAT VITAMIN D DEFICIENCY (1ST AND 15TH OF EACH MONTH)    Fatigue, unspecified type, Vitamin D deficiency       Cyanocobalamin 5000 MCG/ML Liqd    B-12 SUPER STRENGTH    2 Bottle    Place 1 mL under the tongue every morning FOR VITAMIN B12 SUPPLEMENTATION, PLEASE PLACE UNDER THE TONGUE    Fatigue, unspecified type, Vitamin B12 deficiency (non anemic)       nicotine polacrilex 2 MG gum    NICORETTE    100 each    1 piece of gum every 1-2 hours as needed for smoking cessation    Encounter for smoking cessation  counseling       omeprazole 20 MG CR capsule    priLOSEC    90 capsule    Take 1 capsule (20 mg) by mouth every morning (before breakfast) INDICATION: TO CONTROL REFLUX SYMPTOMS    Gastroesophageal reflux disease without esophagitis       vitamin C-electrolytes 1000mg vitamin C super orange drink mix    EMERGEN-C    90 packet    Mix 1 packet in 4-6oz water and take once daily INDICATION: VITAMIN C SUPPLEMENTION    Low serum vitamin C

## 2017-08-28 NOTE — PROGRESS NOTES
SUBJECTIVE:   Dalia Hart is a 46 year old female who presents to clinic today for the following health issues:      Medication Followup of Fatigue vitamin deficiencies    Taking Medication as prescribed: yes    Side Effects:  None    Medication Helping Symptoms:  yes     She still continues to feel fatigued. Sleep study was negative. She reports worsening of fatigue after eating anything that has wheat    Other significant issues as outlined and addressed in the plan section of this note.    Recent labs are as noted and addressed in the plan section of this note.    Problem list and histories reviewed & adjusted, as indicated.  Additional history: as documented    Labs reviewed in EPIC    Reviewed and updated as needed this visit by clinical staffTobacco  Allergies  Meds  Med Hx  Surg Hx  Fam Hx  Soc Hx      Reviewed and updated as needed this visit by Provider         ROS:  14 point ROS negative except as above      OBJECTIVE:     /60  Pulse 84  Temp 97.7  F (36.5  C) (Oral)  Resp 18  Wt 245 lb (111.1 kg)  LMP 08/24/2017  SpO2 99%  BMI 39.54 kg/m2  Body mass index is 39.54 kg/(m^2).  GENERAL: healthy, alert and no distress  HENT: normal cephalic/atraumatic, ear canals and TM's normal, nasal mucosa edematous , oropharynx clear, oral mucous membranes moist and sinuses: maxillary tenderness on right  NECK: no adenopathy, no asymmetry, masses, or scars and thyroid normal to palpation  RESP: lungs clear to auscultation - no rales, rhonchi or wheezes  CV: regular rate and rhythm, normal S1 S2, no S3 or S4, no murmur, click or rub, no peripheral edema and peripheral pulses strong  ABDOMEN: soft, nontender, no hepatosplenomegaly, no masses and bowel sounds normal  MS: no gross musculoskeletal defects noted, no edema    Diagnostic Test Results:  Results for orders placed or performed in visit on 07/24/17   Vitamin D Deficiency   Result Value Ref Range    Vitamin D Deficiency screening 58 20 - 75  ug/L   Vitamin B12   Result Value Ref Range    Vitamin B12 1087 (H) 193 - 986 pg/mL   Hepatitis C Screen Reflex to HCV RNA Quant and Genotype   Result Value Ref Range    Hepatitis C Antibody  NR     Nonreactive   Assay performance characteristics have not been established for newborns,   infants, and children     Hepatitis B Surface Antibody   Result Value Ref Range    Hepatitis B Surface Antibody (H) <8.00 m[IU]/mL     >1000.00  Reactive, Patient is considered to be immune to infection with hepatitis B when   the value is greater than or equal to 12.0 m[IU]/mL.     Hepatitis B core antibody   Result Value Ref Range    Hepatitis B Core Anupama Nonreactive NR   Hepatitis A Antibody IgG   Result Value Ref Range    Hepatitis A Antibody IgG (A) NR     Reactive   This assay cannot be used for the diagnosis of acute HAV infection.         ASSESSMENT/PLAN:     DIAGNOSIS/PLAN:     ICD-10-CM    1. Chronic fatigue R53.82 XR Sinus 1/2 Views    ? CFS   2. Fatigue, unspecified type R53.83 Cyanocobalamin (B-12 SUPER STRENGTH) 5000 MCG/ML LIQD     cholecalciferol (VITAMIN D3) 74638 UNITS capsule     CT Maxillofacial w/o Contrast   3. Vitamin B12 deficiency (non anemic) E53.8 Cyanocobalamin (B-12 SUPER STRENGTH) 5000 MCG/ML LIQD   4. Vitamin D deficiency E55.9 cholecalciferol (VITAMIN D3) 26889 UNITS capsule   5. Gastroesophageal reflux disease without esophagitis K21.9 omeprazole (PRILOSEC) 20 MG CR capsule   6. Congestion of paranasal sinus R09.81 XR Sinus 1/2 Views     CT Maxillofacial w/o Contrast    ? NASAL SEPTAL DEVIATION       SIGNIFICANT ISSUES RE The primary encounter diagnosis was Chronic fatigue. Diagnoses of Fatigue, unspecified type, Vitamin B12 deficiency (non anemic), Vitamin D deficiency, Gastroesophageal reflux disease without esophagitis, and Congestion of paranasal sinus were also pertinent to this visit. AS NOTED AND ADDRESSED ABOVE   MEDS AND AND LABS AS ORDERED TO ADDRESS PREVIOUS AND CURRENT ABNORMAL  INDICES    SEE PATIENT INSTRUCTION SECTION FOR FOLLOW UP PLAN    Dalia IS TO CONTINUE OTHER TREATMENT REGIMEN/PLANS EXCEPT AS INDICATED    COMPLIANCE WITH MEDICATIONS DIET AND EXERCISE PLANS ENCOURAGED    DISCONTINUED MEDS:  Medications Discontinued During This Encounter   Medication Reason     Cyanocobalamin (B-12 SUPER STRENGTH) 5000 MCG/ML LIQD Reorder     cholecalciferol (VITAMIN D3) 24837 UNITS capsule Reorder     omeprazole (PRILOSEC) 20 MG capsule Reorder       CURRENT MED LIST WITH CHANGES AS NOTED BELOW:  Current Outpatient Prescriptions   Medication Sig Dispense Refill     Cyanocobalamin (B-12 SUPER STRENGTH) 5000 MCG/ML LIQD Place 1 mL under the tongue every morning FOR VITAMIN B12 SUPPLEMENTATION, PLEASE PLACE UNDER THE TONGUE 2 Bottle PRN     cholecalciferol (VITAMIN D3) 90284 UNITS capsule SIG: TAKE 1 CAP TWICE A WEEK FOR 2 WEEKS, THEN ONE CAP EVERY OTHER WEEK, INDICATION: TO TREAT VITAMIN D DEFICIENCY (1ST AND 15TH OF EACH MONTH) 40 capsule 0     omeprazole (PRILOSEC) 20 MG CR capsule Take 1 capsule (20 mg) by mouth every morning (before breakfast) INDICATION: TO CONTROL REFLUX SYMPTOMS 90 capsule 3     vitamin C-electrolytes (EMERGEN-C) 1000mg vitamin C super orange drink mix Mix 1 packet in 4-6oz water and take once daily INDICATION: VITAMIN C SUPPLEMENTION 90 packet PRN     calcium-vitamin D-vitamin K (VIACTIV) 500-500-40 MG-UNT-MCG CHEW Take 1 tablet by mouth 2 times daily (with meals) INDICATION: FOR BONE AND BREAST HEALTH 100 tablet PRN     nicotine polacrilex (NICORETTE) 2 MG gum 1 piece of gum every 1-2 hours as needed for smoking cessation 100 each 1     aspirin 81 MG chewable tablet Take 81 mg by mouth daily           Office visit time > 40 mins, greater than 50% of which was spent obtaining history, reviewing medications, counseling re compliance with treatment plan, discussion of treatment, follow up plans, and coordination of care.     Patient Instructions   ** FOLLOW UP PLAN**:    PLEASE  SCHEDULE E-VISIT ONCE YOU'VE HAD THE CT SCAN DONE TO FOLLOW UP ON THE RESULTS     TO INITIATE AN e-VISIT PLEASE GO UNDER THE SECURE MESSAGING TAB IN [email or web address] AND CLICK ON REQUEST e-VISIT TAB      YOU HAVE BEEN REFERRED FOR CT SCAN OF THE SINUSES TO ADDRESS ISSUES RAISED TODAY     PLEASE  MAKE SURE TO SCHEDULE YOUR CT SCAN APPOINTMENT(S) BY TELEPHONE      YOU MAY CONTACT THE CLINIC IF ANY QUESTIONS OR CONCERNS -[phone] OR VIA [email or web address]      Johnson Haile MD  [facility]

## 2017-08-28 NOTE — PATIENT INSTRUCTIONS
** FOLLOW UP PLAN**:    PLEASE SCHEDULE E-VISIT ONCE YOU'VE HAD THE CT SCAN DONE TO FOLLOW UP ON THE RESULTS     TO INITIATE AN e-VISIT PLEASE GO UNDER THE SECURE MESSAGING TAB IN [email or web address] AND CLICK ON REQUEST e-VISIT TAB      YOU HAVE BEEN REFERRED FOR CT SCAN OF THE SINUSES TO ADDRESS ISSUES RAISED TODAY     PLEASE  MAKE SURE TO SCHEDULE YOUR CT SCAN APPOINTMENT(S) BY TELEPHONE      YOU MAY CONTACT THE CLINIC IF ANY QUESTIONS OR CONCERNS -[phone] OR VIA [email or web address]

## 2017-10-09 ENCOUNTER — OFFICE VISIT (OUTPATIENT)
Dept: PODIATRY | Facility: CLINIC | Age: 47
End: 2017-10-09
Payer: COMMERCIAL

## 2017-10-09 VITALS
HEIGHT: 66 IN | DIASTOLIC BLOOD PRESSURE: 78 MMHG | WEIGHT: 250 LBS | BODY MASS INDEX: 40.18 KG/M2 | SYSTOLIC BLOOD PRESSURE: 124 MMHG

## 2017-10-09 DIAGNOSIS — M72.2 PLANTAR FASCIAL FIBROMATOSIS: Primary | ICD-10-CM

## 2017-10-09 DIAGNOSIS — M79.671 PAIN OF BOTH HEELS: ICD-10-CM

## 2017-10-09 DIAGNOSIS — M79.672 PAIN OF BOTH HEELS: ICD-10-CM

## 2017-10-09 PROCEDURE — 99203 OFFICE O/P NEW LOW 30 MIN: CPT | Performed by: PODIATRIST

## 2017-10-09 NOTE — PROGRESS NOTES
ASSESSMENT/PLAN:    Encounter Diagnoses   Name Primary?     plantar fasciosis Yes     Pain of both heels      Pt educated about the cause and nature of heel pain.  Treatment plan discussed includes icing, calf and plantar fascial stretching, avoidance of barefoot walking, wearing sturdy, supportive athletic-type shoes, and activity modification.  Educational handouts provided.    Pt is referred to the [facility] for prescription orthoses.      Trilok brace for more medial support, left foot    Body mass index is 40.35 kg/(m^2).    Weight management plan: Patient was referred to their PCP to discuss a diet and exercise plan.      Zackary Rodgers DPM, FACFAS, MS    [facility]      ____________________________________________________________________    HPI:         Chief Complaint: plantar fasciitis, L > R  Onset of problem: 6 months  Pain/ discomfort is described as:  Deep ache, throbbing, sharp  Ratin/10   Frequency:  morngings    The pain is made worse with working on concrete  Previous treatment: stretching, ice  *  Past Medical History:   Diagnosis Date     Anemia      Depressive disorder      Generalized anxiety disorder     Follows with psychiatrist   *  *  Past Surgical History:   Procedure Laterality Date     TONSILLECTOMY     *  *  Current Outpatient Prescriptions   Medication Sig Dispense Refill     Cyanocobalamin (B-12 SUPER STRENGTH) 5000 MCG/ML LIQD Place 1 mL under the tongue every morning FOR VITAMIN B12 SUPPLEMENTATION, PLEASE PLACE UNDER THE TONGUE 2 Bottle PRN     cholecalciferol (VITAMIN D3) 00602 UNITS capsule SIG: TAKE 1 CAP TWICE A WEEK FOR 2 WEEKS, THEN ONE CAP EVERY OTHER WEEK, INDICATION: TO TREAT VITAMIN D DEFICIENCY (1ST AND 15TH OF EACH MONTH) 40 capsule 0     omeprazole (PRILOSEC) 20 MG CR capsule Take 1 capsule (20 mg) by mouth every morning (before breakfast) INDICATION: TO CONTROL REFLUX SYMPTOMS 90 capsule 3  "    vitamin C-electrolytes (EMERGEN-C) 1000mg vitamin C super orange drink mix Mix 1 packet in 4-6oz water and take once daily INDICATION: VITAMIN C SUPPLEMENTION 90 packet PRN     calcium-vitamin D-vitamin K (VIACTIV) 500-500-40 MG-UNT-MCG CHEW Take 1 tablet by mouth 2 times daily (with meals) INDICATION: FOR BONE AND BREAST HEALTH 100 tablet PRN     nicotine polacrilex (NICORETTE) 2 MG gum 1 piece of gum every 1-2 hours as needed for smoking cessation 100 each 1     aspirin 81 MG chewable tablet Take 81 mg by mouth daily         ROS:     A 10-point review of systems was performed and is positive for that noted in the HPI and as seen below.  All other areas are negative.     Numbness in feet?  no   Calf pain with walking? yes  Recent foot/ankle injury? no  Weight change over past 12 months? no  Self perception as overweight? yes  Recent flu-like symptoms? no  Joint pain other than feet ? Hands, knees    Social History: Employment:  yes;  Exercise/Physical activity:  Not much due to foot pain;  Tobacco use:  1 cigarette per day  Social History     Social History     Marital status: Single     Spouse name: N/A     Number of children: 3     Years of education: N/A     Occupational History     [employer]     lactation resource spec     Social History Main Topics     Smoking status: Current Some Day Smoker     Smokeless tobacco: Never Used      Comment: 1 pack per month     Alcohol use No     Drug use: No     Sexual activity: Not Currently     Other Topics Concern     Not on file     Social History Narrative       Family history:  Family History   Problem Relation Age of Onset     Family History Negative Mother      Family History Negative Father      DIABETES Maternal Grandmother      Asthma Brother        Rheumatoid arthritis:  no  Foot Problems: parent with foot pain  Diabetes: grandparent      EXAM:    Vitals: /78 (BP Location: Left arm, Cuff Size: Adult Large)  Ht 5' 6\" (1.676 " "m)  Wt 250 lb (113.4 kg)  BMI 40.35 kg/m2  BMI: Body mass index is 40.35 kg/(m^2).  Height: 5' 6\"    Constitutional/ general:  Pt is in no apparent distress, appears well-nourished.  Cooperative with history and physical exam.     Vascular:  Pedal pulses are palpable bilaterally for both the DP and PT arteries.  CFT < 3 sec.  No edema.  Pedal hair growth noted.     Neuro:  Alert and oriented x 3. Coordinated gait.  Light touch sensation is intact to the L4, L5, S1 distributions. No obvious deficits.  No evidence of neurological-based weakness, spasticity, or contracture in the lower extremities.     Derm: Normal texture and turgor.  No erythema, ecchymosis, or cyanosis.  No open lesions.  Some dryness and callusing around bilateral heel.     Musculoskeletal:    Lower extremity muscle strength is normal.  Patient is ambulatory without an assistive device or brace .  No gross deformities.  Pain on palpation to the plantar medial aspect of the bilaeral heel.  No pain with side-to-side compression of the heels.        Zackary Rodgers DPM, FACFAS, MS    [facility]              "

## 2017-10-09 NOTE — NURSING NOTE
"Chief Complaint   Patient presents with     Foot Problems     x6 month pain in both heels        Initial /78 (BP Location: Left arm, Cuff Size: Adult Large)  Ht 5' 6\" (1.676 m)  Wt 250 lb (113.4 kg)  BMI 40.35 kg/m2 Estimated body mass index is 40.35 kg/(m^2) as calculated from the following:    Height as of this encounter: 5' 6\" (1.676 m).    Weight as of this encounter: 250 lb (113.4 kg).  Medication Reconciliation: complete   Samantha Dunaway MA      "

## 2017-10-09 NOTE — LETTER
10/9/2017         RE: Dalia Hart  [address]  [facility] [address]        Dear Colleague,    Thank you for referring your patient, Dalia Hart, to the [facility]. Please see a copy of my visit note below.      ASSESSMENT/PLAN:    Encounter Diagnoses   Name Primary?     plantar fasciosis Yes     Pain of both heels      Pt educated about the cause and nature of heel pain.  Treatment plan discussed includes icing, calf and plantar fascial stretching, avoidance of barefoot walking, wearing sturdy, supportive athletic-type shoes, and activity modification.  Educational handouts provided.    Pt is referred to the [facility] for prescription orthoses.      Trilok brace for more medial support, left foot    Body mass index is 40.35 kg/(m^2).    Weight management plan: Patient was referred to their PCP to discuss a diet and exercise plan.      Zackary Rodgers, MARIANN, FACFAS, MS    [facility]le [facility]      ____________________________________________________________________    HPI:         Chief Complaint: plantar fasciitis, L > R  Onset of problem: 6 months  Pain/ discomfort is described as:  Deep ache, throbbing, sharp  Ratin/10   Frequency:  morngings    The pain is made worse with working on concrete  Previous treatment: stretching, ice  *  Past Medical History:   Diagnosis Date     Anemia      Depressive disorder      Generalized anxiety disorder     Follows with psychiatrist   *  *  Past Surgical History:   Procedure Laterality Date     TONSILLECTOMY     *  *  Current Outpatient Prescriptions   Medication Sig Dispense Refill     Cyanocobalamin (B-12 SUPER STRENGTH) 5000 MCG/ML LIQD Place 1 mL under the tongue every morning FOR VITAMIN B12 SUPPLEMENTATION, PLEASE PLACE UNDER THE TONGUE 2 Bottle PRN     cholecalciferol (VITAMIN D3) 28409 UNITS capsule SIG: TAKE 1 CAP TWICE A WEEK FOR 2 WEEKS, THEN ONE CAP  EVERY OTHER WEEK, INDICATION: TO TREAT VITAMIN D DEFICIENCY (1ST AND 15TH OF EACH MONTH) 40 capsule 0     omeprazole (PRILOSEC) 20 MG CR capsule Take 1 capsule (20 mg) by mouth every morning (before breakfast) INDICATION: TO CONTROL REFLUX SYMPTOMS 90 capsule 3     vitamin C-electrolytes (EMERGEN-C) 1000mg vitamin C super orange drink mix Mix 1 packet in 4-6oz water and take once daily INDICATION: VITAMIN C SUPPLEMENTION 90 packet PRN     calcium-vitamin D-vitamin K (VIACTIV) 500-500-40 MG-UNT-MCG CHEW Take 1 tablet by mouth 2 times daily (with meals) INDICATION: FOR BONE AND BREAST HEALTH 100 tablet PRN     nicotine polacrilex (NICORETTE) 2 MG gum 1 piece of gum every 1-2 hours as needed for smoking cessation 100 each 1     aspirin 81 MG chewable tablet Take 81 mg by mouth daily         ROS:     A 10-point review of systems was performed and is positive for that noted in the HPI and as seen below.  All other areas are negative.     Numbness in feet?  no   Calf pain with walking? yes  Recent foot/ankle injury? no  Weight change over past 12 months? no  Self perception as overweight? yes  Recent flu-like symptoms? no  Joint pain other than feet ? Hands, knees    Social History: Employment:  yes;  Exercise/Physical activity:  Not much due to foot pain;  Tobacco use:  1 cigarette per day  Social History     Social History     Marital status: Single     Spouse name: N/A     Number of children: 3     Years of education: N/A     Occupational History     [employer] resource [employer]     Social History Main Topics     Smoking status: Current Some Day Smoker     Smokeless tobacco: Never Used      Comment: 1 pack per month     Alcohol use No     Drug use: No     Sexual activity: Not Currently     Other Topics Concern     Not on file     Social History Narrative       Family history:  Family History   Problem Relation Age of Onset     Family History Negative Mother      Family History  "Negative Father      DIABETES Maternal Grandmother      Asthma Brother        Rheumatoid arthritis:  no  Foot Problems: parent with foot pain  Diabetes: grandparent      EXAM:    Vitals: /78 (BP Location: Left arm, Cuff Size: Adult Large)  Ht 5' 6\" (1.676 m)  Wt 250 lb (113.4 kg)  BMI 40.35 kg/m2  BMI: Body mass index is 40.35 kg/(m^2).  Height: 5' 6\"    Constitutional/ general:  Pt is in no apparent distress, appears well-nourished.  Cooperative with history and physical exam.     Vascular:  Pedal pulses are palpable bilaterally for both the DP and PT arteries.  CFT < 3 sec.  No edema.  Pedal hair growth noted.     Neuro:  Alert and oriented x 3. Coordinated gait.  Light touch sensation is intact to the L4, L5, S1 distributions. No obvious deficits.  No evidence of neurological-based weakness, spasticity, or contracture in the lower extremities.     Derm: Normal texture and turgor.  No erythema, ecchymosis, or cyanosis.  No open lesions.  Some dryness and callusing around bilateral heel.     Musculoskeletal:    Lower extremity muscle strength is normal.  Patient is ambulatory without an assistive device or brace .  No gross deformities.  Pain on palpation to the plantar medial aspect of the bilaeral heel.  No pain with side-to-side compression of the heels.        Zackary Rodgers DPM, FACFAS, MS    [facility] & [facility]le [facility]                Again, thank you for allowing me to participate in the care of your patient.        Sincerely,        Zackary Rodgers DPM    "

## 2017-10-09 NOTE — MR AVS SNAPSHOT
After Visit Summary   10/9/2017    Dalia Hart    MRN: [id number]           Patient Information     Date Of Birth          1970        Visit Information        Provider Department      10/9/2017 9:15 AM Zackary Rodgers DPM [facility]        Care Instructions    Heel Pain:    Heel pain is usually an over-use type pain and involves the soft tissues around the heel.  When it first starts, the pain is believed to be from inflammation.  At this time ice, antiinflammatories, and injection therapy might be helpful.      However, the most important part of treatment focuses on foot mechanics.  Please realize that heel pain takes longer to resolve because of weight bearing.      Summary of recommendations:    1)  Wear more supportive, rigid-soled shoes more of the time. This includes inside your home.  Some athletic shoes, hiking shoes, etc.    2)  An over-the-counter insert in the $30-40 range might be helpful.  A prescription orthotic is a consideration for certain individuals.    3)  Avoid barefoot walking    4)  Gentle calf stretching    5)  Massage    Most heel pain will resolve in time with the above recommendations.  If your pain persists, then other treatment is considered:  Custom orthoses, night splints, physical therapy, immobilization in a walking cast.    PLANTAR FASCIITIS  Plantar fasciitis is often referred to as heel spurs or heel pain. Plantar fasciitis is a very common problem that affects people of all foot shapes, age, weight and activity level. Pain may be in the arch or on the weight-bearing surface of the heel. The pain may come on without injury or identifiable cause. Pain is generally present when first getting out of bed in the morning or up from a seated break.     CAUSES  The plantar fascia is a dense fibrous band of tissue that stretches across the bottom surface of the foot. The fascia helps support the foot muscles and arch. Plantar fasciitis  is thought to be caused by mechanical strain or overload. Frequent walking without shoes or wearing unsupportive shoes is thought to cause structural overload and ultimately inflammation of the plantar fascia. Some people have heel spurs that can be seen on x-ray. The heel spur is actually a minor component of plantar fascitis and is largely ignored.       SELF TREATMENT   The easiest solution is to stop walking around your home without shoes. Plantar fasciitis is largely a shoe problem. Shoes are either not being worn often enough or your current shoes are inadequate for your weight, foot structure or activity level. The majority of shoes on the market today are not sufficient to resist development of plantar fasciitis or to promote healing. Assume that your current shoes are inadequate and will need to be replaced. Even high quality shoes wear out with 6 months to one year of frequent use. Weight loss is another option. Losing ten pounds in the next two months may be enough to resolve the problem. Ice applied to the area of pain two to three times per day for ten minutes each session can be very helpful. Warm foot soaks in epsom salts can also relieve pain. This should continue until the problem resolves. Achilles tendon stretching is essential. Stretch multiple times daily to promote healing and to prevent recurrence in the future. Over all stretching of the body is helpful as well such as the calves, thighs and lower back. Normally when one area of the body is tight, other areas are too. Gentle Yoga can be good for this.     Over the counter topical anti inflammatories can be helpful such as biofreeze, bengay, salon pas, ect...  Oral ibuprofen or aleve is recommended as well to try to calm down inflammation.     Night splints can be helpful to gradually stretch the foot at night as a lot of pain is when you get up in the morning. Taking a towel or thera band and stretching the foot back multiple times before you  get ou of bed can be beneficial as well.     MEDICAL TREATMENT  Medical treatments often include custom arch supports, cortisone injections, physical therapy, splints to be worn in bed, prescription medications and surgery. The home treatments listed above will be necessary regardless of these advanced medical treatments. Surgery is rarely needed but is very helpful in selected cases.     PROGNOSIS  Plantar fasciitis can last from one day to a lifetime. Some people get intermittent fascitis that is very short-lived. Others suffer daily for years. Excessive body weight, frequent bare foot walking, long hours on the feet, inadequate shoes, predisposing foot structures and excessive activity such as running are all potential issues that lead to chronic and/or recurring plantar fascitis. Having plantar fasciitis means that you are forever prone to this problem and will require modification of some of the above factors. Most people seek treatment within one to four months. Healing usually requires a similar one to four month time frame. Healing time is relative to the amount of effort spent treating the problem.   Plantar fasciitis is highly recurrent. Risk factors often continue, including return to bare foot walking, inadequate shoes, excessive body weight, excessive activities, etc. Your life style and foot structure may predispose you to recurrent plantar fasciitis. A daily prevention regimen can be very helpful. Ongoing use of shoe inserts, careful attention to appropriate shoes, daily Achilles stretching, etc. may prevent recurrence. Prompt attention at the earliest warning signs of heel pain can resolve the problem in as short as a few days.     EXERCISES  Stair Exercise: Step on the stairs with the ball of your foot and hold your position for at least 15 seconds, then slowly step down with the heels of your foot. You can do this daily and as often as you want.   Picking the Towel: Sit comfortably and then pick  the towel up with your toes. You can use any object other than a towel as long as the material can be soft and you can pick it up with your toes.  Rolling the Bottle: Use a small ball or frozen water bottle and then roll it around with your foot.   Flex the Toes: Sit comfortably and then flex your toes by pointing it towards the floor or towards your body. This will relax and flex your foot and exercise your plantar fascia, the calf, and the Achilles tendon. The inability of the foot to stretch often causes the bunching up of the plantar fascia area leading to the pain.  Calf/Achilles Stretching: Lay on you back and raise one foot, then point your toes towards the floor. See photo below:               Hold each stretch for 10 seconds. Stretch 10 times per set, three sets per day. Morning, afternoon and evening. If your heel pain is very severe in the morning, consider doing the first set of stretches before you get out of bed.      OVER THE COUNTER INSERT RECOMMENDATIONS  SuperFeet   Sofsole Fit Spenco   Power Step   Walk-Fit Arch Cradles     Most of these can be found at your local [email or web address], [employer], or online.  **A good high quality over the counter insert should cost around $40-$50      [employer] LOCATIONS  [address]  [phone]   [address]  [phone] [place]  [address] [place] [address]  [phone]   [place]  [address]  [phone]   [place]  [address]  [phone] [place] [address]  [phone]   [place]  [address]  [phone]   [place]  [address] [place] [address]  [phone] [place]  [address]  [phone]                 Follow-ups after your visit        Who to contact     If you have questions or need follow up information about today's clinic visit or your schedule please contact [facility] directly at [phone].  Normal or non-critical  "lab and imaging results will be communicated to you by MyChart, letter or phone within 4 business days after the clinic has received the results. If you do not hear from us within 7 days, please contact the clinic through [employer] or phone. If you have a critical or abnormal lab result, we will notify you by phone as soon as possible.  Submit refill requests through [employer] or call your pharmacy and they will forward the refill request to us. Please allow 3 business days for your refill to be completed.          Additional Information About Your Visit        [employer] Information     [employer] gives you secure access to your electronic health record. If you see a primary care provider, you can also send messages to your care team and make appointments. If you have questions, please call your primary care clinic.  If you do not have a primary care provider, please call [phone] and they will assist you.        Care EveryWhere ID     This is your Care EveryWhere ID. This could be used by other organizations to access your [facility] medical records  [id number]        Your Vitals Were     Height BMI (Body Mass Index)                5' 6\" (1.676 m) 40.35 kg/m2           Blood Pressure from Last 3 Encounters:   10/09/17 124/78   08/28/17 107/60   07/11/17 114/64    Weight from Last 3 Encounters:   10/09/17 250 lb (113.4 kg)   08/28/17 245 lb (111.1 kg)   07/11/17 247 lb (112 kg)              Today, you had the following     No orders found for display       Primary Care Provider Office Phone # Fax #    Johnson Haile [phone]       [address] [place] [id number]        Equal Access to Services     [facility] AH: Hadii francisco Rider, walaurada luqadaha, qaybta kaalmerary rosenthal. So [facility] [phone].    ATENCIÓN: Si habla español, tiene a carrasco disposición servicios gratuitos de asistencia lingüística. Llame al [phone].    We comply with " applicable federal civil rights laws and [place] laws. We do not discriminate on the basis of race, color, national origin, age, disability, sex, sexual orientation, or gender identity.            Thank you!     Thank you for choosing [facility]  for your care. Our goal is always to provide you with excellent care. Hearing back from our patients is one way we can continue to improve our services. Please take a few minutes to complete the written survey that you may receive in the mail after your visit with us. Thank you!             Your Updated Medication List - Protect others around you: Learn how to safely use, store and throw away your medicines at www.disposemymeds.org.          This list is accurate as of: 10/9/17  9:36 AM.  Always use your most recent med list.                   Brand Name Dispense Instructions for use Diagnosis    aspirin 81 MG chewable tablet      Take 81 mg by mouth daily        calcium-vitamin D-vitamin K 500-500-40 MG-UNT-MCG Chew    VIACTIV    100 tablet    Take 1 tablet by mouth 2 times daily (with meals) INDICATION: FOR BONE AND BREAST HEALTH    Vitamin D deficiency, Fatigue, unspecified type       cholecalciferol 90773 UNITS capsule    VITAMIN D3    40 capsule    SIG: TAKE 1 CAP TWICE A WEEK FOR 2 WEEKS, THEN ONE CAP EVERY OTHER WEEK, INDICATION: TO TREAT VITAMIN D DEFICIENCY (1ST AND 15TH OF EACH MONTH)    Fatigue, unspecified type, Vitamin D deficiency       Cyanocobalamin 5000 MCG/ML Liqd    B-12 SUPER STRENGTH    2 Bottle    Place 1 mL under the tongue every morning FOR VITAMIN B12 SUPPLEMENTATION, PLEASE PLACE UNDER THE TONGUE    Fatigue, unspecified type, Vitamin B12 deficiency (non anemic)       nicotine polacrilex 2 MG gum    NICORETTE    100 each    1 piece of gum every 1-2 hours as needed for smoking cessation    Encounter for smoking cessation counseling       omeprazole 20 MG CR capsule    priLOSEC    90 capsule    Take 1 capsule (20 mg) by  mouth every morning (before breakfast) INDICATION: TO CONTROL REFLUX SYMPTOMS    Gastroesophageal reflux disease without esophagitis       vitamin C-electrolytes 1000mg vitamin C super orange drink mix    EMERGEN-C    90 packet    Mix 1 packet in 4-6oz water and take once daily INDICATION: VITAMIN C SUPPLEMENTION    Low serum vitamin C

## 2017-10-09 NOTE — PATIENT INSTRUCTIONS
Heel Pain:    Heel pain is usually an over-use type pain and involves the soft tissues around the heel.  When it first starts, the pain is believed to be from inflammation.  At this time ice, antiinflammatories, and injection therapy might be helpful.      However, the most important part of treatment focuses on foot mechanics.  Please realize that heel pain takes longer to resolve because of weight bearing.      Summary of recommendations:    1)  Wear more supportive, rigid-soled shoes more of the time. This includes inside your home.  Some athletic shoes, hiking shoes, etc.    2)  An over-the-counter insert in the $30-40 range might be helpful.  A prescription orthotic is a consideration for certain individuals.    3)  Avoid barefoot walking    4)  Gentle calf stretching    5)  Massage    Most heel pain will resolve in time with the above recommendations.  If your pain persists, then other treatment is considered:  Custom orthoses, night splints, physical therapy, immobilization in a walking cast.    PLANTAR FASCIITIS  Plantar fasciitis is often referred to as heel spurs or heel pain. Plantar fasciitis is a very common problem that affects people of all foot shapes, age, weight and activity level. Pain may be in the arch or on the weight-bearing surface of the heel. The pain may come on without injury or identifiable cause. Pain is generally present when first getting out of bed in the morning or up from a seated break.     CAUSES  The plantar fascia is a dense fibrous band of tissue that stretches across the bottom surface of the foot. The fascia helps support the foot muscles and arch. Plantar fasciitis is thought to be caused by mechanical strain or overload. Frequent walking without shoes or wearing unsupportive shoes is thought to cause structural overload and ultimately inflammation of the plantar fascia. Some people have heel spurs that can be seen on x-ray. The heel spur is actually a minor component of  plantar fascitis and is largely ignored.       SELF TREATMENT   The easiest solution is to stop walking around your home without shoes. Plantar fasciitis is largely a shoe problem. Shoes are either not being worn often enough or your current shoes are inadequate for your weight, foot structure or activity level. The majority of shoes on the market today are not sufficient to resist development of plantar fasciitis or to promote healing. Assume that your current shoes are inadequate and will need to be replaced. Even high quality shoes wear out with 6 months to one year of frequent use. Weight loss is another option. Losing ten pounds in the next two months may be enough to resolve the problem. Ice applied to the area of pain two to three times per day for ten minutes each session can be very helpful. Warm foot soaks in epsom salts can also relieve pain. This should continue until the problem resolves. Achilles tendon stretching is essential. Stretch multiple times daily to promote healing and to prevent recurrence in the future. Over all stretching of the body is helpful as well such as the calves, thighs and lower back. Normally when one area of the body is tight, other areas are too. Gentle Yoga can be good for this.     Over the counter topical anti inflammatories can be helpful such as biofreeze, bengay, salon pas, ect...  Oral ibuprofen or aleve is recommended as well to try to calm down inflammation.     Night splints can be helpful to gradually stretch the foot at night as a lot of pain is when you get up in the morning. Taking a towel or thera band and stretching the foot back multiple times before you get ou of bed can be beneficial as well.     MEDICAL TREATMENT  Medical treatments often include custom arch supports, cortisone injections, physical therapy, splints to be worn in bed, prescription medications and surgery. The home treatments listed above will be necessary regardless of these advanced medical  treatments. Surgery is rarely needed but is very helpful in selected cases.     PROGNOSIS  Plantar fasciitis can last from one day to a lifetime. Some people get intermittent fascitis that is very short-lived. Others suffer daily for years. Excessive body weight, frequent bare foot walking, long hours on the feet, inadequate shoes, predisposing foot structures and excessive activity such as running are all potential issues that lead to chronic and/or recurring plantar fascitis. Having plantar fasciitis means that you are forever prone to this problem and will require modification of some of the above factors. Most people seek treatment within one to four months. Healing usually requires a similar one to four month time frame. Healing time is relative to the amount of effort spent treating the problem.   Plantar fasciitis is highly recurrent. Risk factors often continue, including return to bare foot walking, inadequate shoes, excessive body weight, excessive activities, etc. Your life style and foot structure may predispose you to recurrent plantar fasciitis. A daily prevention regimen can be very helpful. Ongoing use of shoe inserts, careful attention to appropriate shoes, daily Achilles stretching, etc. may prevent recurrence. Prompt attention at the earliest warning signs of heel pain can resolve the problem in as short as a few days.     EXERCISES  Stair Exercise: Step on the stairs with the ball of your foot and hold your position for at least 15 seconds, then slowly step down with the heels of your foot. You can do this daily and as often as you want.   Picking the Towel: Sit comfortably and then pick the towel up with your toes. You can use any object other than a towel as long as the material can be soft and you can pick it up with your toes.  Rolling the Bottle: Use a small ball or frozen water bottle and then roll it around with your foot.   Flex the Toes: Sit comfortably and then flex your toes by  pointing it towards the floor or towards your body. This will relax and flex your foot and exercise your plantar fascia, the calf, and the Achilles tendon. The inability of the foot to stretch often causes the bunching up of the plantar fascia area leading to the pain.  Calf/Achilles Stretching: Lay on you back and raise one foot, then point your toes towards the floor. See photo below:               Hold each stretch for 10 seconds. Stretch 10 times per set, three sets per day. Morning, afternoon and evening. If your heel pain is very severe in the morning, consider doing the first set of stretches before you get out of bed.      OVER THE COUNTER INSERT RECOMMENDATIONS  SuperFeet   Sofsole Fit Spenco   Power Step   Walk-Fit Arch Cradles     Most of these can be found at your local [employer], sporting [employer] stores, or online.  **A good high quality over the counter insert should cost around $40-$50      [employer] LOCATIONS  [address]  [phone]   [address]  [phone] [place]  [address] [place] [address]  [phone]   [place]  [address]  [phone]   [address]  [address]  [phone] [place] [address]  [phone]   [place]  [address]  [phone]   [place]  [address]  [phone] [place]  [address]  [phone]

## 2017-10-16 ENCOUNTER — OFFICE VISIT (OUTPATIENT)
Dept: INTERNAL MEDICINE | Facility: CLINIC | Age: 47
End: 2017-10-16
Payer: COMMERCIAL

## 2017-10-16 ENCOUNTER — MYC MEDICAL ADVICE (OUTPATIENT)
Dept: INTERNAL MEDICINE | Facility: CLINIC | Age: 47
End: 2017-10-16

## 2017-10-16 VITALS
WEIGHT: 253.1 LBS | DIASTOLIC BLOOD PRESSURE: 58 MMHG | SYSTOLIC BLOOD PRESSURE: 90 MMHG | BODY MASS INDEX: 40.67 KG/M2 | HEIGHT: 66 IN | OXYGEN SATURATION: 98 % | HEART RATE: 79 BPM | TEMPERATURE: 98.5 F

## 2017-10-16 DIAGNOSIS — R55 SYNCOPE, UNSPECIFIED SYNCOPE TYPE: ICD-10-CM

## 2017-10-16 DIAGNOSIS — Z23 NEED FOR PROPHYLACTIC VACCINATION AND INOCULATION AGAINST INFLUENZA: ICD-10-CM

## 2017-10-16 DIAGNOSIS — R53.83 FATIGUE, UNSPECIFIED TYPE: Primary | ICD-10-CM

## 2017-10-16 DIAGNOSIS — Z71.6 ENCOUNTER FOR SMOKING CESSATION COUNSELING: ICD-10-CM

## 2017-10-16 DIAGNOSIS — Z72.0 TOBACCO ABUSE: ICD-10-CM

## 2017-10-16 PROCEDURE — 90471 IMMUNIZATION ADMIN: CPT | Performed by: PHYSICIAN ASSISTANT

## 2017-10-16 PROCEDURE — 99214 OFFICE O/P EST MOD 30 MIN: CPT | Mod: 25 | Performed by: PHYSICIAN ASSISTANT

## 2017-10-16 PROCEDURE — 90686 IIV4 VACC NO PRSV 0.5 ML IM: CPT | Performed by: PHYSICIAN ASSISTANT

## 2017-10-16 NOTE — MR AVS SNAPSHOT
After Visit Summary   10/16/2017    Dalia Hart    MRN: [id number]           Patient Information     Date Of Birth          1970        Visit Information        Provider Department      10/16/2017 3:00 PM Zoe Wallace PA-C [facility]        Today's Diagnoses     Fatigue, unspecified type    -  1    Encounter for smoking cessation counseling        Tobacco abuse        Need for prophylactic vaccination and inoculation against influenza        Syncope, unspecified syncope type           Follow-ups after your visit        Your next 10 appointments already scheduled     Oct 24, 2017  2:00 PM CDT   Ech Complete with [id number]   [facility] CV Echocardiography (Cardiovascular Imaging at [facility])    [address]  [address]  [facility] [id number] [phone]           1. Please bring or wear a comfortable two-piece outfit. 2. You may eat, drink and take your normal medicines. 3. For any questions that cannot be answered, please contact the ordering physician            Oct 27, 2017 11:00 AM CDT   New Visit with Tiffanie Tamayo MD   [facility] [place] ([facility])    [address]  [facility] [facility] [id number] [phone]              Who to contact     If you have questions or need follow up information about today's clinic visit or your schedule please contact [facility] directly at [phone].  Normal or non-critical lab and imaging results will be communicated to you by MyChart, letter or phone within 4 business days after the clinic has received the results. If you do not hear from us within 7 days, please contact the clinic through MyChart or phone. If you have a critical or abnormal lab result, we will notify you by phone as soon as possible.  Submit refill requests through [employer] or call your pharmacy and they will forward the refill request  "to us. Please allow 3 business days for your refill to be completed.          Additional Information About Your Visit        [employer] Information     [employer] gives you secure access to your electronic health record. If you see a primary care provider, you can also send messages to your care team and make appointments. If you have questions, please call your primary care clinic.  If you do not have a primary care provider, please call [phone] and they will assist you.        Care EveryWhere ID     This is your Care EveryWhere ID. This could be used by other organizations to access your [facility] medical records  [id number]        Your Vitals Were     Pulse Temperature Height Last Period Pulse Oximetry BMI (Body Mass Index)    79 98.5  F (36.9  C) (Oral) 5' 6\" (1.676 m) 09/18/2017 98% 40.85 kg/m2       Blood Pressure from Last 3 Encounters:   10/16/17 90/58   10/09/17 124/78   08/28/17 107/60    Weight from Last 3 Encounters:   10/16/17 253 lb 1.6 oz (114.8 kg)   10/09/17 250 lb (113.4 kg)   08/28/17 245 lb (111.1 kg)              We Performed the Following     FLU VAC, SPLIT VIRUS IM > 3 YO (QUADRIVALENT) [76440]     Vaccine Administration, Initial [44265]          Where to get your medicines      These medications were sent to [facility] [address] [address]     Phone:  [phone]     nicotine polacrilex 2 MG gum          Primary Care Provider Office Phone # Fax #    Johnson Haile [phone]       [address] [address]        Equal Access to Services     [facility] XOCHILT AH: Nj Rider, wakeila reynaga, denise kaalmamerary brown. So [facility] [phone].    ATENCIÓN: Si habla español, tiene a carrasco disposición servicios gratuitos de asistencia lingüística. Llame al [phone].    We comply with applicable federal civil rights laws and [place] laws. " We do not discriminate on the basis of race, color, national origin, age, disability, sex, sexual orientation, or gender identity.            Thank you!     Thank you for choosing [facility]  for your care. Our goal is always to provide you with excellent care. Hearing back from our patients is one way we can continue to improve our services. Please take a few minutes to complete the written survey that you may receive in the mail after your visit with us. Thank you!             Your Updated Medication List - Protect others around you: Learn how to safely use, store and throw away your medicines at www.disposemymeds.org.          This list is accurate as of: 10/16/17 11:59 PM.  Always use your most recent med list.                   Brand Name Dispense Instructions for use Diagnosis    aspirin 81 MG chewable tablet      Take 81 mg by mouth daily        calcium-vitamin D-vitamin K 500-500-40 MG-UNT-MCG Chew    VIACTIV    100 tablet    Take 1 tablet by mouth 2 times daily (with meals) INDICATION: FOR BONE AND BREAST HEALTH    Vitamin D deficiency, Fatigue, unspecified type       Cyanocobalamin 5000 MCG/ML Liqd    B-12 SUPER STRENGTH    2 Bottle    Place 1 mL under the tongue every morning FOR VITAMIN B12 SUPPLEMENTATION, PLEASE PLACE UNDER THE TONGUE    Fatigue, unspecified type, Vitamin B12 deficiency (non anemic)       nicotine polacrilex 2 MG gum    NICORETTE    100 each    1 piece of gum every 1-2 hours as needed for smoking cessation    Encounter for smoking cessation counseling       omeprazole 20 MG CR capsule    priLOSEC    90 capsule    Take 1 capsule (20 mg) by mouth every morning (before breakfast) INDICATION: TO CONTROL REFLUX SYMPTOMS    Gastroesophageal reflux disease without esophagitis       order for DME     1 Device    Trilok ankle brace    Pain of both heels, Plantar fascial fibromatosis       vitamin C-electrolytes 1000mg vitamin C super orange drink mix    EMERGEN-C    90  packet    Mix 1 packet in 4-6oz water and take once daily INDICATION: VITAMIN C SUPPLEMENTION    Low serum vitamin C

## 2017-10-16 NOTE — PROGRESS NOTES
SUBJECTIVE:   Dalia Hart is a 47 year old female who presents to clinic today for the following health issues:      ED/UC Followup:    Facility:  [facility] ED  Date of visit: 10/15/2016  Reason for visit: Black out, syncope   Current Status: Patient is still feeling weak and having some SOB       Review of chart, shows pt ER visit yesterday. She had a normal ECG, cardiac enzymes, cbc and CMP. Also on review of chart pt has been in contact with her primary provider about this issue.  Her provider has recommended and referred pt for an echocardiogram and cardiology appt.     [place]. She reports she suddenly didn't feel good and then she started toward the door. As she was making her way to the door she needed to sit down. Her friends helped her sit down as she fainted. She reports she did not fall out of the chair as her friends were able to hold her up. Her friends did not report any seizure like movements. 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 Pt does report she had one episode of vomiting after this episode. She had no chest pain, palpitations or shortness of breath preceding or immediately afterwards. Pt reports she has been getting over a mild cold. Pt reports today she feels weak and feels like she can not take a full breath. She has no chest pain, shortness of breath or fever today. She has not felt dehydrated. Pt reports she has been seeing her primary care provider for ongoing fatigue without resolution. She also requests I refill her nicotine gum as she is working on quitting smoking. She has no further symptoms or concerns for today's visit.         Problem list and histories reviewed & adjusted, as indicated.  Additional history: as documented    Reviewed and updated as needed this visit by clinical staff  Tobacco  Allergies  Problems  Med Hx  Surg Hx  Fam Hx  Soc Hx      Reviewed and updated as needed  "this visit by Provider  Tobacco  Problems  Med Hx  Surg Hx  Fam Hx  Soc Hx        ROS:  Constitutional, HEENT, cardiovascular, pulmonary, GI, , musculoskeletal, neuro, skin, endocrine and psych systems are negative, except as otherwise noted.      OBJECTIVE:   BP 90/58  Pulse 79  Temp 98.5  F (36.9  C) (Oral)  Ht 5' 6\" (1.676 m)  Wt 253 lb 1.6 oz (114.8 kg)  LMP 09/18/2017  SpO2 98%  BMI 40.85 kg/m2  Body mass index is 40.85 kg/(m^2).  GENERAL: healthy, alert and no distress  EYES: Eyes grossly normal to inspection, PERRL and conjunctivae and sclerae normal  HENT: normal cephalic/atraumatic, ear canals and TM's normal, nose and mouth without ulcers or lesions, oropharynx clear and oral mucous membranes moist  NECK: no adenopathy, no asymmetry, masses, or scars and thyroid normal to palpation  RESP: lungs clear to auscultation - no rales, rhonchi or wheezes  CV: regular rate and rhythm, normal S1 S2, no S3 or S4, no murmur, click or rub, no peripheral edema and peripheral pulses strong  MS: no gross musculoskeletal defects noted, no edema  NEURO: Normal strength and tone, mentation intact and speech normal    Diagnostic Test Results:  none     ASSESSMENT/PLAN:     ED Follow up   Syncope, unspecified syncope type  1. Fatigue, unspecified type  -reviewed ED notes.   -Pt has already been referred to cardiology with echocardiogram.   -add on TSH as this has not been checked within last year.   -TSH with free T4 reflex FUTURE anytime; Future  -did offer a CXR today despite normal lung exam, but pt declined.  -discussed follow up and when to seek emergent treatment.       2. Encounter for smoking cessation counseling  3. Tobacco abuse  - nicotine polacrilex (NICORETTE) 2 MG gum; 1 piece of gum every 1-2 hours as needed for smoking cessation  Dispense: 100 each; Refill: 1      4. Need for prophylactic vaccination and inoculation against influenza  - FLU VAC, SPLIT VIRUS IM > 3 YO (QUADRIVALENT) [31927]  - " Vaccine Administration, Initial [48707]      Patient agreed to the above plan and all questions were answered.      Zoe Wallace PA-C  [facility]

## 2017-10-16 NOTE — NURSING NOTE
"Chief Complaint   Patient presents with     ER F/U       Initial BP 90/58  Pulse 79  Temp 98.5  F (36.9  C) (Oral)  Ht 5' 6\" (1.676 m)  Wt 253 lb 1.6 oz (114.8 kg)  LMP 09/18/2017  SpO2 98%  BMI 40.85 kg/m2 Estimated body mass index is 40.85 kg/(m^2) as calculated from the following:    Height as of this encounter: 5' 6\" (1.676 m).    Weight as of this encounter: 253 lb 1.6 oz (114.8 kg).  Medication Reconciliation: complete   Patrizia Middleton MA   "

## 2017-10-24 ENCOUNTER — HOSPITAL ENCOUNTER (OUTPATIENT)
Dept: CARDIOLOGY | Facility: CLINIC | Age: 47
Discharge: HOME | End: 2017-10-24
Attending: INTERNAL MEDICINE | Admitting: INTERNAL MEDICINE
Payer: COMMERCIAL

## 2017-10-24 DIAGNOSIS — R55 SYNCOPE, UNSPECIFIED SYNCOPE TYPE: ICD-10-CM

## 2017-10-24 DIAGNOSIS — R53.83 FATIGUE, UNSPECIFIED TYPE: ICD-10-CM

## 2017-10-24 PROCEDURE — 40000264 ECHO BUBBLE STUDY W/LUMASON

## 2017-10-24 PROCEDURE — 25500064 ZZH RX 255 OP 636: Performed by: INTERNAL MEDICINE

## 2017-10-24 PROCEDURE — 93306 TTE W/DOPPLER COMPLETE: CPT | Mod: 26 | Performed by: INTERNAL MEDICINE

## 2017-10-24 RX ADMIN — SULFUR HEXAFLUORIDE 2 ML: KIT at 15:06

## 2017-10-27 ENCOUNTER — OFFICE VISIT (OUTPATIENT)
Dept: CARDIOLOGY | Facility: CLINIC | Age: 47
End: 2017-10-27
Payer: COMMERCIAL

## 2017-10-27 VITALS
BODY MASS INDEX: 41.16 KG/M2 | HEART RATE: 77 BPM | HEIGHT: 66 IN | WEIGHT: 256.1 LBS | SYSTOLIC BLOOD PRESSURE: 96 MMHG | DIASTOLIC BLOOD PRESSURE: 68 MMHG

## 2017-10-27 DIAGNOSIS — E78.5 HYPERLIPIDEMIA WITH TARGET LDL LESS THAN 160: Primary | ICD-10-CM

## 2017-10-27 PROCEDURE — 99204 OFFICE O/P NEW MOD 45 MIN: CPT | Performed by: INTERNAL MEDICINE

## 2017-10-27 PROCEDURE — 93000 ELECTROCARDIOGRAM COMPLETE: CPT | Performed by: INTERNAL MEDICINE

## 2017-10-27 NOTE — MR AVS SNAPSHOT
"              After Visit Summary   10/27/2017    Dalia Hart    MRN: [id number]           Patient Information     Date Of Birth          1970        Visit Information        Provider Department      10/27/2017 11:15 AM Zahra Hoffman DO [facility] [place]        Today's Diagnoses     Hyperlipidemia with target LDL less than 160    -  1       Follow-ups after your visit        Who to contact     If you have questions or need follow up information about today's clinic visit or your schedule please contact [facility] directly at [phone].  Normal or non-critical lab and imaging results will be communicated to you by Dinero[employer], letter or phone within 4 business days after the clinic has received the results. If you do not hear from us within 7 days, please contact the clinic through [employer] or phone. If you have a critical or abnormal lab result, we will notify you by phone as soon as possible.  Submit refill requests through [employer] or call your pharmacy and they will forward the refill request to us. Please allow 3 business days for your refill to be completed.          Additional Information About Your Visit        MyChart Information     [employer] gives you secure access to your electronic health record. If you see a primary care provider, you can also send messages to your care team and make appointments. If you have questions, please call your primary care clinic.  If you do not have a primary care provider, please call [phone] and they will assist you.        Care EveryWhere ID     This is your Care EveryWhere ID. This could be used by other organizations to access your [facility] medical records  [id number]        Your Vitals Were     Pulse Height Last Period BMI (Body Mass Index)          77 1.676 m (5' 6\") 09/18/2017 41.34 kg/m2         Blood Pressure from Last 3 Encounters:   10/27/17 96/68   10/16/17 " 90/58   10/09/17 124/78    Weight from Last 3 Encounters:   10/27/17 116.2 kg (256 lb 1.6 oz)   10/16/17 114.8 kg (253 lb 1.6 oz)   10/09/17 113.4 kg (250 lb)              We Performed the Following     EKG 12-lead complete w/read - Clinics (performed today)          Today's Medication Changes          These changes are accurate as of: 10/27/17 12:00 PM.  If you have any questions, ask your nurse or doctor.               These medicines have changed or have updated prescriptions.        Dose/Directions    calcium-vitamin D-vitamin K 500-500-40 MG-UNT-MCG Chew   Commonly known as:  VIACTIV   This may have changed:    - when to take this  - reasons to take this  - additional instructions   Used for:  Vitamin D deficiency, Fatigue, unspecified type        Dose:  1 tablet   Take 1 tablet by mouth 2 times daily (with meals) INDICATION: FOR BONE AND BREAST HEALTH   Quantity:  100 tablet   Refills:  PRN       Cyanocobalamin 5000 MCG/ML Liqd   Commonly known as:  B-12 SUPER STRENGTH   This may have changed:  additional instructions   Used for:  Fatigue, unspecified type, Vitamin B12 deficiency (non anemic)        Dose:  1 mL   Place 1 mL under the tongue every morning FOR VITAMIN B12 SUPPLEMENTATION, PLEASE PLACE UNDER THE TONGUE   Quantity:  2 Bottle   Refills:  PRN       nicotine polacrilex 2 MG gum   Commonly known as:  NICORETTE   This may have changed:    - when to take this  - reasons to take this  - additional instructions   Used for:  Encounter for smoking cessation counseling        1 piece of gum every 1-2 hours as needed for smoking cessation   Quantity:  100 each   Refills:  1       omeprazole 20 MG CR capsule   Commonly known as:  priLOSEC   This may have changed:    - when to take this  - reasons to take this  - additional instructions   Used for:  Gastroesophageal reflux disease without esophagitis        Dose:  20 mg   Take 1 capsule (20 mg) by mouth every morning (before breakfast) INDICATION: TO CONTROL  REFLUX SYMPTOMS   Quantity:  90 capsule   Refills:  3                Primary Care Provider Office Phone # Fax #    Johnson Haile [phone]       [address] [place] [address]        Equal Access to Services     BARBARA LEMON : Nj francisco cunningham nessa Soevita, waaxda luqadaha, qaybta kaalmada adejerrell, merary brannonin hayaayovany kearns mercyjavon arreguin. So [facility] [phone].    ATENCIÓN: Si habla español, tiene a carrasco disposición servicios gratuitos de asistencia lingüística. Llame al [phone].    We comply with applicable federal civil rights laws and [place] laws. We do not discriminate on the basis of race, color, national origin, age, disability, sex, sexual orientation, or gender identity.            Thank you!     Thank you for choosing [facility] [place]  for your care. Our goal is always to provide you with excellent care. Hearing back from our patients is one way we can continue to improve our services. Please take a few minutes to complete the written survey that you may receive in the mail after your visit with us. Thank you!             Your Updated Medication List - Protect others around you: Learn how to safely use, store and throw away your medicines at www.disposemymeds.org.          This list is accurate as of: 10/27/17 12:00 PM.  Always use your most recent med list.                   Brand Name Dispense Instructions for use Diagnosis    aspirin 81 MG chewable tablet      Take 81 mg by mouth daily        calcium-vitamin D-vitamin K 500-500-40 MG-UNT-MCG Chew    VIACTIV    100 tablet    Take 1 tablet by mouth 2 times daily (with meals) INDICATION: FOR BONE AND BREAST HEALTH    Vitamin D deficiency, Fatigue, unspecified type       cholecalciferol 09649 UNITS capsule    VITAMIN D3    40 capsule    SIG: TAKE 1 CAPSULE 1ST AND 15TH OF EACH MONTH    Fatigue, unspecified type, Vitamin D deficiency       Cyanocobalamin 5000 MCG/ML Liqd    B-12 SUPER  STRENGTH    2 Bottle    Place 1 mL under the tongue every morning FOR VITAMIN B12 SUPPLEMENTATION, PLEASE PLACE UNDER THE TONGUE    Fatigue, unspecified type, Vitamin B12 deficiency (non anemic)       nicotine polacrilex 2 MG gum    NICORETTE    100 each    1 piece of gum every 1-2 hours as needed for smoking cessation    Encounter for smoking cessation counseling       omeprazole 20 MG CR capsule    priLOSEC    90 capsule    Take 1 capsule (20 mg) by mouth every morning (before breakfast) INDICATION: TO CONTROL REFLUX SYMPTOMS    Gastroesophageal reflux disease without esophagitis       order for DME     1 Device    Trilok ankle brace    Pain of both heels, Plantar fascial fibromatosis       vitamin C-electrolytes 1000mg vitamin C super orange drink mix    EMERGEN-C    90 packet    Mix 1 packet in 4-6oz water and take once daily INDICATION: VITAMIN C SUPPLEMENTION    Low serum vitamin C

## 2017-10-27 NOTE — LETTER
10/27/2017    [place] [address]    RE: Dalia Hart       Dear Colleague,    I had the pleasure of seeing Dalia Hart in the [facility].    Ms. Hart is a pleasant 47-year-old female who comes into clinic today after being evaluated in the emergency room for an episode of syncope.  Ms. Hart he tells me she has had episodes of near-syncope for many years now.  She does have triggers including blood draws, needles, watching her children at the dentist or getting a shot can trigger near fainting episodes.      With this particular episode, she did not notice a trigger.  She had not been drinking anything.  She was walking out of a building and suddenly became hot, sweaty and nauseous with tunneling of vision.  She was able to alert her friends that she was feeling faint and they were able sit her down before she actually did faint.  She describes a sensation of fatigue afterwards that can last several hours.  She denies ever having injured herself with these falls and according to the ER record, she did not have any seizure-like activity or loss of bowel or bladder.        She did have an echocardiogram done on the 10/24, which I reviewed.  This was essentially normal.  I repeated an EKG today, which I have reviewed.  This also appears very normal.        I reviewed her medication list; she takes a baby aspirin and some multivitamins as well as acid suppressant agent.  She is on Nicorette gum trying to quit smoking currently.  She does not use alcohol.  She drinks about 1-3 caffeinated beverages per day.      PHYSICAL EXAMINATION:  Today, she was orthostatic with a blood pressure drop greater than 10 with standing after 1 minute.  Her sitting blood pressure was 96/68, pulse is 77, weight 256 with a body mass index of 41.  Carotid upstrokes seem brisk without bruit.  Cardiovascular tones are regular.  I do not appreciate a murmur, gallop or  rub.  Lungs are clear posteriorly.  She has palpable pulses in the distal extremities without peripheral edema.     Outpatient Encounter Prescriptions as of 10/27/2017   Medication Sig Dispense Refill     cholecalciferol (VITAMIN D3) 28450 UNITS capsule SIG: TAKE 1 CAPSULE 1ST AND 15TH OF EACH MONTH 40 capsule 0     nicotine polacrilex (NICORETTE) 2 MG gum 1 piece of gum every 1-2 hours as needed for smoking cessation (Patient taking differently: as needed 1 piece of gum every 1-2 hours as needed for smoking cessation) 100 each 1     order for DME Trilok ankle brace 1 Device 0     Cyanocobalamin (B-12 SUPER STRENGTH) 5000 MCG/ML LIQD Place 1 mL under the tongue every morning FOR VITAMIN B12 SUPPLEMENTATION, PLEASE PLACE UNDER THE TONGUE (Patient taking differently: Place 1 mL under the tongue every morning FOR VITAMIN B12 SUPPLEMENTATION, PLEASE PLACE UNDER THE TONGUE PRN) 2 Bottle PRN     omeprazole (PRILOSEC) 20 MG CR capsule Take 1 capsule (20 mg) by mouth every morning (before breakfast) INDICATION: TO CONTROL REFLUX SYMPTOMS (Patient taking differently: Take 20 mg by mouth daily as needed INDICATION: TO CONTROL REFLUX SYMPTOMS) 90 capsule 3     vitamin C-electrolytes (EMERGEN-C) 1000mg vitamin C super orange drink mix Mix 1 packet in 4-6oz water and take once daily INDICATION: VITAMIN C SUPPLEMENTION 90 packet PRN     calcium-vitamin D-vitamin K (VIACTIV) 500-500-40 MG-UNT-MCG CHEW Take 1 tablet by mouth 2 times daily (with meals) INDICATION: FOR BONE AND BREAST HEALTH (Patient taking differently: Take 1 tablet by mouth daily as needed INDICATION: FOR BONE AND BREAST HEALTH) 100 tablet PRN     aspirin 81 MG chewable tablet Take 81 mg by mouth daily       No facility-administered encounter medications on file as of 10/27/2017.       IMPRESSION:   In summary, Ms. Hart is a very pleasant 47-year-old female with a reported history of near-syncopal episodes with a recent syncopal episode that she did not note a  trigger.  Her history and recent episode are consistent with vasovagal syncope and she does demonstrate mild orthostasis on exam today.  EKG and echocardiogram are normal.        I have recommended at this time lifestyle modifications including liberal fluid and salt intake as well as compression socks.  She should avoid alcohol, reduce her caffeine intake and avoid triggers that she has known for many years that can precipitate her symptoms.  I did discuss with her if all of these changes are ineffective, there are medications we can use, such as midodrine, to help raise her blood pressure to avoid near-syncopal episodes in the future.  She is willing to try the lifestyle modifications first and I would be happy to see her back at any time should she fail the lifestyle modifications or have increased frequency of symptoms.        Please feel free to contact me with any questions you have in regards to her care.  Thank you for allowing me to participate in the care of this very nice patient.     Sincerely,    Zahra Hoffman, DO     [facility]

## 2017-10-27 NOTE — PROGRESS NOTES
HPI and Plan:   See dictation    Orders Placed This Encounter   Procedures     EKG 12-lead complete w/read - Clinics (performed today)       No orders of the defined types were placed in this encounter.      There are no discontinued medications.      Encounter Diagnosis   Name Primary?     Hyperlipidemia with target LDL less than 160 Yes       CURRENT MEDICATIONS:  Current Outpatient Prescriptions   Medication Sig Dispense Refill     cholecalciferol (VITAMIN D3) 93759 UNITS capsule SIG: TAKE 1 CAPSULE 1ST AND 15TH OF EACH MONTH 40 capsule 0     nicotine polacrilex (NICORETTE) 2 MG gum 1 piece of gum every 1-2 hours as needed for smoking cessation (Patient taking differently: as needed 1 piece of gum every 1-2 hours as needed for smoking cessation) 100 each 1     order for DME Trilok ankle brace 1 Device 0     Cyanocobalamin (B-12 SUPER STRENGTH) 5000 MCG/ML LIQD Place 1 mL under the tongue every morning FOR VITAMIN B12 SUPPLEMENTATION, PLEASE PLACE UNDER THE TONGUE (Patient taking differently: Place 1 mL under the tongue every morning FOR VITAMIN B12 SUPPLEMENTATION, PLEASE PLACE UNDER THE TONGUE PRN) 2 Bottle PRN     omeprazole (PRILOSEC) 20 MG CR capsule Take 1 capsule (20 mg) by mouth every morning (before breakfast) INDICATION: TO CONTROL REFLUX SYMPTOMS (Patient taking differently: Take 20 mg by mouth daily as needed INDICATION: TO CONTROL REFLUX SYMPTOMS) 90 capsule 3     vitamin C-electrolytes (EMERGEN-C) 1000mg vitamin C super orange drink mix Mix 1 packet in 4-6oz water and take once daily INDICATION: VITAMIN C SUPPLEMENTION 90 packet PRN     calcium-vitamin D-vitamin K (VIACTIV) 500-500-40 MG-UNT-MCG CHEW Take 1 tablet by mouth 2 times daily (with meals) INDICATION: FOR BONE AND BREAST HEALTH (Patient taking differently: Take 1 tablet by mouth daily as needed INDICATION: FOR BONE AND BREAST HEALTH) 100 tablet PRN     aspirin 81 MG chewable tablet Take 81 mg by mouth daily         ALLERGIES     Allergies  "  Allergen Reactions     Seasonal Allergies        PAST MEDICAL HISTORY:  Past Medical History:   Diagnosis Date     Anemia      Depressive disorder      Generalized anxiety disorder     Follows with psychiatrist       PAST SURGICAL HISTORY:  Past Surgical History:   Procedure Laterality Date     TONSILLECTOMY  1975       FAMILY HISTORY:  Family History   Problem Relation Age of Onset     Family History Negative Mother      Family History Negative Father      DIABETES Maternal Grandmother      Asthma Brother        SOCIAL HISTORY:  Social History     Social History     Marital status: Single     Spouse name: N/A     Number of children: 3     Years of education: N/A     Occupational History     [employer]     lactation resource spec     Social History Main Topics     Smoking status: Current Some Day Smoker     Years: 10.00     Types: Cigarettes     Smokeless tobacco: Never Used      Comment: 1 pack per month     Alcohol use No     Drug use: No     Sexual activity: Not Currently     Other Topics Concern     None     Social History Narrative       Review of Systems:  Skin:  Negative       Eyes:  Positive for glasses;contacts    ENT:  Negative      Respiratory:  Positive for dyspnea on exertion;shortness of breath     Cardiovascular:  Negative;edema;cyanosis Positive for;syncope or near-syncope;palpitations;lightheadedness;dizziness;fatigue chest pain, stress related  Gastroenterology: Positive for heartburn    Genitourinary:  Negative      Musculoskeletal:  Positive for back pain    Neurologic:  Negative      Psychiatric:  Positive for sleep disturbances;excessive stress    Heme/Lymph/Imm:  Positive for allergies    Endocrine:  Negative        Physical Exam:  Vitals: BP 96/68 (Patient Position: Standing)  Pulse 77  Ht 1.676 m (5' 6\")  Wt 116.2 kg (256 lb 1.6 oz)  LMP 09/18/2017  BMI 41.34 kg/m2    Constitutional:    morbidly obese      Skin:  warm and dry to the touch        Head:  " normocephalic        Eyes:  pupils equal and round        ENT:  no pallor or cyanosis        Neck:  carotid pulses are full and equal bilaterally        Chest:  clear to auscultation;normal symmetry          Cardiac: regular rhythm;no murmurs, gallops or rubs detected                  Abdomen:           Vascular: pulses full and equal                                        Extremities and Back:  no edema;no deformities, clubbing, cyanosis, erythema observed              Neurological:  affect appropriate, oriented to time, person and place;no gross motor deficits                Johnson Haile MD  [address] [place] [address]

## 2017-10-27 NOTE — PROGRESS NOTES
HISTORY OF PRESENT ILLNESS:  Ms. Hart is a pleasant 47-year-old female who comes into clinic today after being evaluated in the emergency room for an episode of syncope.  Ms. Hart he tells me she has had episodes of near-syncope for many years now.  She does have triggers including blood draws, needles, watching her children at the dentist or getting a shot can trigger near fainting episodes.      With this particular episode, she did not notice a trigger.  She had not been drinking anything.  She was walking out of a building and suddenly became hot, sweaty and nauseous with tunneling of vision.  She was able to alert her friends that she was feeling faint and they were able sit her down before she actually did faint.  She describes a sensation of fatigue afterwards that can last several hours.  She denies ever having injured herself with these falls and according to the ER record, she did not have any seizure-like activity or loss of bowel or bladder.        She did have an echocardiogram done on the 10/24, which I reviewed.  This was essentially normal.  I repeated an EKG today, which I have reviewed.  This also appears very normal.        I reviewed her medication list; she takes a baby aspirin and some multivitamins as well as acid suppressant agent.  She is on Nicorette gum trying to quit smoking currently.  She does not use alcohol.  She drinks about 1-3 caffeinated beverages per day.      PHYSICAL EXAMINATION:  Today, she was orthostatic with a blood pressure drop greater than 10 with standing after 1 minute.  Her sitting blood pressure was 96/68, pulse is 77, weight 256 with a body mass index of 41.  Carotid upstrokes seem brisk without bruit.  Cardiovascular tones are regular.  I do not appreciate a murmur, gallop or rub.  Lungs are clear posteriorly.  She has palpable pulses in the distal extremities without peripheral edema.      IMPRESSION:   In summary, Ms. Hart is a very pleasant  47-year-old female with a reported history of near-syncopal episodes with a recent syncopal episode that she did not note a trigger.  Her history and recent episode are consistent with vasovagal syncope and she does demonstrate mild orthostasis on exam today.  EKG and echocardiogram are normal.        I have recommended at this time lifestyle modifications including liberal fluid and salt intake as well as compression socks.  She should avoid alcohol, reduce her caffeine intake and avoid triggers that she has known for many years that can precipitate her symptoms.  I did discuss with her if all of these changes are ineffective, there are medications we can use, such as midodrine, to help raise her blood pressure to avoid near-syncopal episodes in the future.  She is willing to try the lifestyle modifications first and I would be happy to see her back at any time should she fail the lifestyle modifications or have increased frequency of symptoms.        Please feel free to contact me with any questions you have in regards to her care.  Thank you for allowing me to participate in the care of this very nice patient.      cc:   Johnson Haile MD    [address] [address]         HALIMA DAVIES DO             D: 10/27/2017 12:00   T: 10/27/2017 12:47   MT: GLORY      Name:     DIANA ROSS   MRN:      [id number]        Account:      [id number]   :      1970           Service Date: 10/27/2017      Document: [id number]

## 2018-01-25 ENCOUNTER — OFFICE VISIT (OUTPATIENT)
Dept: INTERNAL MEDICINE | Facility: CLINIC | Age: 48
End: 2018-01-25
Payer: COMMERCIAL

## 2018-01-25 VITALS
HEIGHT: 66 IN | SYSTOLIC BLOOD PRESSURE: 120 MMHG | HEART RATE: 69 BPM | DIASTOLIC BLOOD PRESSURE: 70 MMHG | OXYGEN SATURATION: 96 % | BODY MASS INDEX: 41.91 KG/M2 | WEIGHT: 260.8 LBS | TEMPERATURE: 97.9 F

## 2018-01-25 DIAGNOSIS — Z76.89 ENCOUNTER TO ESTABLISH CARE: Primary | ICD-10-CM

## 2018-01-25 DIAGNOSIS — Z12.39 SCREENING FOR BREAST CANCER: ICD-10-CM

## 2018-01-25 DIAGNOSIS — R53.82 CHRONIC FATIGUE: ICD-10-CM

## 2018-01-25 PROBLEM — G47.19 DAYTIME HYPERSOMNOLENCE: Status: RESOLVED | Noted: 2017-01-24 | Resolved: 2018-01-25

## 2018-01-25 PROCEDURE — 99214 OFFICE O/P EST MOD 30 MIN: CPT | Performed by: INTERNAL MEDICINE

## 2018-01-25 NOTE — MR AVS SNAPSHOT
After Visit Summary   1/25/2018    Dalia Hart    MRN: [id number]           Patient Information     Date Of Birth          1970        Visit Information        Provider Department      1/25/2018 11:00 AM Arely Ryder MD [facility]        Today's Diagnoses     Screening for breast cancer    -  1      Care Instructions    Please schedule mammogram on the way out.    ---    Recommend cardiovascularly vigorous exercise (swimming, running, spinning, intervals, elliptical) 5 days/week, 30 minutes a day.     ---    New medication list on back sheet.           Follow-ups after your visit        Future tests that were ordered for you today     Open Future Orders        Priority Expected Expires Ordered    MA Screening Digital Bilateral Routine  1/26/2019 1/25/2018            Who to contact     If you have questions or need follow up information about today's clinic visit or your schedule please contact [facility] directly at [phone].  Normal or non-critical lab and imaging results will be communicated to you by QuoVadishart, letter or phone within 4 business days after the clinic has received the results. If you do not hear from us within 7 days, please contact the clinic through Stepssst or phone. If you have a critical or abnormal lab result, we will notify you by phone as soon as possible.  Submit refill requests through VitalsGuard or call your pharmacy and they will forward the refill request to us. Please allow 3 business days for your refill to be completed.          Additional Information About Your Visit        MyChart Information     VitalsGuard gives you secure access to your electronic health record. If you see a primary care provider, you can also send messages to your care team and make appointments. If you have questions, please call your primary care clinic.  If you do not have a primary care provider, please call [phone] and they  "will assist you.        Care EveryWhere ID     This is your Care EveryWhere ID. This could be used by other organizations to access your [facility] medical records  [id number]        Your Vitals Were     Pulse Temperature Height Last Period Pulse Oximetry BMI (Body Mass Index)    69 97.9  F (36.6  C) (Oral) 5' 6\" (1.676 m) 01/22/2018 (Exact Date) 96% 42.09 kg/m2       Blood Pressure from Last 3 Encounters:   01/25/18 120/70   10/27/17 96/68   10/16/17 90/58    Weight from Last 3 Encounters:   01/25/18 260 lb 12.8 oz (118.3 kg)   10/27/17 256 lb 1.6 oz (116.2 kg)   10/16/17 253 lb 1.6 oz (114.8 kg)                 Today's Medication Changes          These changes are accurate as of 1/25/18 11:33 AM.  If you have any questions, ask your nurse or doctor.               These medicines have changed or have updated prescriptions.        Dose/Directions    nicotine polacrilex 2 MG gum   Commonly known as:  NICORETTE   This may have changed:    - when to take this  - reasons to take this  - additional instructions   Used for:  Encounter for smoking cessation counseling        1 piece of gum every 1-2 hours as needed for smoking cessation   Quantity:  100 each   Refills:  1                Primary Care Provider Office Phone # Fax #    Arely Ryder [phone]       [address] [facility] [address]        Equal Access to Services     BARBARA LEMON AH: Hadii francisco romoo Soevita, waaxda luqadaha, qaybta kaalmada somyada, merary maddox adefelix arreguin. So [facility] [phone].    ATENCIÓN: Si habla español, tiene a carrasco disposición servicios gratuitos de asistencia lingüística. Llame al [phone].    We comply with applicable federal civil rights laws and [place] laws. We do not discriminate on the basis of race, color, national origin, age, disability, sex, sexual orientation, or gender identity.            Thank you!     Thank you for choosing [facility]  for your " care. Our goal is always to provide you with excellent care. Hearing back from our patients is one way we can continue to improve our services. Please take a few minutes to complete the written survey that you may receive in the mail after your visit with us. Thank you!             Your Updated Medication List - Protect others around you: Learn how to safely use, store and throw away your medicines at www.disposemymeds.org.          This list is accurate as of 1/25/18 11:33 AM.  Always use your most recent med list.                   Brand Name Dispense Instructions for use Diagnosis    aspirin 81 MG chewable tablet      Take 81 mg by mouth daily        nicotine polacrilex 2 MG gum    NICORETTE    100 each    1 piece of gum every 1-2 hours as needed for smoking cessation    Encounter for smoking cessation counseling

## 2018-01-25 NOTE — PATIENT INSTRUCTIONS
Please schedule mammogram on the way out.    ---    Recommend cardiovascularly vigorous exercise (swimming, running, spinning, intervals, elliptical) 5 days/week, 30 minutes a day.     ---    New medication list on back sheet.

## 2018-01-25 NOTE — PROGRESS NOTES
SUBJECTIVE:                                                      HPI: Dalia Hart is a pleasant 47 year old female who presents to establish care:    Patient complains of chronic fatigue:  - had extensive workup performed by prior PCP  - negative laboratory workup  - negative sleep study  - etiology of chronic fatigue not identified    PMH, PSH, FH, SH, medications, allergies, immunizations, and preventative health measures reviewed.     Past Medical History:   Diagnosis Date     Morbid obesity (H)      Re: MO - patient aware - admits to not eating well and not exercising at all.    Past Surgical History:   Procedure Laterality Date     LASER ABLATION VEIN VARICOSE      right leg     TONSILLECTOMY & ADENOIDECTOMY  1975     Family History   Problem Relation Age of Onset     Type 2 Diabetes Maternal Grandmother      Myocardial Infarction Maternal Grandmother      later in life     Type 2 Diabetes Maternal Aunt      Coronary Artery Disease Maternal Aunt      s/p PCI later in life     CEREBROVASCULAR DISEASE No family hx of      Coronary Artery Disease Early Onset No family hx of      Breast Cancer No family hx of      Colon Cancer No family hx of      Ovarian Cancer No family hx of      Occupational History     [employer]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    3 children (19, 14, and 12 as of 2018).    No formal exercise.      Allergies   Allergen Reactions     Bee Venom Anaphylaxis     Other reaction(s): Other, see comments  Unsure, swelling,      Seasonal Allergies      Current Outpatient Prescriptions   Medication Sig     nicotine polacrilex (NICORETTE) 2 MG gum 1 piece of gum every 1-2 hours as needed for smoking  "cessation (Patient taking differently: as needed 1 piece of gum every 1-2 hours as needed for smoking cessation)     aspirin 81 MG chewable tablet Take 81 mg by mouth daily     Immunization History   Administered Date(s) Administered     Influenza (IIV3) PF 11/07/2007, 11/03/2008, 11/23/2016     Influenza Intranasal Vaccine 10/27/2011     Influenza Vaccine IM 3yrs+ 4 Valent IIV4 11/13/2015, 10/20/2017     TD (ADULT, 7+) 01/01/2008     Tdap (Adacel,Boostrix) 03/26/2015     OBJECTIVE:                                                      /70 (BP Location: Left arm, Patient Position: Chair, Cuff Size: Adult Large)  Pulse 69  Temp 97.9  F (36.6  C) (Oral)  Ht 5' 6\" (1.676 m)  Wt 260 lb 12.8 oz (118.3 kg)  LMP 01/22/2018 (Exact Date)  SpO2 96%  BMI 42.09 kg/m2  Constitutional: well-appearing  Psych: normal judgment and insight; normal mood and affect; recent and remote memory intact; oriented to time, place, and person    PREVENTATIVE HEALTH                                                      Blood pressure: within normal limits   Breast CA screening: DUE  Cervical CA screening: last pap performed 9/16 ([facility]); report reviewed and was normal; repeat due in 5 years  Colon CA screening: not medically indicated at this time   Lung CA screening: not medically indicated at this time    Dexa: not medically indicated at this time   Screening HCV: n/a   Screening cholesterol: up to date   Screening diabetes: up to date   STD testing: no risk factors present  Depression screening: PHQ-2 assessment completed and reviewed - no intervention indicated at this time  Alcohol misuse screening: alcohol use reviewed - no intervention indicated at this time  Immunizations: reviewed; up to date     ASSESSMENT/PLAN:                                                       (Z76.89) Encounter to establish care  (primary encounter diagnosis)  Comment: PMH, PSH, FH, SH, medications, allergies, immunizations, and preventative " health measures reviewed.   Plan: see below for plans.    (Z12.31) Screening for breast cancer  Plan: mammogram ordered - patient to schedule.     (R53.82) Chronic fatigue  Comment:   - extensive and unrevealing work-up to date.   - suspect may be due to lack of exercise and morbid obesity.  Plan:    - patient encouraged to engage in regular cardiovascularly vigorous exercise.   - patient encouraged to lose weight via diet and exercise.     The instructions on the AVS were discussed and explained to the patient. Patient expressed understanding of instructions.    A total of 25 minutes were spent face-to-face with this patient during this encounter and over half of that time was spent on counseling and coordination of care re: above diagnoses and plans of care.     (Chart documentation was completed, in part, with LocalSort voice-recognition software. Even though reviewed, some grammatical, spelling, and word errors may remain.)    Arely Ryder MD   [address] [address]  T: [phone], F: [phone]

## 2018-01-25 NOTE — Clinical Note
last pap performed 9/16 ([facility]); report reviewed and was normal; repeat due in 5 years

## 2018-03-12 ENCOUNTER — OFFICE VISIT (OUTPATIENT)
Dept: PODIATRY | Facility: CLINIC | Age: 48
End: 2018-03-12
Payer: COMMERCIAL

## 2018-03-12 VITALS
DIASTOLIC BLOOD PRESSURE: 82 MMHG | BODY MASS INDEX: 41.79 KG/M2 | RESPIRATION RATE: 12 BRPM | WEIGHT: 258.9 LBS | SYSTOLIC BLOOD PRESSURE: 128 MMHG

## 2018-03-12 DIAGNOSIS — M76.71 PERONEAL TENDINITIS OF RIGHT LOWER EXTREMITY: Primary | ICD-10-CM

## 2018-03-12 DIAGNOSIS — M79.671 PAIN OF BOTH HEELS: ICD-10-CM

## 2018-03-12 DIAGNOSIS — M79.672 PAIN OF BOTH HEELS: ICD-10-CM

## 2018-03-12 PROCEDURE — 99213 OFFICE O/P EST LOW 20 MIN: CPT | Performed by: PODIATRIST

## 2018-03-12 NOTE — NURSING NOTE
"Chief Complaint   Patient presents with     Foot Problems     pain on lateral edge of right foot x 1 month       Initial /82  Resp 12  Wt 258 lb 14.4 oz (117.4 kg)  BMI 41.79 kg/m2 Estimated body mass index is 41.79 kg/(m^2) as calculated from the following:    Height as of 1/25/18: 5' 6\" (1.676 m).    Weight as of this encounter: 258 lb 14.4 oz (117.4 kg).  Medication Reconciliation: complete    "

## 2018-03-12 NOTE — PROGRESS NOTES
ASSESSMENT/PLAN:  Encounter Diagnoses   Name Primary?     Peroneal tendinitis of right lower extremity Yes     Pain of both heels      I discussed the anatomy and function of the peroneal tendons.    Pt is referred to the [facility] for prescription orthoses.    We discussed quality, supportive athletic shoes.  These should be worn at home as well.     Relative rest  Trilok brace with foot strap lateral - she has one.  Ice    If her tendon pain persists or worsens, I will consider PT.       Weight management plan: Patient was referred to their PCP to discuss a diet and exercise plan.      Zackary Rodgers, MARIANN, FACFAS, MS    [facility]le [facility]      ____________________________________________________________________    HPI:         Chief Complaint: pain in outer edge of the right foot  Onset of problem: 30 days  Pain/ discomfort is described as:  Throbbing, occasional shooting  Ratin/10   Frequency:  daily    The pain is made worse with weight bearing  Previous treatment: none    She reports that her heel pain is under control.  I treated her for this in October.    *  Past Medical History:   Diagnosis Date     Morbid obesity (H)    *  *  Past Surgical History:   Procedure Laterality Date     LASER ABLATION VEIN VARICOSE      right leg     TONSILLECTOMY & ADENOIDECTOMY     *  *  Current Outpatient Prescriptions   Medication Sig Dispense Refill     nicotine polacrilex (NICORETTE) 2 MG gum 1 piece of gum every 1-2 hours as needed for smoking cessation (Patient taking differently: as needed 1 piece of gum every 1-2 hours as needed for smoking cessation) 100 each 1     aspirin 81 MG chewable tablet Take 81 mg by mouth daily         ROS:    A 10-point review of systems was performed.  It is positive for that noted in the HPI and as seen below.  All other systems found to be negative.     Numbness in feet?  no   Calf pain with walking? no  Recent  foot/ankle injury? no  Weight change  over past 12 months? no  Self perception as overweight? yes  Recent flu-like symptoms? no  Joint pain other than feet ? legs    EXAM:    Vitals: /82  Resp 12  Wt 258 lb 14.4 oz (117.4 kg)  BMI 41.79 kg/m2  BMI: Body mass index is 41.79 kg/(m^2).  Height: Data Unavailable    Constitutional/ general:  Pt is in no apparent distress, appears well-nourished.  Cooperative with history and physical exam.     Vascular:  Pedal pulses are palpable bilaterally for both the DP and PT arteries.  CFT < 3 sec.  No edema.  Pedal hair growth noted.     Neuro:  Alert and oriented x 3. Coordinated gait.  Light touch sensation is intact to the L4, L5, S1 distributions. No obvious deficits.  No evidence of neurological-based weakness, spasticity, or contracture in the lower extremities.     Derm: Normal texture and turgor.  No erythema, ecchymosis, or cyanosis.  No open lesions.     Musculoskeletal:    Lower extremity muscle strength is normal.  Patient is ambulatory without an assistive device or brace .  No gross deformities.  Pain on palpation: over the peroneus brevis, right, just proximal to the insertion.  No pain with eversion of the foot against resistance or with plantar flexion of the first metatarsal against resistance.  Mild pronation with WB.  No pain with palpation over th eright 5th metatarsal.      Zackary Rodgers DPM, FACFAS, MS [facility] & [facility]le Surgery

## 2018-03-12 NOTE — MR AVS SNAPSHOT
After Visit Summary   3/12/2018    Dalia Hart    MRN: [id number]           Patient Information     Date Of Birth          1970        Visit Information        Provider Department      3/12/2018 9:30 AM Zackary Rodgers DPM [facility]        Today's Diagnoses     Peroneal tendinitis of right lower extremity    -  1    Pain of both heels          Care Instructions    [facility] LOCATIONS  [facility]  [address]  [place] [address]  Phone: [phone]  Fax: [phone] [facility]  [address]  [place] [address]  Phone: [phone]   Fax: [phone]   [facility]  [address]  [place] [address]  Phone: [phone]  Fax: [phone] [facility]  [address]  [place] [address]  Phone: [phone]   Fax: [phone]   [facility]   [address]  [place] [address]  Phone: [phone]   Fax: [phone] * Please call any location listed to make an appointment for a casting/fitting. Your referral was sent to their central office and they will all have the order on file.     [facility] LOCATIONS  [place]  [address]  [phone]   [address]  [phone] [place]  [address] [facility]  [phone]   [place]  [address]  [phone]   [place]  [address]  [phone] [place] [address]  [phone]   [place]  [address]  [phone]   [place]  [address] [place] [address]  [phone] [place]  [address]  [phone]         TENDONITIS   Tendons are the strong fibrous portions ofmuscles that attach to bones and allow the muscle to move a joint when it contracts. Tendons are very strong because they have a lot of force exerted on them. Sometimes tendons can become painful  because they have suffered an acute injury, in which too much force was exerted at one time, or an overuse injury, in which a normal force was exerted too frequently or over a prolonged period of time. As a result, there is damage to the tendon and its surrounding soft tissue structures and they become inflammed. Because tendons do not have a great blood supply, they do not heal rapidly and the inflammation can become chronic.   Conservative treatment for tendinitis involves rest and anti-inflammatory measures. Ice is applied 15 minutes 2-3 times daily. Anti-inflammatory medications called NSAIDs (ibuprofen, example) can be taken provided they are used with caution, as they can lead to internal bleeding and increase the risk ofstroke and heart attack. Sometimes topical nitroglycerin is prescribed to help with pain. Often your doctor will use a special shoe or removable walking cast to immobilize the tendon, allowing it to heal without further damage from use. These devices are very useful in helping tendons heal, but they may slow you down or make you feel like your hip, knee, or back are out ofalignment. This is temporary and should go away once you are out ofthe immobilization. You should not use a walking cast when showering or driving. Another option is Platelet Rich Plasma injections. (Normally done with a Sports and Orthorapedic doctor.   If conservative measures fail, your physician may need to surgically repair the tendon by removing any chronic inflammatory tissue and sewing it back together. Sometimes it is sewn to an adjacent tendon with similar function for support and sometimes it is lengthened. . Sometimes the bones around the tendon need to be realigned or reshaped to better support the tendon or prevent further damage. Your foot and ankle surgeon will discuss the specifics of your surgery with you, should you need it.    Towel stretch: Sit on a hard surface with your injured leg stretched out in  front of you. Loop a towel around your toes and the ball of your foot and pull the towel toward your body keeping your leg straight. Hold this position for 15 to 30 seconds and then relax. Repeat 3 times. Then push the towel away with the ball of your foot. Repeat 3 times.  When you don't feel much of a stretch using the towel, you can start the standing calf stretch and the following exercises.  Standing calf stretch: Stand facing a wall with your hands on the wall at about eye level. Keep your injured leg back with your heel on the floor. Keep the other leg forward with the knee bent. Turn your back foot slightly inward (as if you were pigeon-toed). Slowly lean into the wall until you feel a stretch in the back of your calf. Hold the stretch for 15 to 30 seconds. Return to the starting position. Repeat 3 times. Do this exercise several times each day.   Standing soleus stretch: Stand facing a wall with your hands on the wall at about chest height. Keep your injured leg back with your heel on the floor. Keep the other leg forward with the knee bent. Turn your back foot slightly inward (as if you were pigeon-toed). Bend your back knee slightly and gently lean into the wall until you feel a stretch in the lower calf of your injured leg. Hold the stretch for 15 to 30 seconds. Return to the starting position. Repeat 3 times.   Achilles stretch: Stand with the ball of one foot on a stair. Reach for the step below with your heel until you feel a stretch in the arch of your foot. Hold this position for 15 to 30 seconds and then relax. Repeat 3 times.   Heel raise: Balance yourself while standing behind a chair or counter. Using the chair or counter as a support to help you, raise your body up onto your toes and hold for 5 seconds. Then slowly lower yourself down without holding onto the support. (It's OK to keep holding onto the support if you need to.) When this exercise becomes less painful, try lowering yourself down  on the injured leg only. Repeat 15 times. Do 2 sets of 15. Rest 30 seconds between sets.   Step-up: Stand with the foot of your injured leg on a support 3 to 5 inches high (like a small step or block of wood). Keep your other foot flat on the floor. Shift your weight onto the injured leg on the support. Straighten your injured leg as the other leg comes off the floor. Return to the starting position by bending your injured leg and slowly lowering your uninjured leg back to the floor. Do 2 sets of 15.   Resisted ankle eversion: Sit with both legs stretched out in front of you, with your feet about a shoulder's width apart. Tie a loop in one end of elastic tubing. Put the foot of your injured leg through the loop so that the tubing goes around the arch of that foot and wraps around the outside of the other foot. Hold onto the other end of the tubing with your hand to provide tension. Turn the foot of your injured leg up and out. Make sure you keep your other foot still so that it will allow the tubing to stretch as you move the foot of your injured leg. Return to the starting position. Do 2 sets of 15.   Balance and reach exercises: Stand next to a chair with your injured leg farther from the chair. The chair will provide support if you need it. Stand on the foot of your injured leg and bend your knee slightly. Try to raise the arch of this foot while keeping your big toe on the floor. Keep your foot in this position. With the hand that is farther away from the chair, reach forward in front of you by bending at the waist. Avoid bending your knee any more as you do this. Repeat this 10 times. To make the exercise more challenging, reach farther in front of you. Do 2 sets of 10.  the same position as above. While keeping your arch height, reach the hand that is farther away from the chair across your body toward the chair. The farther you reach, the more challenging the exercise. Do 2 sets of 10.     Resisted  ankle eversion: Sit with both legs stretched out in front of you, with your feet about a shoulder's width apart. Tie a loop in one end of elastic tubing. Put the foot of your injured leg through the loop so that the tubing goes around the arch of that foot and wraps around the outside of the other foot. Hold onto the other end of the tubing with your hand to provide tension. Turn the foot of your injured leg up and out. Make sure you keep your other foot still so that it will allow the tubing to stretch as you move the foot of your injured leg. Return to the starting position. Do 2 sets of 15.   If you have access to a wobble board, do the following exercises:  Wobble board exercises:   Stand on a wobble board with your feet shoulder width apart. Rock the board forwards and backwards 30 times, then side to side 30 times. Hold on to a chair if you need support.   Rotate the wobble board around so that the edge of the board is in contact with the floor at all times. Do this 30 times in a clockwise and then a counterclockwise direction.   Balance on the wobble board for as long as you can without letting the edges touch the floor. Try to do this for 2 minutes without touching the floor.   Rotate the wobble board in clockwise and counterclockwise circles, but do not let the edge of the board touch the floor.   When you have mastered exercises A through D, try repeating them while standing on just your injured leg.   After you are able to do these exercises on one leg, try to do them with your eyes closed. Make sure you have something nearby to support you in case you lose your balance.            Follow-ups after your visit        Additional Services     ORTHOTICS REFERRAL       **This referral order prints off in the [facility]  (Orthotics & Prosthetics) Central Scheduling Office**    The [facility] Central Scheduling Staff will contact the patient to schedule appointments.     Central Scheduling  Contact Information: [phone] ([place])    Orthotics: Foot Orthotics    Please be aware that coverage of these services is subject to the terms and limitations of your health insurance plan.  Call member services at your health plan with any benefit or coverage questions.      Please bring the following to your appointment:    >>   Any x-rays, CTs or MRIs which have been performed.  Contact the facility where they were done to arrange for  prior to your scheduled appointment.    >>   List of current medications   >>   This referral request   >>   Any documents/labs given to you for this referral                  Who to contact     If you have questions or need follow up information about today's clinic visit or your schedule please contact [facility] directly at [phone].  Normal or non-critical lab and imaging results will be communicated to you by MyChart, letter or phone within 4 business days after the clinic has received the results. If you do not hear from us within 7 days, please contact the clinic through MyChart or phone. If you have a critical or abnormal lab result, we will notify you by phone as soon as possible.  Submit refill requests through [employer] or call your pharmacy and they will forward the refill request to us. Please allow 3 business days for your refill to be completed.          Additional Information About Your Visit        [employer] Information     [employer] gives you secure access to your electronic health record. If you see a primary care provider, you can also send messages to your care team and make appointments. If you have questions, please call your primary care clinic.  If you do not have a primary care provider, please call [phone] and they will assist you.        Care EveryWhere ID     This is your Care EveryWhere ID. This could be used by other organizations to access your [facility] medical records  [id number]        Your Vitals Were      Respirations BMI (Body Mass Index)                12 41.79 kg/m2           Blood Pressure from Last 3 Encounters:   03/12/18 128/82   01/25/18 120/70   10/27/17 96/68    Weight from Last 3 Encounters:   03/12/18 258 lb 14.4 oz (117.4 kg)   01/25/18 260 lb 12.8 oz (118.3 kg)   10/27/17 256 lb 1.6 oz (116.2 kg)              We Performed the Following     ORTHOTICS REFERRAL          Today's Medication Changes          These changes are accurate as of 3/12/18 10:08 AM.  If you have any questions, ask your nurse or doctor.               These medicines have changed or have updated prescriptions.        Dose/Directions    nicotine polacrilex 2 MG gum   Commonly known as:  NICORETTE   This may have changed:    - when to take this  - reasons to take this  - additional instructions   Used for:  Encounter for smoking cessation counseling        1 piece of gum every 1-2 hours as needed for smoking cessation   Quantity:  100 each   Refills:  1                Primary Care Provider Office Phone # Fax #    Arely Ryder [phone]       [address] [place] [address]        Equal Access to Services     [facility]: Hadii francisco szymanski Soevita, waaxda luyane, qaybta kaalmada omkar, merary tan . So [facility] [phone].    ATENCIÓN: Si habla español, tiene a carrasco disposición servicios gratuitos de asistencia lingüística. Llame al [phone].    We comply with applicable federal civil rights laws and [place] laws. We do not discriminate on the basis of race, color, national origin, age, disability, sex, sexual orientation, or gender identity.            Thank you!     Thank you for choosing [facility]  for your care. Our goal is always to provide you with excellent care. Hearing back from our patients is one way we can continue to improve our services. Please take a few minutes to complete the written survey that you may receive in the mail  after your visit with us. Thank you!             Your Updated Medication List - Protect others around you: Learn how to safely use, store and throw away your medicines at www.disposemymeds.org.          This list is accurate as of 3/12/18 10:08 AM.  Always use your most recent med list.                   Brand Name Dispense Instructions for use Diagnosis    aspirin 81 MG chewable tablet      Take 81 mg by mouth daily        nicotine polacrilex 2 MG gum    NICORETTE    100 each    1 piece of gum every 1-2 hours as needed for smoking cessation    Encounter for smoking cessation counseling

## 2018-03-12 NOTE — PATIENT INSTRUCTIONS
[facility] LOCATIONS  [facility]  [address]  [place] [address]  Phone: [phone]  Fax: [phone] [facility]  [address]  [place] [address]  Phone: [phone]   Fax: [phone]   [facility] [place]  [address]  [place] [address]  Phone: [phone]  Fax: [phone] [facility]  [address]  [place] [address]  Phone: [phone]   Fax: [phone]   [facility]   [address]  [place] [address]  Phone: [phone]   Fax: [phone] * Please call any location listed to make an appointment for a casting/fitting. Your referral was sent to their central office and they will all have the order on file.     [facility] LOCATIONS  [place]  [address] [facility]  [phone]   [address]  [phone] [place]  [address] [facility]  [phone]   [place]  [address] [place] [address] [address]  [phone]   [place]  [address]  [phone] [place] [address]  [phone]   [place]  [address]  [phone]   [place]  [address]  [phone] [place]  [address]  [phone]         TENDONITIS   Tendons are the strong fibrous portions ofmuscles that attach to bones and allow the muscle to move a joint when it contracts. Tendons are very strong because they have a lot of force exerted on them. Sometimes tendons can become painful because they have suffered an acute injury, in which too much force was exerted at one time, or an overuse injury, in which a normal force was exerted too frequently or over a prolonged period of time. As a result, there is damage to the tendon and its surrounding soft tissue structures and they become inflammed. Because tendons do not have a great blood supply, they do not heal rapidly and the inflammation can become  chronic.   Conservative treatment for tendinitis involves rest and anti-inflammatory measures. Ice is applied 15 minutes 2-3 times daily. Anti-inflammatory medications called NSAIDs (ibuprofen, example) can be taken provided they are used with caution, as they can lead to internal bleeding and increase the risk ofstroke and heart attack. Sometimes topical nitroglycerin is prescribed to help with pain. Often your doctor will use a special shoe or removable walking cast to immobilize the tendon, allowing it to heal without further damage from use. These devices are very useful in helping tendons heal, but they may slow you down or make you feel like your hip, knee, or back are out ofalignment. This is temporary and should go away once you are out ofthe immobilization. You should not use a walking cast when showering or driving. Another option is Platelet Rich Plasma injections. (Normally done with a Sports and Orthorapedic doctor.   If conservative measures fail, your physician may need to surgically repair the tendon by removing any chronic inflammatory tissue and sewing it back together. Sometimes it is sewn to an adjacent tendon with similar function for support and sometimes it is lengthened. . Sometimes the bones around the tendon need to be realigned or reshaped to better support the tendon or prevent further damage. Your foot and ankle surgeon will discuss the specifics of your surgery with you, should you need it.    Towel stretch: Sit on a hard surface with your injured leg stretched out in front of you. Loop a towel around your toes and the ball of your foot and pull the towel toward your body keeping your leg straight. Hold this position for 15 to 30 seconds and then relax. Repeat 3 times. Then push the towel away with the ball of your foot. Repeat 3 times.  When you don't feel much of a stretch using the towel, you can start the standing calf stretch and the following exercises.  Standing calf stretch:  Stand facing a wall with your hands on the wall at about eye level. Keep your injured leg back with your heel on the floor. Keep the other leg forward with the knee bent. Turn your back foot slightly inward (as if you were pigeon-toed). Slowly lean into the wall until you feel a stretch in the back of your calf. Hold the stretch for 15 to 30 seconds. Return to the starting position. Repeat 3 times. Do this exercise several times each day.   Standing soleus stretch: Stand facing a wall with your hands on the wall at about chest height. Keep your injured leg back with your heel on the floor. Keep the other leg forward with the knee bent. Turn your back foot slightly inward (as if you were pigeon-toed). Bend your back knee slightly and gently lean into the wall until you feel a stretch in the lower calf of your injured leg. Hold the stretch for 15 to 30 seconds. Return to the starting position. Repeat 3 times.   Achilles stretch: Stand with the ball of one foot on a stair. Reach for the step below with your heel until you feel a stretch in the arch of your foot. Hold this position for 15 to 30 seconds and then relax. Repeat 3 times.   Heel raise: Balance yourself while standing behind a chair or counter. Using the chair or counter as a support to help you, raise your body up onto your toes and hold for 5 seconds. Then slowly lower yourself down without holding onto the support. (It's OK to keep holding onto the support if you need to.) When this exercise becomes less painful, try lowering yourself down on the injured leg only. Repeat 15 times. Do 2 sets of 15. Rest 30 seconds between sets.   Step-up: Stand with the foot of your injured leg on a support 3 to 5 inches high (like a small step or block of wood). Keep your other foot flat on the floor. Shift your weight onto the injured leg on the support. Straighten your injured leg as the other leg comes off the floor. Return to the starting position by bending your  injured leg and slowly lowering your uninjured leg back to the floor. Do 2 sets of 15.   Resisted ankle eversion: Sit with both legs stretched out in front of you, with your feet about a shoulder's width apart. Tie a loop in one end of elastic tubing. Put the foot of your injured leg through the loop so that the tubing goes around the arch of that foot and wraps around the outside of the other foot. Hold onto the other end of the tubing with your hand to provide tension. Turn the foot of your injured leg up and out. Make sure you keep your other foot still so that it will allow the tubing to stretch as you move the foot of your injured leg. Return to the starting position. Do 2 sets of 15.   Balance and reach exercises: Stand next to a chair with your injured leg farther from the chair. The chair will provide support if you need it. Stand on the foot of your injured leg and bend your knee slightly. Try to raise the arch of this foot while keeping your big toe on the floor. Keep your foot in this position. With the hand that is farther away from the chair, reach forward in front of you by bending at the waist. Avoid bending your knee any more as you do this. Repeat this 10 times. To make the exercise more challenging, reach farther in front of you. Do 2 sets of 10.  the same position as above. While keeping your arch height, reach the hand that is farther away from the chair across your body toward the chair. The farther you reach, the more challenging the exercise. Do 2 sets of 10.     Resisted ankle eversion: Sit with both legs stretched out in front of you, with your feet about a shoulder's width apart. Tie a loop in one end of elastic tubing. Put the foot of your injured leg through the loop so that the tubing goes around the arch of that foot and wraps around the outside of the other foot. Hold onto the other end of the tubing with your hand to provide tension. Turn the foot of your injured leg up and  out. Make sure you keep your other foot still so that it will allow the tubing to stretch as you move the foot of your injured leg. Return to the starting position. Do 2 sets of 15.   If you have access to a wobble board, do the following exercises:  Wobble board exercises:   Stand on a wobble board with your feet shoulder width apart. Rock the board forwards and backwards 30 times, then side to side 30 times. Hold on to a chair if you need support.   Rotate the wobble board around so that the edge of the board is in contact with the floor at all times. Do this 30 times in a clockwise and then a counterclockwise direction.   Balance on the wobble board for as long as you can without letting the edges touch the floor. Try to do this for 2 minutes without touching the floor.   Rotate the wobble board in clockwise and counterclockwise circles, but do not let the edge of the board touch the floor.   When you have mastered exercises A through D, try repeating them while standing on just your injured leg.   After you are able to do these exercises on one leg, try to do them with your eyes closed. Make sure you have something nearby to support you in case you lose your balance.

## 2018-03-12 NOTE — LETTER
3/12/2018         RE: Dalia Hart  [address]  [facility] [address]        Dear Colleague,    Thank you for referring your patient, Dalia Hart, to the [facility]. Please see a copy of my visit note below.    ASSESSMENT/PLAN:  Encounter Diagnoses   Name Primary?     Peroneal tendinitis of right lower extremity Yes     Pain of both heels      I discussed the anatomy and function of the peroneal tendons.    Pt is referred to the [facility] for prescription orthoses.    We discussed quality, supportive athletic shoes.  These should be worn at home as well.     Relative rest  Trilok brace with foot strap lateral - she has one.  Ice    If her tendon pain persists or worsens, I will consider PT.       Weight management plan: Patient was referred to their PCP to discuss a diet and exercise plan.      Zackary Rodgers DPM, FACFAS, MS    [facility]      ____________________________________________________________________    HPI:         Chief Complaint: pain in outer edge of the right foot  Onset of problem: 30 days  Pain/ discomfort is described as:  Throbbing, occasional shooting  Ratin/10   Frequency:  daily    The pain is made worse with weight bearing  Previous treatment: none    She reports that her heel pain is under control.  I treated her for this in October.    *  Past Medical History:   Diagnosis Date     Morbid obesity (H)    *  *  Past Surgical History:   Procedure Laterality Date     LASER ABLATION VEIN VARICOSE      right leg     TONSILLECTOMY & ADENOIDECTOMY     *  *  Current Outpatient Prescriptions   Medication Sig Dispense Refill     nicotine polacrilex (NICORETTE) 2 MG gum 1 piece of gum every 1-2 hours as needed for smoking cessation (Patient taking differently: as needed 1 piece of gum every 1-2 hours as needed for smoking cessation) 100 each 1     aspirin 81 MG chewable tablet  Take 81 mg by mouth daily         ROS:    A 10-point review of systems was performed.  It is positive for that noted in the HPI and as seen below.  All other systems found to be negative.     Numbness in feet?  no   Calf pain with walking? no  Recent foot/ankle injury? no  Weight change  over past 12 months? no  Self perception as overweight? yes  Recent flu-like symptoms? no  Joint pain other than feet ? legs    EXAM:    Vitals: /82  Resp 12  Wt 258 lb 14.4 oz (117.4 kg)  BMI 41.79 kg/m2  BMI: Body mass index is 41.79 kg/(m^2).  Height: Data Unavailable    Constitutional/ general:  Pt is in no apparent distress, appears well-nourished.  Cooperative with history and physical exam.     Vascular:  Pedal pulses are palpable bilaterally for both the DP and PT arteries.  CFT < 3 sec.  No edema.  Pedal hair growth noted.     Neuro:  Alert and oriented x 3. Coordinated gait.  Light touch sensation is intact to the L4, L5, S1 distributions. No obvious deficits.  No evidence of neurological-based weakness, spasticity, or contracture in the lower extremities.     Derm: Normal texture and turgor.  No erythema, ecchymosis, or cyanosis.  No open lesions.     Musculoskeletal:    Lower extremity muscle strength is normal.  Patient is ambulatory without an assistive device or brace .  No gross deformities.  Pain on palpation: over the peroneus brevis, right, just proximal to the insertion.  No pain with eversion of the foot against resistance or with plantar flexion of the first metatarsal against resistance.  Mild pronation with WB.  No pain with palpation over th eright 5th metatarsal.      Zackary Rodgers DPM, FACFAS, MS    [facility]              Again, thank you for allowing me to participate in the care of your patient.        Sincerely,        Zackary Rodgers DPM

## 2018-06-14 ENCOUNTER — OFFICE VISIT (OUTPATIENT)
Dept: INTERNAL MEDICINE | Facility: CLINIC | Age: 48
End: 2018-06-14
Payer: COMMERCIAL

## 2018-06-14 VITALS
HEART RATE: 68 BPM | RESPIRATION RATE: 16 BRPM | BODY MASS INDEX: 41.11 KG/M2 | OXYGEN SATURATION: 98 % | DIASTOLIC BLOOD PRESSURE: 62 MMHG | WEIGHT: 255.8 LBS | HEIGHT: 66 IN | TEMPERATURE: 98.7 F | SYSTOLIC BLOOD PRESSURE: 100 MMHG

## 2018-06-14 DIAGNOSIS — E66.01 MORBID OBESITY (H): ICD-10-CM

## 2018-06-14 DIAGNOSIS — H92.09 OTALGIA, UNSPECIFIED LATERALITY: Primary | ICD-10-CM

## 2018-06-14 DIAGNOSIS — Z72.0 TOBACCO ABUSE: ICD-10-CM

## 2018-06-14 PROCEDURE — 99213 OFFICE O/P EST LOW 20 MIN: CPT | Performed by: INTERNAL MEDICINE

## 2018-06-14 NOTE — LETTER
My Depression Action Plan  Name: Dalia Hart   Date of Birth 1970  Date: 6/14/2018    My doctor: Arely Ryder   My clinic: [address] [address] [phone]          GREEN    ZONE   Good Control    What it looks like:     Things are going generally well. You have normal up s and down s. You may even feel depressed from time to time, but bad moods usually last less than a day.   What you need to do:  1. Continue to care for yourself (see self care plan)  2. Check your depression survival kit and update it as needed  3. Follow your physician s recommendations including any medication.  4. Do not stop taking medication unless you consult with your physician first.           YELLOW         ZONE Getting Worse    What it looks like:     Depression is starting to interfere with your life.     It may be hard to get out of bed; you may be starting to isolate yourself from others.    Symptoms of depression are starting to last most all day and this has happened for several days.     You may have suicidal thoughts but they are not constant.   What you need to do:     1. Call your care team, your response to treatment will improve if you keep your care team informed of your progress. Yellow periods are signs an adjustment may need to be made.     2. Continue your self-care, even if you have to fake it!    3. Talk to someone in your support network    4. Open up your depression survival kit           RED    ZONE Medical Alert - Get Help    What it looks like:     Depression is seriously interfering with your life.     You may experience these or other symptoms: You can t get out of bed most days, can t work or engage in other necessary activities, you have trouble taking care of basic hygiene, or basic responsibilities, thoughts of suicide or death that will not go away, self-injurious behavior.     What you need to do:  1. Call your  care team and request a same-day appointment. If they are not available (weekends or after hours) call your local crisis line, emergency room or 911.            Depression Self Care Plan / Survival Kit    Self-Care for Depression  Here s the deal. Your body and mind are really not as separate as most people think.  What you do and think affects how you feel and how you feel influences what you do and think. This means if you do things that people who feel good do, it will help you feel better.  Sometimes this is all it takes.  There is also a place for medication and therapy depending on how severe your depression is, so be sure to consult with your medical provider and/ or Behavioral Health Consultant if your symptoms are worsening or not improving.     In order to better manage my stress, I will:    Exercise  Get some form of exercise, every day. This will help reduce pain and release endorphins, the  feel good  chemicals in your brain. This is almost as good as taking antidepressants!  This is not the same as joining a gym and then never going! (they count on that by the way ) It can be as simple as just going for a walk or doing some gardening, anything that will get you moving.      Hygiene   Maintain good hygiene (Get out of bed in the morning, Make your bed, Brush your teeth, Take a shower, and Get dressed like you were going to work, even if you are unemployed).  If your clothes don't fit try to get ones that do.    Diet  I will strive to eat foods that are good for me, drink plenty of water, and avoid excessive sugar, caffeine, alcohol, and other mood-altering substances.  Some foods that are helpful in depression are: complex carbohydrates, B vitamins, flaxseed, fish or fish oil, fresh fruits and vegetables.    Psychotherapy  I agree to participate in Individual Therapy (if recommended).    Medication  If prescribed medications, I agree to take them.  Missing doses can result in serious side effects.  I  understand that drinking alcohol, or other illicit drug use, may cause potential side effects.  I will not stop my medication abruptly without first discussing it with my provider.    Staying Connected With Others  I will stay in touch with my friends, family members, and my primary care provider/team.    Use your imagination  Be creative.  We all have a creative side; it doesn t matter if it s oil painting, sand castles, or mud pies! This will also kick up the endorphins.    Witness Beauty  (AKA stop and smell the roses) Take a look outside, even in mid-winter. Notice colors, textures. Watch the squirrels and birds.     Service to others  Be of service to others.  There is always someone else in need.  By helping others we can  get out of ourselves  and remember the really important things.  This also provides opportunities for practicing all the other parts of the program.    Humor  Laugh and be silly!  Adjust your TV habits for less news and crime-drama and more comedy.    Control your stress  Try breathing deep, massage therapy, biofeedback, and meditation. Find time to relax each day.     My support system    Clinic Contact:  Phone number:    Contact 1:  Phone number:    Contact 2:  Phone number:    Synagogue/:  Phone number:    Therapist:  Phone number:    Local crisis center:    Phone number:    Other community support:  Phone number:

## 2018-06-14 NOTE — PROGRESS NOTES
SUBJECTIVE:   Dalia Hart is a 47 year old female who presents to clinic today for the following health issues:    Patient seen prior by Dr. Ryder.    Acute illness      Duration: 1 week     Description  Left ear pain, discomfort     Severity: mild    Accompanying signs and symptoms: seasonal allergies, sinus pressure     History (predisposing factors):  None     Precipitating or alleviating factors: None    Therapies tried and outcome:  none    Problem list and histories reviewed & adjusted, as indicated.  Additional history: as documented    Patient Active Problem List   Diagnosis     Morbid obesity (H)     Tobacco abuse     Past Surgical History:   Procedure Laterality Date     LASER ABLATION VEIN VARICOSE      right leg     TONSILLECTOMY & ADENOIDECTOMY  1975       Social History   Substance Use Topics     Smoking status: Current Some Day Smoker     Packs/day: 0.25     Years: 11.00     Types: Cigarettes     Smokeless tobacco: Never Used      Comment: 1 pack per month     Alcohol use No     Family History   Problem Relation Age of Onset     Type 2 Diabetes Maternal Grandmother      Myocardial Infarction Maternal Grandmother      later in life     Type 2 Diabetes Maternal Aunt      Coronary Artery Disease Maternal Aunt      s/p PCI later in life     CEREBROVASCULAR DISEASE No family hx of      Coronary Artery Disease Early Onset No family hx of      Breast Cancer No family hx of      Colon Cancer No family hx of      Ovarian Cancer No family hx of          Current Outpatient Prescriptions   Medication Sig Dispense Refill     aspirin 81 MG chewable tablet Take 81 mg by mouth daily       nicotine polacrilex (NICORETTE) 2 MG gum 1 piece of gum every 1-2 hours as needed for smoking cessation (Patient taking differently: as needed 1 piece of gum every 1-2 hours as needed for smoking cessation) 100 each 1     Allergies   Allergen Reactions     Bee Venom Anaphylaxis     Other reaction(s): Other, see  "comments  Unsure, swelling,      Seasonal Allergies      BP Readings from Last 3 Encounters:   03/12/18 128/82   01/25/18 120/70   10/27/17 96/68    Wt Readings from Last 3 Encounters:   03/12/18 258 lb 14.4 oz (117.4 kg)   01/25/18 260 lb 12.8 oz (118.3 kg)   10/27/17 256 lb 1.6 oz (116.2 kg)           Reviewed and updated as needed this visit by clinical staff  Tobacco  Allergies  Meds  Problems  Med Hx  Surg Hx  Fam Hx  Soc Hx        Reviewed and updated as needed this visit by Provider       ROS:  CONSTITUTIONAL: NEGATIVE for fever, chills, change in weight  EYES: NEGATIVE for vision changes or irritation  RESP: NEGATIVE for significant cough or SOB  CV: NEGATIVE for chest pain, palpitations or peripheral edema  GI: NEGATIVE for nausea, abdominal pain, heartburn, or change in bowel habits  : NEGATIVE for frequency, dysuria, or hematuria  MUSCULOSKELETAL: NEGATIVE for significant arthralgias or myalgia  HEME: NEGATIVE for bleeding problems  PSYCHIATRIC: NEGATIVE for changes in mood or affect    OBJECTIVE:                                                    /62  Pulse 68  Temp 98.7  F (37.1  C) (Oral)  Resp 16  Ht 5' 6\" (1.676 m)  Wt 255 lb 12.8 oz (116 kg)  LMP 05/15/2018  SpO2 98%  Breastfeeding? No  BMI 41.29 kg/m2  Body mass index is 41.29 kg/(m^2).  GENERAL: healthy, alert and no distress  EYES: Eyes grossly normal to inspection, extraocular movements - intact, and PERRL  HENT: ear canals- normal; TMs- normal; Nose- normal; Mouth- no ulcers, no lesions  NECK: no tenderness, no adenopathy, no asymmetry, no masses, no stiffness; thyroid- normal to palpation, no TMJ tenderness.  RESP: lungs clear to auscultation - no rales, no rhonchi, no wheezes  CV: regular rates and rhythm, normal S1 S2, no S3 or S4 and click or rub   MS: extremities- no gross deformities noted  NEURO:  No focal change  PSYCH: Alert and oriented times 3; speech- coherent , normal rate and volume; able to articulate " logical thoughts, able to abstract reason, no tangential thoughts, no hallucinations or delusions, affect- normal     ASSESSMENT/PLAN:                                                      (H92.09) Otalgia, unspecified laterality  (primary encounter diagnosis)  Comment: There is no focal changes on exam that would account for patient's discomfort.  Plan: Question component of seasonal allergic rhinitis with eustachian tube dysfunction.  There does not appear to be any TMJ tenderness.  Suggested trial of Flonase nasal inhaler in combination with oral antihistamine and observe    (E66.01) Morbid obesity (H)  Comment: Discussed and encouraged weight reduction  Plan:     (Z72.0) Tobacco abuse  Comment: Smoking cessation was advised and the risks of continued smoking in regards to this patients health history was reiterated. Options of smoking cessation were also discussed. This time extended beyond the routine exam.  Plan:         See Patient Instructions    Nolan Gallo MD  [facility]

## 2018-06-14 NOTE — MR AVS SNAPSHOT
After Visit Summary   6/14/2018    Dalia Hart    MRN: [id number]           Patient Information     Date Of Birth          1970        Visit Information        Provider Department      6/14/2018 10:00 AM Nolan Gallo MD [facility]        Today's Diagnoses     Otalgia, unspecified laterality    -  1    Morbid obesity (H)        Tobacco abuse           Follow-ups after your visit        Follow-up notes from your care team     Return if symptoms worsen or fail to improve.      Who to contact     If you have questions or need follow up information about today's clinic visit or your schedule please contact [facility] directly at [phone].  Normal or non-critical lab and imaging results will be communicated to you by MyChart, letter or phone within 4 business days after the clinic has received the results. If you do not hear from us within 7 days, please contact the clinic through [employer] or phone. If you have a critical or abnormal lab result, we will notify you by phone as soon as possible.  Submit refill requests through [employer] or call your pharmacy and they will forward the refill request to us. Please allow 3 business days for your refill to be completed.          Additional Information About Your Visit        MyChart Information     [employer] gives you secure access to your electronic health record. If you see a primary care provider, you can also send messages to your care team and make appointments. If you have questions, please call your primary care clinic.  If you do not have a primary care provider, please call [phone] and they will assist you.        Care EveryWhere ID     This is your Care EveryWhere ID. This could be used by other organizations to access your [facility] medical records  [id number]        Your Vitals Were     Pulse Temperature Respirations Height Last Period Pulse Oximetry    68 98.7  F (37.1  C) (Oral) 16  "5' 6\" (1.676 m) 05/15/2018 98%    Breastfeeding? BMI (Body Mass Index)                No 41.29 kg/m2           Blood Pressure from Last 3 Encounters:   06/14/18 100/62   03/12/18 128/82   01/25/18 120/70    Weight from Last 3 Encounters:   06/14/18 255 lb 12.8 oz (116 kg)   03/12/18 258 lb 14.4 oz (117.4 kg)   01/25/18 260 lb 12.8 oz (118.3 kg)              We Performed the Following     DEPRESSION ACTION PLAN (DAP)          Today's Medication Changes          These changes are accurate as of 6/14/18 11:04 AM.  If you have any questions, ask your nurse or doctor.               These medicines have changed or have updated prescriptions.        Dose/Directions    nicotine polacrilex 2 MG gum   Commonly known as:  NICORETTE   This may have changed:    - when to take this  - reasons to take this  - additional instructions   Used for:  Encounter for smoking cessation counseling        1 piece of gum every 1-2 hours as needed for smoking cessation   Quantity:  100 each   Refills:  1                Primary Care Provider Office Phone # Fax #    Arely Ryder [phone]       [address] [place] [address]        Equal Access to Services     BARBARA LEMON AH: N[place] romoo Soevita, waaxda luqadaha, qaybta kaalmada adeegyada, merary arreguin. So [facility] [phone].    ATENCIÓN: Si habla español, tiene a carrasco disposición servicios gratuitos de asistencia lingüística. Llame al [phone].    We comply with applicable federal civil rights laws and [place] laws. We do not discriminate on the basis of race, color, national origin, age, disability, sex, sexual orientation, or gender identity.            Thank you!     Thank you for choosing [facility]  for your care. Our goal is always to provide you with excellent care. Hearing back from our patients is one way we can continue to improve our services. Please take a few minutes to complete the " written survey that you may receive in the mail after your visit with us. Thank you!             Your Updated Medication List - Protect others around you: Learn how to safely use, store and throw away your medicines at www.disposemymeds.org.          This list is accurate as of 6/14/18 11:04 AM.  Always use your most recent med list.                   Brand Name Dispense Instructions for use Diagnosis    aspirin 81 MG chewable tablet      Take 81 mg by mouth daily        nicotine polacrilex 2 MG gum    NICORETTE    100 each    1 piece of gum every 1-2 hours as needed for smoking cessation    Encounter for smoking cessation counseling

## 2018-07-07 ENCOUNTER — OFFICE VISIT (OUTPATIENT)
Dept: URGENT CARE | Facility: URGENT CARE | Age: 48
End: 2018-07-07
Payer: COMMERCIAL

## 2018-07-07 VITALS
TEMPERATURE: 98 F | HEART RATE: 82 BPM | OXYGEN SATURATION: 97 % | SYSTOLIC BLOOD PRESSURE: 100 MMHG | WEIGHT: 253.25 LBS | DIASTOLIC BLOOD PRESSURE: 65 MMHG | BODY MASS INDEX: 40.88 KG/M2

## 2018-07-07 DIAGNOSIS — R53.83 FATIGUE, UNSPECIFIED TYPE: Primary | ICD-10-CM

## 2018-07-07 DIAGNOSIS — R31.29 MICROSCOPIC HEMATURIA: ICD-10-CM

## 2018-07-07 LAB
ALBUMIN UR-MCNC: NEGATIVE MG/DL
ANION GAP SERPL CALCULATED.3IONS-SCNC: 8 MMOL/L (ref 3–14)
APPEARANCE UR: CLEAR
BACTERIA #/AREA URNS HPF: ABNORMAL /HPF
BASOPHILS # BLD AUTO: 0.1 10E9/L (ref 0–0.2)
BASOPHILS NFR BLD AUTO: 0.5 %
BILIRUB UR QL STRIP: NEGATIVE
BUN SERPL-MCNC: 14 MG/DL (ref 7–30)
CALCIUM SERPL-MCNC: 8.5 MG/DL (ref 8.5–10.1)
CHLORIDE SERPL-SCNC: 109 MMOL/L (ref 94–109)
CO2 SERPL-SCNC: 24 MMOL/L (ref 20–32)
COLOR UR AUTO: YELLOW
CREAT SERPL-MCNC: 0.89 MG/DL (ref 0.52–1.04)
DIFFERENTIAL METHOD BLD: ABNORMAL
EOSINOPHIL # BLD AUTO: 0.2 10E9/L (ref 0–0.7)
EOSINOPHIL NFR BLD AUTO: 1.5 %
ERYTHROCYTE [DISTWIDTH] IN BLOOD BY AUTOMATED COUNT: 12.8 % (ref 10–15)
GFR SERPL CREATININE-BSD FRML MDRD: 67 ML/MIN/1.7M2
GLUCOSE SERPL-MCNC: 92 MG/DL (ref 70–99)
GLUCOSE UR STRIP-MCNC: NEGATIVE MG/DL
HCT VFR BLD AUTO: 47.4 % (ref 35–47)
HGB BLD-MCNC: 15.3 G/DL (ref 11.7–15.7)
HGB UR QL STRIP: ABNORMAL
KETONES UR STRIP-MCNC: NEGATIVE MG/DL
LEUKOCYTE ESTERASE UR QL STRIP: NEGATIVE
LYMPHOCYTES # BLD AUTO: 3.7 10E9/L (ref 0.8–5.3)
LYMPHOCYTES NFR BLD AUTO: 35.5 %
MCH RBC QN AUTO: 29.8 PG (ref 26.5–33)
MCHC RBC AUTO-ENTMCNC: 32.3 G/DL (ref 31.5–36.5)
MCV RBC AUTO: 92 FL (ref 78–100)
MONOCYTES # BLD AUTO: 0.8 10E9/L (ref 0–1.3)
MONOCYTES NFR BLD AUTO: 7.8 %
NEUTROPHILS # BLD AUTO: 5.7 10E9/L (ref 1.6–8.3)
NEUTROPHILS NFR BLD AUTO: 54.7 %
NITRATE UR QL: NEGATIVE
NON-SQ EPI CELLS #/AREA URNS LPF: ABNORMAL /LPF
PH UR STRIP: 5.5 PH (ref 5–7)
PLATELET # BLD AUTO: 363 10E9/L (ref 150–450)
POTASSIUM SERPL-SCNC: 3.8 MMOL/L (ref 3.4–5.3)
RBC # BLD AUTO: 5.14 10E12/L (ref 3.8–5.2)
RBC #/AREA URNS AUTO: ABNORMAL /HPF
SODIUM SERPL-SCNC: 141 MMOL/L (ref 133–144)
SOURCE: ABNORMAL
SP GR UR STRIP: 1.02 (ref 1–1.03)
TSH SERPL DL<=0.005 MIU/L-ACNC: 1.82 MU/L (ref 0.4–4)
UROBILINOGEN UR STRIP-ACNC: 0.2 EU/DL (ref 0.2–1)
WBC # BLD AUTO: 10.4 10E9/L (ref 4–11)
WBC #/AREA URNS AUTO: ABNORMAL /HPF

## 2018-07-07 PROCEDURE — 84443 ASSAY THYROID STIM HORMONE: CPT | Performed by: PHYSICIAN ASSISTANT

## 2018-07-07 PROCEDURE — 86618 LYME DISEASE ANTIBODY: CPT | Performed by: PHYSICIAN ASSISTANT

## 2018-07-07 PROCEDURE — 85025 COMPLETE CBC W/AUTO DIFF WBC: CPT | Performed by: PHYSICIAN ASSISTANT

## 2018-07-07 PROCEDURE — 80048 BASIC METABOLIC PNL TOTAL CA: CPT | Performed by: PHYSICIAN ASSISTANT

## 2018-07-07 PROCEDURE — 99213 OFFICE O/P EST LOW 20 MIN: CPT | Performed by: PHYSICIAN ASSISTANT

## 2018-07-07 PROCEDURE — 36415 COLL VENOUS BLD VENIPUNCTURE: CPT | Performed by: PHYSICIAN ASSISTANT

## 2018-07-07 PROCEDURE — 81001 URINALYSIS AUTO W/SCOPE: CPT | Performed by: PHYSICIAN ASSISTANT

## 2018-07-07 NOTE — MR AVS SNAPSHOT
After Visit Summary   7/7/2018    Dalia Hart    MRN: [id number]           Patient Information     Date Of Birth          1970        Visit Information        Provider Department      7/7/2018 7:05 PM Billy Ryan PA-C [facility]        Today's Diagnoses     Fatigue, unspecified type    -  1    Microscopic hematuria           Follow-ups after your visit        Who to contact     If you have questions or need follow up information about today's clinic visit or your schedule please contact [facility] directly at [phone].  Normal or non-critical lab and imaging results will be communicated to you by [employer], letter or phone within 4 business days after the clinic has received the results. If you do not hear from us within 7 days, please contact the clinic through [employer] or phone. If you have a critical or abnormal lab result, we will notify you by phone as soon as possible.  Submit refill requests through [employer] or call your pharmacy and they will forward the refill request to us. Please allow 3 business days for your refill to be completed.          Additional Information About Your Visit        MyChart Information     [employer] gives you secure access to your electronic health record. If you see a primary care provider, you can also send messages to your care team and make appointments. If you have questions, please call your primary care clinic.  If you do not have a primary care provider, please call [phone] and they will assist you.        Care EveryWhere ID     This is your Care EveryWhere ID. This could be used by other organizations to access your [facility] medical records  [id number]        Your Vitals Were     Pulse Temperature Pulse Oximetry BMI (Body Mass Index)          82 98  F (36.7  C) (Oral) 97% 40.88 kg/m2         Blood Pressure from Last 3 Encounters:   07/07/18 100/65   06/14/18 100/62   03/12/18 128/82     Weight from Last 3 Encounters:   07/07/18 253 lb 4 oz (114.9 kg)   06/14/18 255 lb 12.8 oz (116 kg)   03/12/18 258 lb 14.4 oz (117.4 kg)              We Performed the Following     Basic metabolic panel     CBC with platelets differential     Lyme Disease Anupama with reflex to WB Serum     TSH     UA with Microscopic          Today's Medication Changes          These changes are accurate as of 7/7/18 11:59 PM.  If you have any questions, ask your nurse or doctor.               These medicines have changed or have updated prescriptions.        Dose/Directions    nicotine polacrilex 2 MG gum   Commonly known as:  NICORETTE   This may have changed:    - when to take this  - reasons to take this  - additional instructions   Used for:  Encounter for smoking cessation counseling        1 piece of gum every 1-2 hours as needed for smoking cessation   Quantity:  100 each   Refills:  1                Primary Care Provider Office Phone # Fax #    Arely Ryder [phone]       [address] [place] [address]        Equal Access to Services     BARBARA LEMON : Hadii aad ku hadasho Soomaali, waaxda luqadaha, qaybta kaalmada adeegyada, waxay brannonin hayanita tan . So [facility] [phone].    ATENCIÓN: Si habla español, tiene a carrasco disposición servicios gratuitos de asistencia lingüística. Llame al [phone].    We comply with applicable federal civil rights laws and [place] laws. We do not discriminate on the basis of race, color, national origin, age, disability, sex, sexual orientation, or gender identity.            Thank you!     Thank you for choosing [facility]  for your care. Our goal is always to provide you with excellent care. Hearing back from our patients is one way we can continue to improve our services. Please take a few minutes to complete the written survey that you may receive in the mail after your visit with us. Thank you!             Your  Updated Medication List - Protect others around you: Learn how to safely use, store and throw away your medicines at www.disposemymeds.org.          This list is accurate as of 7/7/18 11:59 PM.  Always use your most recent med list.                   Brand Name Dispense Instructions for use Diagnosis    aspirin 81 MG chewable tablet      Take 81 mg by mouth daily        nicotine polacrilex 2 MG gum    NICORETTE    100 each    1 piece of gum every 1-2 hours as needed for smoking cessation    Encounter for smoking cessation counseling

## 2018-07-09 LAB — B BURGDOR IGG+IGM SER QL: 0.09 (ref 0–0.89)

## 2018-07-15 NOTE — PROGRESS NOTES
SUBJECTIVE:  Chief Complaint   Patient presents with     Dizziness     dizzy, headache, sleeping a lot, pain in legs and diarrhea for a few days.      Dalia Hart is a 47 year old female whose symptoms began 1 day ago and include diarrhea she had for one day she has also had myalgias arthralgias.  She started out with a 4-5 day history of diarrhea and then abated and then after 2-3 days return for the 1 day of diarrhea she is having now.  She also has been having some fatigue but is denying sore throat odynophagia fever chills night sweats cough abdominal pain or urinary symptoms.  She has had no past medical history of diabetes mellitus and no history of previous thyroid problems.  She has been screened in the past.      Symptoms are gradual onset and still present and mild.    Aggravating factors: nothing.    Alleviating factors:nothing  Associated symptoms:  Pain:No  Fever: no noted fevers  Diarrhea:  consists of 2 stools/day and is persisting  Stools: watery and loose  Appetite: normal  Risk factors: Patient denies all of the following: sick contacts, possible bad food exposure, travel , recent antibiotic use, recent medication changes, hx of IBS.    Past Medical History:   Diagnosis Date     Morbid obesity (H)    .  Current Outpatient Prescriptions   Medication Sig Dispense Refill     nicotine polacrilex (NICORETTE) 2 MG gum 1 piece of gum every 1-2 hours as needed for smoking cessation (Patient taking differently: as needed 1 piece of gum every 1-2 hours as needed for smoking cessation) 100 each 1     aspirin 81 MG chewable tablet Take 81 mg by mouth daily       Social History   Substance Use Topics     Smoking status: Current Some Day Smoker     Packs/day: 0.25     Years: 11.00     Types: Cigarettes     Smokeless tobacco: Never Used      Comment: 1 pack per month     Alcohol use No       ROS:  Review of systems negative except as stated above.    OBJECTIVE:  /65  Pulse 82  Temp 98  F (36.7  C)  (Oral)  Wt 253 lb 4 oz (114.9 kg)  SpO2 97%  BMI 40.88 kg/m2  GENERAL APPEARANCE: healthy, alert and no distress  EYES: EOMI,  PERRL, conjunctiva clear  HENT: ear canals and TM's normal.  Nose and mouth without ulcers, erythema or lesions  NECK: supple, nontender, no lymphadenopathy  RESP: lungs clear to auscultation  CV: regular rates and rhythm, normal S1 S2, no murmur noted  ABDOMEN:  soft, nontendero   NEURO: Normal strength and tone, normal speech and mentation  SKIN: no suspicious lesions or rashes    Lab:.  Results for orders placed or performed in visit on 07/07/18   CBC with platelets differential   Result Value Ref Range    WBC 10.4 4.0 - 11.0 10e9/L    RBC Count 5.14 3.8 - 5.2 10e12/L    Hemoglobin 15.3 11.7 - 15.7 g/dL    Hematocrit 47.4 (H) 35.0 - 47.0 %    MCV 92 78 - 100 fl    MCH 29.8 26.5 - 33.0 pg    MCHC 32.3 31.5 - 36.5 g/dL    RDW 12.8 10.0 - 15.0 %    Platelet Count 363 150 - 450 10e9/L    Diff Method Automated Method     % Neutrophils 54.7 %    % Lymphocytes 35.5 %    % Monocytes 7.8 %    % Eosinophils 1.5 %    % Basophils 0.5 %    Absolute Neutrophil 5.7 1.6 - 8.3 10e9/L    Absolute Lymphocytes 3.7 0.8 - 5.3 10e9/L    Absolute Monocytes 0.8 0.0 - 1.3 10e9/L    Absolute Eosinophils 0.2 0.0 - 0.7 10e9/L    Absolute Basophils 0.1 0.0 - 0.2 10e9/L   UA with Microscopic   Result Value Ref Range    Color Urine Yellow     Appearance Urine Clear     Glucose Urine Negative NEG^Negative mg/dL    Bilirubin Urine Negative NEG^Negative    Ketones Urine Negative NEG^Negative mg/dL    Specific Gravity Urine 1.025 1.003 - 1.035    pH Urine 5.5 5.0 - 7.0 pH    Protein Albumin Urine Negative NEG^Negative mg/dL    Urobilinogen Urine 0.2 0.2 - 1.0 EU/dL    Nitrite Urine Negative NEG^Negative    Blood Urine Small (A) NEG^Negative    Leukocyte Esterase Urine Negative NEG^Negative    Source Midstream Urine     WBC Urine 0 - 5 OTO5^0 - 5 /HPF    RBC Urine 2-5 (A) OTO2^O - 2 /HPF    Squamous Epithelial /LPF Urine  Few FEW^Few /LPF    Bacteria Urine Few (A) NEG^Negative /HPF   Basic metabolic panel   Result Value Ref Range    Sodium 141 133 - 144 mmol/L    Potassium 3.8 3.4 - 5.3 mmol/L    Chloride 109 94 - 109 mmol/L    Carbon Dioxide 24 20 - 32 mmol/L    Anion Gap 8 3 - 14 mmol/L    Glucose 92 70 - 99 mg/dL    Urea Nitrogen 14 7 - 30 mg/dL    Creatinine 0.89 0.52 - 1.04 mg/dL    GFR Estimate 67 >60 mL/min/1.7m2    GFR Estimate If Black 82 >60 mL/min/1.7m2    Calcium 8.5 8.5 - 10.1 mg/dL   Lyme Disease Anupama with reflex to WB Serum   Result Value Ref Range    Lyme Disease Antibodies Serum 0.09 0.00 - 0.89   TSH   Result Value Ref Range    TSH 1.82 0.40 - 4.00 mU/L           ASSESSMENT:  Encounter Diagnoses   Name Primary?     Fatigue, unspecified type Yes     Microscopic hematuria        PLAN:    Diet: advance diet as tolerated and small amounts clear fluids frequently,soups,juices,water,advance diet as tolerated  Follow-up with PCP for reevaluation of her symptoms progress with a conservative course of treatment and then for follow-up with her lab results for interpretation of her Lyme disease screening and reevaluation of the microscopic hematuria.

## 2019-04-23 ENCOUNTER — MYC MEDICAL ADVICE (OUTPATIENT)
Dept: INTERNAL MEDICINE | Facility: CLINIC | Age: 49
End: 2019-04-23

## 2019-04-23 NOTE — TELEPHONE ENCOUNTER
Panel Management Review    Patient Active Problem List   Diagnosis     Morbid obesity (H)     Tobacco abuse       Patient has the following on her problem list: None      Composite cancer screening  Chart review shows that this patient is due/due soon for the following Mammogram and Colonoscopy  Summary:    Patient is due/failing the following:   COLONOSCOPY, MAMMOGRAM and PHYSICAL    Action needed:   Patient needs office visit for Physical, mammo, colonoscopy.    Type of outreach:    Sent [employer] message.    Questions for provider review:    None                                                                                                                                    Angely uDffy CMA       Chart routed to Care Team .

## 2019-12-01 ENCOUNTER — OFFICE VISIT (OUTPATIENT)
Dept: URGENT CARE | Facility: URGENT CARE | Age: 49
End: 2019-12-01
Payer: COMMERCIAL

## 2019-12-01 VITALS
SYSTOLIC BLOOD PRESSURE: 120 MMHG | DIASTOLIC BLOOD PRESSURE: 90 MMHG | HEIGHT: 66 IN | HEART RATE: 86 BPM | TEMPERATURE: 97.1 F | WEIGHT: 225 LBS | BODY MASS INDEX: 36.16 KG/M2 | OXYGEN SATURATION: 100 %

## 2019-12-01 DIAGNOSIS — A60.00 RECURRENT GENITAL HERPES: Primary | ICD-10-CM

## 2019-12-01 DIAGNOSIS — J02.9 VIRAL PHARYNGITIS: ICD-10-CM

## 2019-12-01 PROCEDURE — 99213 OFFICE O/P EST LOW 20 MIN: CPT | Performed by: FAMILY MEDICINE

## 2019-12-01 RX ORDER — VALACYCLOVIR HYDROCHLORIDE 500 MG/1
500 TABLET, FILM COATED ORAL 2 TIMES DAILY
Qty: 6 TABLET | Refills: 2 | Status: SHIPPED | OUTPATIENT
Start: 2019-12-01 | End: 2023-01-03

## 2019-12-01 RX ORDER — VALACYCLOVIR HYDROCHLORIDE 1 G/1
2000 TABLET, FILM COATED ORAL 2 TIMES DAILY
Qty: 4 TABLET | Refills: 0 | Status: CANCELLED | OUTPATIENT
Start: 2019-12-01 | End: 2019-12-02

## 2019-12-01 ASSESSMENT — MIFFLIN-ST. JEOR: SCORE: 1652.34

## 2019-12-01 NOTE — PROGRESS NOTES
"Subjective:   Dalia Hart is a 49 year old female who presents for   Chief Complaint   Patient presents with     Urgent Care     Pharyngitis     Pt states sore throat sxs 2x days       Medication Request     Requesting a refll on valtrex, herpes outbreak      1) early discomfort/symptoms of groin region herpes. No crusting or lesions. Has been dealing with this since a teenager age 14. In the last 5 years she has been having episodes about 2-3 times.     2) Also presents today with a sore throat. She does not have her tonsils. No fevers or body aches. Without vomiting/diarrhea. No others sick around her other than common cold in the general work area. She did receive the flu shot. Patient does not have a cough at this time. Discomfort 1/10 of her throat. No pain with swallowing.       Patient Active Problem List    Diagnosis Date Noted     Tobacco abuse 06/14/2018     Priority: Medium     Morbid obesity (H)      Priority: Medium       Current Outpatient Medications   Medication     aspirin 81 MG chewable tablet     valACYclovir (VALTREX) 500 MG tablet     nicotine polacrilex (NICORETTE) 2 MG gum     No current facility-administered medications for this visit.        ROS:  As above per HPI    Objective:   BP (!) 120/90   Pulse 86   Temp 97.1  F (36.2  C) (Oral)   Ht 1.676 m (5' 6\")   Wt 102.1 kg (225 lb)   SpO2 100%   BMI 36.32 kg/m  , Body mass index is 36.32 kg/m .  Gen:  NAD, well-nourished, sitting in chair comfortably  HEENT: EOMI, sclera anicteric, Head normocephalic, ; nares patent; moist mucous membranes. Absent tonsils  Neck: trachea midline, no thyromegaly  CV:  Hemodynamically stable, RRR  Pulm:  no increased work of breathing , CTAB, no wheezes/rales/rhonchi   ABD: soft, non-distended  Extrem: no cyanosis, edema or clubbing  Skin: no obvious rashes or abnormalities  Psych: Euthymic, linear thoughts, normal rate of speech  : deferred    No results found for any visits on " 12/01/19.    Assessment & Plan:   Dalia Hart, 49 year old female who presents with:  Recurrent genital herpes  Refill provided. Exam deferred. Patient not concerned with these early lesion (s).   - valACYclovir (VALTREX) 500 MG tablet  Dispense: 6 tablet; Refill: 2    Viral pharyngitis  Mild symptoms, exam benign. Stable vitals. F/u as needed.       Adi Verduzco MD   [facility]    The use of Dragon/[employer] dictation services may have been used to construct the content in this note; any grammatical or spelling errors are non-intentional. Please contact the author of this note directly if you are in need of any clarification.

## 2019-12-02 NOTE — PATIENT INSTRUCTIONS
Take valtrex as prescribed      If your sore throat gets worse or you develop new symptoms please call the clinic to discuss if you are concerned

## 2020-02-08 ENCOUNTER — HEALTH MAINTENANCE LETTER (OUTPATIENT)
Age: 50
End: 2020-02-08

## 2020-10-04 ENCOUNTER — OFFICE VISIT (OUTPATIENT)
Dept: URGENT CARE | Facility: URGENT CARE | Age: 50
End: 2020-10-04
Payer: COMMERCIAL

## 2020-10-04 VITALS
BODY MASS INDEX: 35.02 KG/M2 | HEART RATE: 72 BPM | WEIGHT: 217 LBS | RESPIRATION RATE: 16 BRPM | SYSTOLIC BLOOD PRESSURE: 98 MMHG | DIASTOLIC BLOOD PRESSURE: 72 MMHG | TEMPERATURE: 98.4 F | OXYGEN SATURATION: 100 %

## 2020-10-04 DIAGNOSIS — B96.89 BACTERIAL VAGINOSIS: ICD-10-CM

## 2020-10-04 DIAGNOSIS — R30.0 BURNING WITH URINATION: Primary | ICD-10-CM

## 2020-10-04 DIAGNOSIS — N89.8 VAGINAL ITCHING: ICD-10-CM

## 2020-10-04 DIAGNOSIS — N76.0 BACTERIAL VAGINOSIS: ICD-10-CM

## 2020-10-04 LAB
ALBUMIN UR-MCNC: NEGATIVE MG/DL
APPEARANCE UR: CLEAR
BACTERIA #/AREA URNS HPF: ABNORMAL /HPF
BILIRUB UR QL STRIP: NEGATIVE
COLOR UR AUTO: YELLOW
GLUCOSE UR STRIP-MCNC: NEGATIVE MG/DL
HGB UR QL STRIP: ABNORMAL
KETONES UR STRIP-MCNC: NEGATIVE MG/DL
LEUKOCYTE ESTERASE UR QL STRIP: ABNORMAL
NITRATE UR QL: NEGATIVE
NON-SQ EPI CELLS #/AREA URNS LPF: ABNORMAL /LPF
PH UR STRIP: 6.5 PH (ref 5–7)
RBC #/AREA URNS AUTO: ABNORMAL /HPF
SOURCE: ABNORMAL
SP GR UR STRIP: 1.01 (ref 1–1.03)
SPECIMEN SOURCE: ABNORMAL
UROBILINOGEN UR STRIP-ACNC: 0.2 EU/DL (ref 0.2–1)
WBC #/AREA URNS AUTO: ABNORMAL /HPF
WET PREP SPEC: ABNORMAL

## 2020-10-04 PROCEDURE — 87086 URINE CULTURE/COLONY COUNT: CPT | Performed by: PHYSICIAN ASSISTANT

## 2020-10-04 PROCEDURE — 99214 OFFICE O/P EST MOD 30 MIN: CPT | Performed by: PHYSICIAN ASSISTANT

## 2020-10-04 PROCEDURE — 81001 URINALYSIS AUTO W/SCOPE: CPT | Performed by: PHYSICIAN ASSISTANT

## 2020-10-04 PROCEDURE — 87210 SMEAR WET MOUNT SALINE/INK: CPT | Performed by: PHYSICIAN ASSISTANT

## 2020-10-04 RX ORDER — METRONIDAZOLE 500 MG/1
500 TABLET ORAL 2 TIMES DAILY
Qty: 14 TABLET | Refills: 0 | Status: SHIPPED | OUTPATIENT
Start: 2020-10-04 | End: 2020-10-11

## 2020-10-04 RX ORDER — CIPROFLOXACIN 250 MG/1
250 TABLET, FILM COATED ORAL 2 TIMES DAILY
Qty: 10 TABLET | Refills: 0 | Status: SHIPPED | OUTPATIENT
Start: 2020-10-04 | End: 2020-10-09

## 2020-10-04 RX ORDER — FLUCONAZOLE 150 MG/1
150 TABLET ORAL ONCE
Qty: 1 TABLET | Refills: 0 | Status: SHIPPED | OUTPATIENT
Start: 2020-10-04 | End: 2020-10-04

## 2020-10-04 RX ORDER — MICONAZOLE NITRATE 1200MG-2%
1 KIT VAGINAL AT BEDTIME
Qty: 1 KIT | Refills: 0 | Status: SHIPPED | OUTPATIENT
Start: 2020-10-04 | End: 2020-12-13

## 2020-10-04 NOTE — NURSING NOTE
"Vital signs:  Temp: 98.4  F (36.9  C) Temp src: Oral BP: 98/72 Pulse: 72   Resp: 16 SpO2: 100 %       Weight: 98.4 kg (217 lb)  Estimated body mass index is 35.02 kg/m  as calculated from the following:    Height as of 12/1/19: 1.676 m (5' 6\").    Weight as of this encounter: 98.4 kg (217 lb).          "

## 2020-10-04 NOTE — PROGRESS NOTES
SUBJECTIVE:  Dalia Hart is a 50 year old female who presents with dysuria, vaginal itching.    Onset of symptoms few days  ago, gradually worsening since.     Pain:none.     Vaginal bleeding: No      Vaginal symptoms: local irritation and vulvar itching  No LMP recorded.    Sexually active: yes  Predisposing factors: hx of sexual activity  Hx of previous symptom: some    Past Medical History:   Diagnosis Date     Morbid obesity (H)      Current Outpatient Medications   Medication Sig Dispense Refill     ciprofloxacin (CIPRO) 250 MG tablet Take 1 tablet (250 mg) by mouth 2 times daily for 5 days 10 tablet 0     fluconazole (DIFLUCAN) 150 MG tablet Take 1 tablet (150 mg) by mouth once for 1 dose 1 tablet 0     metroNIDAZOLE (FLAGYL) 500 MG tablet Take 1 tablet (500 mg) by mouth 2 times daily for 7 days 14 tablet 0     Miconazole Nitrate-Wipes (MONISTAT 7 COMPLETE THERAPY) 100-2 MG-% KIT Place 1 dose ONLY on day(s) in instructions vaginally At Bedtime 1 kit 0     valACYclovir (VALTREX) 500 MG tablet Take 1 tablet (500 mg) by mouth 2 times daily for 3 days 6 tablet 2     aspirin 81 MG chewable tablet Take 81 mg by mouth daily       nicotine polacrilex (NICORETTE) 2 MG gum 1 piece of gum every 1-2 hours as needed for smoking cessation (Patient not taking: Reported on 10/4/2020) 100 each 1     Social History     Socioeconomic History     Marital status: Single     Spouse name: Not on file     Number of children: 3     Years of education: Not on file     Highest education level: Not on file   Occupational History     Occupation: Lactation Resouce Specialist   Social Needs     Financial resource strain: Not on file     Food insecurity     Worry: Not on file     Inability: Not on file     Transportation needs     Medical: Not on file     Non-medical: Not on file   Tobacco Use     Smoking status: Current Some Day Smoker     Packs/day: 0.25     Years: 11.00     Pack years: 2.75     Types: Cigarettes     Smokeless  tobacco: Never Used     Tobacco comment: 1 pack per month   Substance and Sexual Activity     Alcohol use: No     Alcohol/week: 0.0 standard drinks     Drug use: No     Sexual activity: Not Currently   Lifestyle     Physical activity     Days per week: Not on file     Minutes per session: Not on file     Stress: Not on file   Relationships     Social connections     Talks on phone: Not on file     Gets together: Not on file     Attends Sabianism service: Not on file     Active member of club or organization: Not on file     Attends meetings of clubs or organizations: Not on file     Relationship status: Not on file     Intimate partner violence     Fear of current or ex partner: Not on file     Emotionally abused: Not on file     Physically abused: Not on file     Forced sexual activity: Not on file   Other Topics Concern     Parent/sibling w/ CABG, MI or angioplasty before 65F 55M? Not Asked   Social History Narrative    Single.    3 children (19, 14, and 12 as of 2018).    No formal exercise.      Family History   Problem Relation Age of Onset     Diabetes Type 2  Maternal Grandmother      Myocardial Infarction Maternal Grandmother         later in life     Diabetes Type 2  Maternal Aunt      Coronary Artery Disease Maternal Aunt         s/p PCI later in life     Cerebrovascular Disease No family hx of      Coronary Artery Disease Early Onset No family hx of      Breast Cancer No family hx of      Colon Cancer No family hx of      Ovarian Cancer No family hx of        ROS:   CONSTITUTIONAL:NEGATIVE for fever, chills, change in weight  INTEGUMENTARY/SKIN: POSITIVE for mild itching in vaginal   ENT/MOUTH: NEGATIVE for ear, mouth and throat problems  RESP:NEGATIVE for significant cough or SOB  CV: NEGATIVE for chest pain, palpitations or peripheral edema  GI: NEGATIVE for nausea, abdominal pain, heartburn, or change in bowel habits  : dysuria and frequency and vaginal itching  MUSCULOSKELETAL: NEGATIVE for  significant arthralgias or myalgia  NEURO: NEGATIVE for weakness, dizziness or paresthesias    OBJECTIVE:  BP 98/72   Pulse 72   Temp 98.4  F (36.9  C) (Oral)   Resp 16   Wt 98.4 kg (217 lb)   SpO2 100%   BMI 35.02 kg/m      GENERAL APPEARANCE: healthy, alert and no distress  CV: regular rates and rhythm, normal S1 S2, no murmur noted  ABDOMEN:  soft, nontender, no HSM or masses and bowel sounds normal  BACK: No CVA tenderness  SKIN: no suspicious lesions or rashes  NEURO: Normal strength and tone, sensory exam grossly normal  LYMPH: Negative for lymphadenitis   : Deferred    Results for orders placed or performed in visit on 10/04/20   *UA reflex to Microscopic and Culture ([facility] (except [place] and [place])     Status: Abnormal    Specimen: Midstream Urine   Result Value Ref Range    Color Urine Yellow     Appearance Urine Clear     Glucose Urine Negative NEG^Negative mg/dL    Bilirubin Urine Negative NEG^Negative    Ketones Urine Negative NEG^Negative mg/dL    Specific Gravity Urine 1.015 1.003 - 1.035    Blood Urine Trace (A) NEG^Negative    pH Urine 6.5 5.0 - 7.0 pH    Protein Albumin Urine Negative NEG^Negative mg/dL    Urobilinogen Urine 0.2 0.2 - 1.0 EU/dL    Nitrite Urine Negative NEG^Negative    Leukocyte Esterase Urine Trace (A) NEG^Negative    Source Midstream Urine    Urine Microscopic     Status: Abnormal   Result Value Ref Range    WBC Urine 5-10 (A) OTO5^0 - 5 /HPF    RBC Urine O - 2 OTO2^O - 2 /HPF    Squamous Epithelial /LPF Urine Moderate (A) FEW^Few /LPF    Bacteria Urine Few (A) NEG^Negative /HPF   Wet prep     Status: Abnormal    Specimen: Vagina   Result Value Ref Range    Specimen Description Vagina     Wet Prep No Trichomonas seen     Wet Prep Yeast seen (A)     Wet Prep Clue cells seen (A)     Wet Prep Rare  WBC'S seen          ASSESSMENT/PLAN      ICD-10-CM    1. Burning with urination  R30.0 *UA reflex to Microscopic and Culture ([place] [facility]  (except [place] and [place])     Urine Microscopic     Urine Culture Aerobic Bacterial     ciprofloxacin (CIPRO) 250 MG tablet   2. Vaginal itching  N89.8 Wet prep     fluconazole (DIFLUCAN) 150 MG tablet     Miconazole Nitrate-Wipes (MONISTAT 7 COMPLETE THERAPY) 100-2 MG-% KIT   3. Bacterial vaginosis  N76.0 metroNIDAZOLE (FLAGYL) 500 MG tablet    B96.89        Wet prep + for yeast and BV  Urine Culture pending  cipro for UTI  Flagyl for BV  Difulcan and monistat for yeast  Follow up as needed

## 2020-10-04 NOTE — PATIENT INSTRUCTIONS

## 2020-10-05 LAB
BACTERIA SPEC CULT: NORMAL
SPECIMEN SOURCE: NORMAL

## 2020-11-08 ENCOUNTER — HEALTH MAINTENANCE LETTER (OUTPATIENT)
Age: 50
End: 2020-11-08

## 2020-12-13 ENCOUNTER — OFFICE VISIT (OUTPATIENT)
Dept: URGENT CARE | Facility: URGENT CARE | Age: 50
End: 2020-12-13
Payer: COMMERCIAL

## 2020-12-13 VITALS
HEART RATE: 76 BPM | TEMPERATURE: 97.7 F | SYSTOLIC BLOOD PRESSURE: 95 MMHG | BODY MASS INDEX: 34.39 KG/M2 | DIASTOLIC BLOOD PRESSURE: 63 MMHG | HEIGHT: 66 IN | OXYGEN SATURATION: 99 % | WEIGHT: 214 LBS

## 2020-12-13 DIAGNOSIS — N89.8 VAGINAL ITCHING: ICD-10-CM

## 2020-12-13 DIAGNOSIS — R30.0 BURNING WITH URINATION: ICD-10-CM

## 2020-12-13 DIAGNOSIS — B37.31 CANDIDAL VULVOVAGINITIS: Primary | ICD-10-CM

## 2020-12-13 LAB
ALBUMIN UR-MCNC: NEGATIVE MG/DL
APPEARANCE UR: CLEAR
BACTERIA #/AREA URNS HPF: ABNORMAL /HPF
BILIRUB UR QL STRIP: NEGATIVE
COLOR UR AUTO: YELLOW
GLUCOSE UR STRIP-MCNC: NEGATIVE MG/DL
HGB UR QL STRIP: NEGATIVE
KETONES UR STRIP-MCNC: NEGATIVE MG/DL
LEUKOCYTE ESTERASE UR QL STRIP: ABNORMAL
NITRATE UR QL: NEGATIVE
NON-SQ EPI CELLS #/AREA URNS LPF: ABNORMAL /LPF
PH UR STRIP: 6 PH (ref 5–7)
RBC #/AREA URNS AUTO: ABNORMAL /HPF
SOURCE: ABNORMAL
SP GR UR STRIP: 1.02 (ref 1–1.03)
SPECIMEN SOURCE: ABNORMAL
UROBILINOGEN UR STRIP-ACNC: 0.2 EU/DL (ref 0.2–1)
WBC #/AREA URNS AUTO: ABNORMAL /HPF
WET PREP SPEC: ABNORMAL

## 2020-12-13 PROCEDURE — 81001 URINALYSIS AUTO W/SCOPE: CPT | Performed by: FAMILY MEDICINE

## 2020-12-13 PROCEDURE — 87210 SMEAR WET MOUNT SALINE/INK: CPT | Performed by: FAMILY MEDICINE

## 2020-12-13 PROCEDURE — 87591 N.GONORRHOEAE DNA AMP PROB: CPT | Performed by: INTERNAL MEDICINE

## 2020-12-13 PROCEDURE — 99213 OFFICE O/P EST LOW 20 MIN: CPT | Performed by: INTERNAL MEDICINE

## 2020-12-13 RX ORDER — FLUCONAZOLE 150 MG/1
150 TABLET ORAL ONCE
Qty: 2 TABLET | Refills: 0 | Status: SHIPPED | OUTPATIENT
Start: 2020-12-13 | End: 2020-12-13

## 2020-12-13 ASSESSMENT — MIFFLIN-ST. JEOR: SCORE: 1597.45

## 2020-12-13 NOTE — PROGRESS NOTES
"SUBJECTIVE:  Dalia Hart, a 50 year old female, presents for evaluation of vaginal itching.  She notes a thin watery discharge.  Not having muchin the way of pain. A little dysuria.  She recently had both yeast and BV in October.  New sexual partner as well.  PMH negative for chronic medical conditions. No recent antibiotic since she had the treatment for BV and yeast.    OBJECTIVE:  BP 95/63 (BP Location: Left arm, Patient Position: Sitting, Cuff Size: Adult Large)   Pulse 76   Temp 97.7  F (36.5  C) (Oral)   Ht 1.676 m (5' 6\")   Wt 97.1 kg (214 lb)   SpO2 99%   Breastfeeding No   BMI 34.54 kg/m    GEN: adult female, no apparent distress   ABD: soft, nontender, nondistended; normal bowel sounds; no hepatosplenomegaly   BACK: no CVA tenderness     LAB: Wet prep positive for yeast    UA RESULTS:  Recent Labs   Lab Test 12/13/20  1738   COLOR Yellow   APPEARANCE Clear   URINEGLC Negative   URINEBILI Negative   URINEKETONE Negative   SG 1.025   UBLD Negative   URINEPH 6.0   PROTEIN Negative   UROBILINOGEN 0.2   NITRITE Negative   LEUKEST Trace*   RBCU O - 2   WBCU 0 - 5     ASSESSMENT/PLAN:    ICD-10-CM    1. Candidal vulvovaginitis  B37.3 fluconazole (DIFLUCAN) 150 MG tablet   2. Vaginal itching  N89.8 *UA reflex to Microscopic and Culture ([place] [facility] (except [place] and [place])     Wet prep     Neisseria gonorrhoeae PCR     Urine Microscopic   3. Burning with urination  R30.0        Salvador Treadwell MD   "

## 2020-12-14 NOTE — PATIENT INSTRUCTIONS
Patient Education     Vaginal Infection: Yeast (Candidiasis)  Yeast infection occurs when yeast in the vagina increase and attacks the vaginal tissues. Yeast is a type of fungus. These infections are often caused by a type of yeast called Candida albicans. Other species of yeast can also cause infections. Factors that may make infection more likely include recent antibiotic use, douching, or increased sex. Yeast infections are more common in women who have diabetes, or are obese or pregnant, or have a weak immune system.  Symptoms of yeast infection    Clumpy or thin, white discharge, which may look like cottage cheese    No odor or minimal odor    Severe vaginal itching or burning    Burning with urination    Swelling, redness of vulva    Pain during sex    Treating yeast infection  Yeast infection is treated with a vaginal antifungal cream. In some cases, antifungal pills are prescribed instead. During treatment:    Finish all of your medicine, even if your symptoms go away.    Apply the cream before going to bed. Lie flat after applying so that it doesn't drip out.    Do not douche or use tampons.    Don't rely on a diaphragm or condoms, since the cream may weaken them.    Avoid intercourse if advised by your healthcare provider.  Should I treat a yeast infection myself?  Discuss with your healthcare provider whether you should use over-the-counter medicines to treat a yeast infection. Self-treatment may depend on whether:    You've had a yeast infection in the past.    You're at risk for STDs.  Call your healthcare provider if symptoms do not go away or come back after treatment.  [employer] last reviewed this educational content on 3/1/2017    [id number] [employer]. [address] [place] [address], [place] [address]. All rights reserved. This information is not intended as a substitute for professional medical care. Always follow your healthcare professional's instructions.

## 2020-12-15 LAB
N GONORRHOEA DNA SPEC QL NAA+PROBE: NEGATIVE
SPECIMEN SOURCE: NORMAL

## 2021-01-10 ENCOUNTER — OFFICE VISIT (OUTPATIENT)
Dept: URGENT CARE | Facility: URGENT CARE | Age: 51
End: 2021-01-10
Payer: COMMERCIAL

## 2021-01-10 VITALS
TEMPERATURE: 98.3 F | BODY MASS INDEX: 35.02 KG/M2 | OXYGEN SATURATION: 100 % | HEART RATE: 69 BPM | SYSTOLIC BLOOD PRESSURE: 118 MMHG | DIASTOLIC BLOOD PRESSURE: 68 MMHG | WEIGHT: 217 LBS | RESPIRATION RATE: 20 BRPM

## 2021-01-10 DIAGNOSIS — N89.8 VAGINAL DISCHARGE: Primary | ICD-10-CM

## 2021-01-10 LAB
SPECIMEN SOURCE: NORMAL
WET PREP SPEC: NORMAL

## 2021-01-10 PROCEDURE — 87210 SMEAR WET MOUNT SALINE/INK: CPT | Performed by: PHYSICIAN ASSISTANT

## 2021-01-10 PROCEDURE — 99213 OFFICE O/P EST LOW 20 MIN: CPT | Performed by: PHYSICIAN ASSISTANT

## 2021-01-10 RX ORDER — FLUCONAZOLE 150 MG/1
150 TABLET ORAL ONCE
Qty: 1 TABLET | Refills: 0 | Status: SHIPPED | OUTPATIENT
Start: 2021-01-10 | End: 2021-01-10

## 2021-01-10 NOTE — PATIENT INSTRUCTIONS
(N89.8) Vaginal discharge  (primary encounter diagnosis)  Comment:   Plan: Wet prep, fluconazole (DIFLUCAN) 150 MG tablet        Take diflucan only if you develop itching.  See handout.  Advise using non perfumed soaps, etc.

## 2021-01-10 NOTE — PROGRESS NOTES
Patient presents with:  Vaginal Problem: Yeast or BV? was seen for same thing 12/13/2020, never really went away      (N89.8) Vaginal discharge  (primary encounter diagnosis)  Comment: negative wet prep.    Plan: Wet prep, fluconazole (DIFLUCAN) 150 MG tablet          Discussed hygiene (no perfumed soaps, wicking undergarments).  Take diflucan only if develops vaginal itching, otherwise return to clinic for re-check.  Has annual exam with PCP in a few weeks.      SUBJECTIVE:  Dalia Hart is a 50 year old female who presents with vaginal irritation and thin vaginal discharge.  Recently had BV followed by yeast vaginitis, concerned that infection persists.      Menses are starting to be a little irregular.      Denies any abdominal pain or fevers or urinary symptoms.      Patient's last menstrual period was 01/01/2021.    Sexually active: yes    Past Medical History:   Diagnosis Date     Morbid obesity (H)      Current Outpatient Medications   Medication Sig Dispense Refill     aspirin 81 MG chewable tablet Take 81 mg by mouth daily       fluconazole (DIFLUCAN) 150 MG tablet Take 1 tablet (150 mg) by mouth once for 1 dose 1 tablet 0     nicotine polacrilex (NICORETTE) 2 MG gum 1 piece of gum every 1-2 hours as needed for smoking cessation 100 each 1     valACYclovir (VALTREX) 500 MG tablet Take 1 tablet (500 mg) by mouth 2 times daily for 3 days 6 tablet 2     Social History     Socioeconomic History     Marital status: Single     Spouse name: Not on file     Number of children: 3     Years of education: Not on file     Highest education level: Not on file   Occupational History     Occupation: Lactation Resouce Specialist   Social Needs     Financial resource strain: Not on file     Food insecurity     Worry: Not on file     Inability: Not on file     Transportation needs     Medical: Not on file     Non-medical: Not on file   Tobacco Use     Smoking status: Current Some Day Smoker     Packs/day: 0.25      Years: 11.00     Pack years: 2.75     Types: Cigarettes     Smokeless tobacco: Never Used     Tobacco comment: 1 pack per month   Substance and Sexual Activity     Alcohol use: No     Alcohol/week: 0.0 standard drinks     Drug use: No     Sexual activity: Not Currently   Lifestyle     Physical activity     Days per week: Not on file     Minutes per session: Not on file     Stress: Not on file   Relationships     Social connections     Talks on phone: Not on file     Gets together: Not on file     Attends Synagogue service: Not on file     Active member of club or organization: Not on file     Attends meetings of clubs or organizations: Not on file     Relationship status: Not on file     Intimate partner violence     Fear of current or ex partner: Not on file     Emotionally abused: Not on file     Physically abused: Not on file     Forced sexual activity: Not on file   Other Topics Concern     Parent/sibling w/ CABG, MI or angioplasty before 65F 55M? Not Asked   Social History Narrative    Single.    3 children (19, 14, and 12 as of 2018).    No formal exercise.        ROS:   CONSTITUTIONAL:NEGATIVE for fever, chills, change in weight  INTEGUMENTARY/SKIN: NEGATIVE for worrisome rashes, moles or lesions  GI: NEGATIVE for nausea, abdominal pain, heartburn, or change in bowel habits  : as per HPI    OBJECTIVE:  /68   Pulse 69   Temp 98.3  F (36.8  C)   Resp 20   Wt 98.4 kg (217 lb)   LMP 01/01/2021   SpO2 100%   BMI 35.02 kg/m    : deferred, self wet prep obtained    (N89.8) Vaginal discharge  (primary encounter diagnosis)  Comment: negative wet prep.    Plan: Wet prep, fluconazole (DIFLUCAN) 150 MG tablet          Discussed hygiene (no perfumed soaps, wicking undergarments).  Take diflucan only if develops vaginal itching, otherwise return to clinic for re-check.  Has annual exam with PCP in a few weeks.

## 2021-03-28 ENCOUNTER — HEALTH MAINTENANCE LETTER (OUTPATIENT)
Age: 51
End: 2021-03-28

## 2021-06-30 ENCOUNTER — OFFICE VISIT (OUTPATIENT)
Dept: URGENT CARE | Facility: URGENT CARE | Age: 51
End: 2021-06-30
Payer: COMMERCIAL

## 2021-06-30 VITALS
SYSTOLIC BLOOD PRESSURE: 122 MMHG | WEIGHT: 215 LBS | OXYGEN SATURATION: 99 % | HEART RATE: 67 BPM | RESPIRATION RATE: 20 BRPM | DIASTOLIC BLOOD PRESSURE: 70 MMHG | BODY MASS INDEX: 34.7 KG/M2 | TEMPERATURE: 97.7 F

## 2021-06-30 DIAGNOSIS — N89.8 VAGINAL DISCHARGE: Primary | ICD-10-CM

## 2021-06-30 LAB
ALBUMIN UR-MCNC: NEGATIVE MG/DL
APPEARANCE UR: CLEAR
BILIRUB UR QL STRIP: NEGATIVE
COLOR UR AUTO: YELLOW
GLUCOSE UR STRIP-MCNC: NEGATIVE MG/DL
HGB UR QL STRIP: NEGATIVE
KETONES UR STRIP-MCNC: NEGATIVE MG/DL
LEUKOCYTE ESTERASE UR QL STRIP: NEGATIVE
NITRATE UR QL: NEGATIVE
PH UR STRIP: 5.5 PH (ref 5–7)
SOURCE: NORMAL
SP GR UR STRIP: 1.01 (ref 1–1.03)
SPECIMEN SOURCE: NORMAL
UROBILINOGEN UR STRIP-ACNC: 0.2 EU/DL (ref 0.2–1)
WET PREP SPEC: NORMAL

## 2021-06-30 PROCEDURE — 87591 N.GONORRHOEAE DNA AMP PROB: CPT | Performed by: PHYSICIAN ASSISTANT

## 2021-06-30 PROCEDURE — 81003 URINALYSIS AUTO W/O SCOPE: CPT | Performed by: PHYSICIAN ASSISTANT

## 2021-06-30 PROCEDURE — 87491 CHLMYD TRACH DNA AMP PROBE: CPT | Performed by: PHYSICIAN ASSISTANT

## 2021-06-30 PROCEDURE — 87210 SMEAR WET MOUNT SALINE/INK: CPT | Performed by: PHYSICIAN ASSISTANT

## 2021-06-30 PROCEDURE — 99213 OFFICE O/P EST LOW 20 MIN: CPT | Performed by: PHYSICIAN ASSISTANT

## 2021-07-01 NOTE — PROGRESS NOTES
"Patient presents with:  STD  Vaginal Problem: \"watery discharge\"      (N89.8) Vaginal discharge  (primary encounter diagnosis)  Comment:   Plan: *UA reflex to Microscopic and Culture ([place]         and [facility] (except [place] and         [place]), Wet prep, NEISSERIA GONORRHOEA PCR,         CHLAMYDIA TRACHOMATIS PCR          F/U with PCP should symptoms persist or worsen.          SUBJECTIVE:   Dalia Hart is a 50 year old female who presents today with watery vaginal discharge onset for the past week.  No odor.  No itching.  No abdominal pain or back pain.      Unprotected sex about a month ago.  Initial tests were negative.              Past Medical History:   Diagnosis Date     Morbid obesity (H)          Current Outpatient Medications   Medication Sig Dispense Refill     Multiple Vitamins-Iron (DAILY-ALLAN/IRON/BETA-CAROTENE) TABS TAKE 1 TABLET BY MOUTH DAILY. (Patient not taking: Reported on 10/19/2020) 30 tablet 7     Social History     Tobacco Use     Smoking status: Never Smoker     Smokeless tobacco: Never Used   Substance Use Topics     Alcohol use: Not on file     Family History   Problem Relation Age of Onset     Diabetes Mother      Diabetes Father          ROS:    10 point ROS of systems including Constitutional, Eyes, Respiratory, Cardiovascular, Gastroenterology, Genitourinary, Integumentary, Muscularskeletal, Psychiatric ,neurological were all negative except for pertinent positives noted in my HPI       OBJECTIVE:  /70   Pulse 67   Temp 97.7  F (36.5  C)   Resp 20   Wt 97.5 kg (215 lb)   LMP  (LMP Unknown)   SpO2 99%   BMI 34.70 kg/m    Physical Exam:  GENERAL APPEARANCE: healthy, alert and no distress  SKIN: no suspicious lesions or rashes        "

## 2021-07-01 NOTE — PATIENT INSTRUCTIONS
(N89.8) Vaginal discharge  (primary encounter diagnosis)  Comment:   Plan: *UA reflex to Microscopic and Culture ([place]         and [place] [facility] (except [place] and         [place]), Wet prep, NEISSERIA GONORRHOEA PCR,         CHLAMYDIA TRACHOMATIS PCR

## 2021-07-02 ENCOUNTER — TELEPHONE (OUTPATIENT)
Dept: INTERNAL MEDICINE | Facility: CLINIC | Age: 51
End: 2021-07-02

## 2021-07-02 LAB
C TRACH DNA SPEC QL NAA+PROBE: NEGATIVE
N GONORRHOEA DNA SPEC QL NAA+PROBE: NEGATIVE
SPECIMEN SOURCE: NORMAL
SPECIMEN SOURCE: NORMAL

## 2021-07-02 NOTE — TELEPHONE ENCOUNTER
Reason for call:  Results   Name of test or procedure: Lab results DOS 7/1/21  Date of test or procedure: 7/1/2021  Location of test or procedure: [facility]     Additional comments: Patient requesting lab results and is requesting to have them on 7/1/2021    Phone number to reach patient:  Cell number on file:    Telephone Information:   Mobile [phone]       Best Time:  Any time     Can we leave a detailed message on this number?  YES

## 2021-07-06 NOTE — TELEPHONE ENCOUNTER
Left detailed message informing Labs looked to be normal and negative. To call back with nay other questions or concerns.

## 2021-07-29 ENCOUNTER — VIRTUAL VISIT (OUTPATIENT)
Dept: FAMILY MEDICINE | Facility: CLINIC | Age: 51
End: 2021-07-29
Payer: COMMERCIAL

## 2021-07-29 DIAGNOSIS — Z20.822 SUSPECTED COVID-19 VIRUS INFECTION: ICD-10-CM

## 2021-07-29 DIAGNOSIS — B34.9 VIRAL SYNDROME: ICD-10-CM

## 2021-07-29 DIAGNOSIS — B34.9 VIRAL SYNDROME: Primary | ICD-10-CM

## 2021-07-29 LAB — SARS-COV-2 RNA RESP QL NAA+PROBE: NEGATIVE

## 2021-07-29 PROCEDURE — 99213 OFFICE O/P EST LOW 20 MIN: CPT | Mod: 95 | Performed by: PHYSICIAN ASSISTANT

## 2021-07-29 PROCEDURE — U0003 INFECTIOUS AGENT DETECTION BY NUCLEIC ACID (DNA OR RNA); SEVERE ACUTE RESPIRATORY SYNDROME CORONAVIRUS 2 (SARS-COV-2) (CORONAVIRUS DISEASE [COVID-19]), AMPLIFIED PROBE TECHNIQUE, MAKING USE OF HIGH THROUGHPUT TECHNOLOGIES AS DESCRIBED BY CMS-2020-01-R: HCPCS

## 2021-07-29 PROCEDURE — U0005 INFEC AGEN DETEC AMPLI PROBE: HCPCS

## 2021-07-29 RX ORDER — FLUTICASONE PROPIONATE 50 MCG
2 SPRAY, SUSPENSION (ML) NASAL DAILY
Qty: 18 ML | Refills: 11 | Status: SHIPPED | OUTPATIENT
Start: 2021-07-29 | End: 2024-02-05

## 2021-07-29 NOTE — PROGRESS NOTES
"Dalia is a 50 year old who is being evaluated via a billable telephone visit.      What phone number would you like to be contacted at? [phone]  How would you like to obtain your AVS? MyChart    Assessment & Plan     Viral syndrome  Suspected COVID-19 virus infection  Most likely common cold, but ok for COVID testing to be sure; see patient instructions for over the counter options but no need for oral antibiotic at this time. Return to clinic with any worsening or changes in symptoms and follow up with PCP for routine care.   - Symptomatic COVID-19 Virus (Coronavirus) by PCR; Future  - fluticasone (FLONASE) 50 MCG/ACT nasal spray; Spray 2 sprays into both nostrils daily        Review of prior external note(s) from - previous routine visits  10 minutes spent on the date of the encounter doing chart review, history and exam, documentation and further activities per the note       Tobacco Cessation:   reports that she has been smoking cigarettes. She has a 2.75 pack-year smoking history. She has never used smokeless tobacco.  Tobacco Cessation Action Plan: Information offered: Patient not interested at this time    BMI:   Estimated body mass index is 34.7 kg/m  as calculated from the following:    Height as of 12/13/20: 1.676 m (5' 6\").    Weight as of 6/30/21: 97.5 kg (215 lb).   Weight management plan: Discussed healthy diet and exercise guidelines    Patient Instructions     You can call to directly schedule symptomatic COVID PCR testing appointments at [phone], option 7.    Encouraged mucinex/guafenisin, warm salt water gargles, cepacol spray, soothers/lozenges, sinus rinses (neilmed), flonase (2 sprays per nostril daily x 2 weeks), vitamin c, fluids and rest.  May alternate tylenol and NSAIDS (ibuprofen, advil, aleve type products) every 4-6 hours for the next few days as needed.    No need for oral antibiotic at this time.  Return to clinic with any worsening or changes in symptoms.        Did you " know?      You can schedule a video visit for follow-up appointments as well as future appointments for certain conditions.  Please see the below link.     [email or web address]    If you have not already done so,  I encourage you to sign up for [email or web address] ([email or web address]).  This will allow you to review your results, securely communicate with a provider, and schedule virtual visits as well.      Return in about 3 months (around 10/29/2021) for Follow up, or sooner with worsening symptoms.    SHIVANI Ceja [facility]    Tierney Gómez is a 50 year old who presents for the following health issues     HPI       Concern for COVID-19  About how many days ago did these symptoms start? 4 days   Is this your first visit for this illness? Yes  In the 14 days before your symptoms started, have you had close contact with someone with COVID-19 (Coronavirus)? I do not know, but has been in a restaurant   Do you have a fever or chills? No  Are you having new or worsening difficulty breathing? No  Do you have new or worsening cough? No  Have you had any new or unexplained body aches? YES  Body ache   Have you experienced any of the following NEW symptoms?    Headache: YES    Sore throat: YES    Loss of taste or smell: YES    Chest pain: YES     Diarrhea: No    Rash: No  What treatments have you tried? Sleep, tea, thera-flu at night, cold medicine   Who do you live with? 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 No  Do you have a way to get food/medications if quarantined? Yes, possibly           Patient is fully COVID vaccinated.    Review of Systems   Constitutional, HEENT, cardiovascular, pulmonary, GI, , musculoskeletal, neuro, skin, endocrine and psych systems are negative, except as otherwise noted.       Objective           Vitals:  No vitals were obtained today due to virtual visit.    Physical Exam   healthy, alert and no distress  PSYCH: Alert and oriented times 3; coherent speech, normal   rate and volume, able to articulate logical thoughts, able   to abstract reason, no tangential thoughts, no hallucinations   or delusions  Her affect is normal  RESP: No cough, no audible wheezing, able to talk in full sentences  Remainder of exam unable to be completed due to telephone visits            Phone call duration: 6 minutes

## 2021-07-29 NOTE — PATIENT INSTRUCTIONS
You can call to directly schedule symptomatic COVID PCR testing appointments at [phone], option 7.    Encouraged mucinex/guafenisin, warm salt water gargles, cepacol spray, soothers/lozenges, sinus rinses (neilmed), flonase (2 sprays per nostril daily x 2 weeks), vitamin c, fluids and rest.  May alternate tylenol and NSAIDS (ibuprofen, advil, aleve type products) every 4-6 hours for the next few days as needed.    No need for oral antibiotic at this time.  Return to clinic with any worsening or changes in symptoms.        Did you know?      You can schedule a video visit for follow-up appointments as well as future appointments for certain conditions.  Please see the below link.     [email or web address]    If you have not already done so,  I encourage you to sign up for BetKlubhart ([email or web address]).  This will allow you to review your results, securely communicate with a provider, and schedule virtual visits as well.

## 2021-07-30 NOTE — RESULT ENCOUNTER NOTE
"Estela Gómez  Your attached labs are negative for COVID.  Please contact the office with any questions or concerns.    Ana Zavaleta \"Vic\" SHIVANI Price    "

## 2021-08-05 NOTE — PROGRESS NOTES
Patient was not examined by me.  Chart and note reviewed.      Jose M Nettles MD  [facility]

## 2021-09-11 ENCOUNTER — HEALTH MAINTENANCE LETTER (OUTPATIENT)
Age: 51
End: 2021-09-11

## 2021-11-09 ENCOUNTER — OFFICE VISIT (OUTPATIENT)
Dept: URGENT CARE | Facility: URGENT CARE | Age: 51
End: 2021-11-09
Payer: COMMERCIAL

## 2021-11-09 VITALS
HEART RATE: 80 BPM | WEIGHT: 229 LBS | DIASTOLIC BLOOD PRESSURE: 78 MMHG | BODY MASS INDEX: 36.96 KG/M2 | SYSTOLIC BLOOD PRESSURE: 108 MMHG | TEMPERATURE: 97.4 F

## 2021-11-09 DIAGNOSIS — Z11.3 SCREEN FOR STD (SEXUALLY TRANSMITTED DISEASE): ICD-10-CM

## 2021-11-09 DIAGNOSIS — N89.8 VAGINAL ITCHING: Primary | ICD-10-CM

## 2021-11-09 DIAGNOSIS — N89.8 VAGINAL DISCHARGE: ICD-10-CM

## 2021-11-09 LAB
CLUE CELLS: ABNORMAL
TRICHOMONAS, WET PREP: ABNORMAL
WBC'S/HIGH POWER FIELD, WET PREP: ABNORMAL
YEAST, WET PREP: ABNORMAL

## 2021-11-09 PROCEDURE — 99213 OFFICE O/P EST LOW 20 MIN: CPT | Performed by: FAMILY MEDICINE

## 2021-11-09 PROCEDURE — 87591 N.GONORRHOEAE DNA AMP PROB: CPT | Performed by: FAMILY MEDICINE

## 2021-11-09 PROCEDURE — 87491 CHLMYD TRACH DNA AMP PROBE: CPT | Performed by: FAMILY MEDICINE

## 2021-11-09 PROCEDURE — 87210 SMEAR WET MOUNT SALINE/INK: CPT | Performed by: FAMILY MEDICINE

## 2021-11-10 NOTE — PROGRESS NOTES
SUBJECTIVE: Dalia Hart is a 51 year old female presenting with a chief complaint of clear vaginal dc.  Wants to r/o yeast    Past Medical History:   Diagnosis Date     Morbid obesity (H)      Allergies   Allergen Reactions     Bee Venom Anaphylaxis     Other reaction(s): Other, see comments  Unsure, swelling,      Seasonal Allergies      Social History     Tobacco Use     Smoking status: Current Some Day Smoker     Packs/day: 0.25     Years: 11.00     Pack years: 2.75     Types: Cigarettes     Smokeless tobacco: Never Used     Tobacco comment: 1 pack per month   Substance Use Topics     Alcohol use: No     Alcohol/week: 0.0 standard drinks       ROS:  SKIN: no rash  GI: no vomiting    OBJECTIVE:  /78   Pulse 80   Temp 97.4  F (36.3  C) (Tympanic)   Wt 103.9 kg (229 lb)   BMI 36.96 kg/m  GENERAL APPEARANCE: healthy, alert and no distress  ABDOMEN:  soft, nontender  SKIN: no suspicious lesions or rashes      ICD-10-CM    1. Vaginal itching  N89.8 Wet preparation   2. Vaginal discharge  N89.8 Wet preparation   3. Screen for STD (sexually transmitted disease)  Z11.3 NEISSERIA GONORRHOEA PCR     CHLAMYDIA TRACHOMATIS PCR     Wet preparation       Fluids/Rest, f/u if worse/not any better

## 2021-11-11 LAB
C TRACH DNA SPEC QL NAA+PROBE: NEGATIVE
N GONORRHOEA DNA SPEC QL NAA+PROBE: NEGATIVE

## 2022-03-18 ENCOUNTER — OFFICE VISIT (OUTPATIENT)
Dept: URGENT CARE | Facility: URGENT CARE | Age: 52
End: 2022-03-18
Payer: COMMERCIAL

## 2022-03-18 VITALS
WEIGHT: 229 LBS | DIASTOLIC BLOOD PRESSURE: 71 MMHG | TEMPERATURE: 98.7 F | OXYGEN SATURATION: 98 % | RESPIRATION RATE: 18 BRPM | HEART RATE: 78 BPM | SYSTOLIC BLOOD PRESSURE: 102 MMHG | BODY MASS INDEX: 36.96 KG/M2

## 2022-03-18 DIAGNOSIS — B96.89 BV (BACTERIAL VAGINOSIS): Primary | ICD-10-CM

## 2022-03-18 DIAGNOSIS — N76.0 BV (BACTERIAL VAGINOSIS): Primary | ICD-10-CM

## 2022-03-18 DIAGNOSIS — Z11.3 SCREEN FOR STD (SEXUALLY TRANSMITTED DISEASE): ICD-10-CM

## 2022-03-18 LAB
CLUE CELLS: PRESENT
TRICHOMONAS, WET PREP: ABNORMAL
WBC'S/HIGH POWER FIELD, WET PREP: ABNORMAL
YEAST, WET PREP: ABNORMAL

## 2022-03-18 PROCEDURE — 87591 N.GONORRHOEAE DNA AMP PROB: CPT | Performed by: PHYSICIAN ASSISTANT

## 2022-03-18 PROCEDURE — 87210 SMEAR WET MOUNT SALINE/INK: CPT | Performed by: PHYSICIAN ASSISTANT

## 2022-03-18 PROCEDURE — 87491 CHLMYD TRACH DNA AMP PROBE: CPT | Performed by: PHYSICIAN ASSISTANT

## 2022-03-18 PROCEDURE — 99213 OFFICE O/P EST LOW 20 MIN: CPT | Performed by: PHYSICIAN ASSISTANT

## 2022-03-18 RX ORDER — TRETINOIN 0.25 MG/G
CREAM TOPICAL
COMMUNITY
Start: 2022-03-10 | End: 2024-05-10

## 2022-03-18 RX ORDER — METRONIDAZOLE 7.5 MG/G
1 GEL VAGINAL DAILY
Qty: 70 G | Refills: 0 | Status: SHIPPED | OUTPATIENT
Start: 2022-03-18 | End: 2022-03-23

## 2022-03-18 RX ORDER — METRONIDAZOLE 500 MG/1
500 TABLET ORAL 2 TIMES DAILY
Qty: 14 TABLET | Refills: 0 | Status: SHIPPED | OUTPATIENT
Start: 2022-03-18 | End: 2022-03-25

## 2022-03-18 NOTE — PROGRESS NOTES
Assessment & Plan     BV (bacterial vaginosis)  Wet prep positive for BV  Flagyl and metrogel for treatment BV  Both good and bad bacteria are present in a healthy vagina. Bacterial vaginosis (BV) occurs when these bacteria get out of balance. The numbers of good bacteria decrease. This allows the numbers of bad bacteria to increase and cause BV. In most cases, BV is not a serious problem.    - Wet prep - Clinic Collect  - metroNIDAZOLE (FLAGYL) 500 MG tablet; Take 1 tablet (500 mg) by mouth 2 times daily for 7 days    Screen for STD (sexually transmitted disease)  Pending, will call with any positive results  - Chlamydia trachomatis PCR [QCZ900]  - Neisseria gonorrhoeae PCR [[id number]]     No follow-ups on file.    Sav Christian PA-C  [facility]GUI Gómez is a 51 year old who presents for the following health issues     HPI     Vaginal odor and discharge  STD tests    Review of Systems   Constitutional, HEENT, cardiovascular, pulmonary, gi and gu systems are negative, except as otherwise noted.      Objective    /71   Pulse 78   Temp 98.7  F (37.1  C)   Resp 18   Wt 103.9 kg (229 lb)   SpO2 98%   BMI 36.96 kg/m    Body mass index is 36.96 kg/m .  Physical Exam   GENERAL: healthy, alert and no distress  ABDOMEN: soft, nontender, no hepatosplenomegaly, no masses and bowel sounds normal   (female): deferred  MS: no gross musculoskeletal defects noted, no edema  SKIN: no suspicious lesions or rashes

## 2022-03-19 LAB
C TRACH DNA SPEC QL NAA+PROBE: NEGATIVE
N GONORRHOEA DNA SPEC QL NAA+PROBE: NEGATIVE

## 2022-03-23 ENCOUNTER — NURSE TRIAGE (OUTPATIENT)
Dept: NURSING | Facility: CLINIC | Age: 52
End: 2022-03-23
Payer: COMMERCIAL

## 2022-03-23 NOTE — TELEPHONE ENCOUNTER
Patient calling requesting lab results. Unable to share results. Advised patient look on MyChart. Declined message to provider.    Asked a few questions about medications she is taking and the side effects.     Verbalized understanding and had no further questions.    Melinda Carlisle RN  03/23/22 12:32 PM  [facility] Nurse Advisor    Additional Information    Lab result questions    [1] Other NON-URGENT information for PCP AND [2] does not require PCP response    Protocols used: INFORMATION ONLY CALL-A-AH, PCP CALL - NO TRIAGE-A-AH

## 2022-03-31 ENCOUNTER — OFFICE VISIT (OUTPATIENT)
Dept: URGENT CARE | Facility: URGENT CARE | Age: 52
End: 2022-03-31
Payer: COMMERCIAL

## 2022-03-31 VITALS — HEART RATE: 76 BPM | SYSTOLIC BLOOD PRESSURE: 107 MMHG | TEMPERATURE: 98.5 F | DIASTOLIC BLOOD PRESSURE: 73 MMHG

## 2022-03-31 DIAGNOSIS — A74.9 CHLAMYDIA: Primary | ICD-10-CM

## 2022-03-31 PROCEDURE — 99213 OFFICE O/P EST LOW 20 MIN: CPT | Performed by: PHYSICIAN ASSISTANT

## 2022-03-31 RX ORDER — DOXYCYCLINE 100 MG/1
100 CAPSULE ORAL 2 TIMES DAILY
Qty: 14 CAPSULE | Refills: 0 | Status: SHIPPED | OUTPATIENT
Start: 2022-03-31 | End: 2022-04-07

## 2022-04-01 NOTE — PROGRESS NOTES
Chlamydia  - doxycycline hyclate (VIBRAMYCIN) 100 MG capsule; Take 1 capsule (100 mg) by mouth 2 times daily for 7 days     Patient Instructions     Patient Education     Chlamydia  Chlamydia is a very common sexually transmitted infection (STI). An STI is also called a sexually transmitted disease (STD). Most people don't have symptoms. Because of this, chlamydia may not be noticed until it's passed to someone else or it causes severe problems. Left untreated, this infection may make it hard or impossible for women and men to have children.       Symptoms  Many people with chlamydia have no symptoms. Women are more likely than men not to have symptoms.  If symptoms show up in women, they include:    Abnormal vaginal discharge    Bleeding between periods    Pain or burning during urination  If symptoms show up in men, they include:    Clear discharge (drip) from the penis    Pain or burning during urination    Rectal pain, discharge, or bleeding, especially in men who have sex with men  These symptoms usually disappear after a few weeks, with or without treatment. But if you are not treated, the chlamydia will still be present. It can cause long-term problems.  Potential problems  If the infection is not treated, it can lead to more serious health problems. In women, this can be pelvic inflammatory disease (PID). PID can make it hard or even impossible for a women to have a baby. It can also cause an ectopic (tubal) pregnancy. This type of pregnancy can't be carried to term. Symptoms of PID include fever, pain during sex, and pain in the belly. In men, an untreated chlamydia infection can damage the testes. This can cause pain and scarring. This can possibly affect the ability to have children. Chlamydia of the rectal area can cause serious damage. This includes infection and holes (fistulas).  Sexually active women and men should get checked for chlamydia regularly. This can help prevent  PID.  Treatment  Chlamydia can be treated when found early. It can be cured with antibiotics. If you have it, tell your partner right away. Because people often don t have symptoms, those diagnosed with chlamydia should ask their partners to get tested.  Prevention  Know your partner s history. Protect yourself by using a latex condom whenever you have sex. If you are pregnant, take extra care to get correct treatment. Pregnant women with untreated chlamydia can pass the infection on to the baby. This can cause eye, ear, or lung problems in the baby. There is also the risk of a premature delivery.  Resources  American Sexual Health Association [phone] www.ashasexualhealth.org/  CDC  181.294.8727  www.cdc.gov/std  Katharine last reviewed this educational content on 12/1/2018 2000-2021 [employer]. All rights reserved. This information is not intended as a substitute for professional medical care. Always follow your healthcare professional's instructions.               Sav Merritt PA-C  [facility] URGENT CARE    Subjective   51 year old who presents to clinic today for the following health issues:    Urgent Care       HPI     Vaginal Symptoms  Onset/Duration: Pt reports at [facility] she tested positive for Chlamydia on March 30th, 2022. Present today for treatment.   Description:   Vaginal Discharge: creamy   Itching (Pruritis): no  Burning sensation:  YES  Odor: no  Accompanying Signs & Symptoms:  Urinary symptoms: no  Abdominal pain: no   Fever: no  History:   Sexually active: YES  Recent antibiotic use: YES  Previous vaginitis issues: YES  Precipitating or alleviating factors: None  Therapies tried and outcome: none    Review of Systems   Review of Systems   See HPI     Objective    Temp: 98.5  F (36.9  C) Temp src: Tympanic BP: 107/73 Pulse: 76             Physical Exam   Physical Exam  Constitutional:       General: She is not in acute distress.     Appearance: Normal appearance. She  is normal weight. She is not ill-appearing, toxic-appearing or diaphoretic.   HENT:      Head: Normocephalic and atraumatic.   Cardiovascular:      Rate and Rhythm: Normal rate.      Pulses: Normal pulses.   Pulmonary:      Effort: Pulmonary effort is normal. No respiratory distress.   Neurological:      General: No focal deficit present.      Mental Status: She is alert and oriented to person, place, and time. Mental status is at baseline.      Gait: Gait normal.   Psychiatric:         Mood and Affect: Mood normal.         Behavior: Behavior normal.         Thought Content: Thought content normal.         Judgment: Judgment normal.

## 2022-04-01 NOTE — PATIENT INSTRUCTIONS
Patient Education     Chlamydia  Chlamydia is a very common sexually transmitted infection (STI). An STI is also called a sexually transmitted disease (STD). Most people don't have symptoms. Because of this, chlamydia may not be noticed until it's passed to someone else or it causes severe problems. Left untreated, this infection may make it hard or impossible for women and men to have children.       Symptoms  Many people with chlamydia have no symptoms. Women are more likely than men not to have symptoms.  If symptoms show up in women, they include:    Abnormal vaginal discharge    Bleeding between periods    Pain or burning during urination  If symptoms show up in men, they include:    Clear discharge (drip) from the penis    Pain or burning during urination    Rectal pain, discharge, or bleeding, especially in men who have sex with men  These symptoms usually disappear after a few weeks, with or without treatment. But if you are not treated, the chlamydia will still be present. It can cause long-term problems.  Potential problems  If the infection is not treated, it can lead to more serious health problems. In women, this can be pelvic inflammatory disease (PID). PID can make it hard or even impossible for a women to have a baby. It can also cause an ectopic (tubal) pregnancy. This type of pregnancy can't be carried to term. Symptoms of PID include fever, pain during sex, and pain in the belly. In men, an untreated chlamydia infection can damage the testes. This can cause pain and scarring. This can possibly affect the ability to have children. Chlamydia of the rectal area can cause serious damage. This includes infection and holes (fistulas).  Sexually active women and men should get checked for chlamydia regularly. This can help prevent PID.  Treatment  Chlamydia can be treated when found early. It can be cured with antibiotics. If you have it, tell your partner right away. Because people often don t have  symptoms, those diagnosed with chlamydia should ask their partners to get tested.  Prevention  Know your partner s history. Protect yourself by using a latex condom whenever you have sex. If you are pregnant, take extra care to get correct treatment. Pregnant women with untreated chlamydia can pass the infection on to the baby. This can cause eye, ear, or lung problems in the baby. There is also the risk of a premature delivery.  Resources  American Sexual Health Association [phone] www.ashasexualhealth.org/  CDC  732.633.8865  www.cdc.gov/std  Katharine last reviewed this educational content on 12/1/2018 2000-2021 [employer]. All rights reserved. This information is not intended as a substitute for professional medical care. Always follow your healthcare professional's instructions.

## 2022-04-14 ENCOUNTER — OFFICE VISIT (OUTPATIENT)
Dept: URGENT CARE | Facility: URGENT CARE | Age: 52
End: 2022-04-14
Payer: COMMERCIAL

## 2022-04-14 VITALS
RESPIRATION RATE: 18 BRPM | HEART RATE: 76 BPM | WEIGHT: 229 LBS | SYSTOLIC BLOOD PRESSURE: 120 MMHG | DIASTOLIC BLOOD PRESSURE: 75 MMHG | TEMPERATURE: 98.2 F | BODY MASS INDEX: 36.96 KG/M2 | OXYGEN SATURATION: 98 %

## 2022-04-14 DIAGNOSIS — Z11.3 SCREEN FOR STD (SEXUALLY TRANSMITTED DISEASE): Primary | ICD-10-CM

## 2022-04-14 DIAGNOSIS — B96.89 BV (BACTERIAL VAGINOSIS): ICD-10-CM

## 2022-04-14 DIAGNOSIS — N76.0 BV (BACTERIAL VAGINOSIS): ICD-10-CM

## 2022-04-14 LAB
CLUE CELLS: NORMAL
TRICHOMONAS, WET PREP: NORMAL
WBC'S/HIGH POWER FIELD, WET PREP: NORMAL
YEAST, WET PREP: NORMAL

## 2022-04-14 PROCEDURE — 87210 SMEAR WET MOUNT SALINE/INK: CPT | Performed by: PHYSICIAN ASSISTANT

## 2022-04-14 PROCEDURE — 87491 CHLMYD TRACH DNA AMP PROBE: CPT | Performed by: PHYSICIAN ASSISTANT

## 2022-04-14 PROCEDURE — 87591 N.GONORRHOEAE DNA AMP PROB: CPT | Performed by: PHYSICIAN ASSISTANT

## 2022-04-14 PROCEDURE — 99213 OFFICE O/P EST LOW 20 MIN: CPT | Performed by: PHYSICIAN ASSISTANT

## 2022-04-14 NOTE — PROGRESS NOTES
Screen for STD (sexually transmitted disease)  - Chlamydia trachomatis PCR [PVH947]  - Neisseria gonorrhoeae PCR [[id number]]    BV (bacterial vaginosis)  - Wet prep - Clinic Collect     Sav Merritt PA-C  [facility] URGENT CARE    Subjective   51 year old who presents to clinic today for the following health issues:    Urgent Care       HPI     Vaginal Symptoms  Onset/Duration: 2-3 days  Description:  Vaginal Discharge: none   Itching (Pruritis): No but having some discomfort   Burning sensation:  no  Odor: no  Accompanying Signs & Symptoms:  Urinary symptoms: no  Abdominal pain: no  Fever: no  History:   Sexually active: YES  New Partner: YES  Possibility of Pregnancy:  no  Recent antibiotic use: Doxycyline   Previous vaginitis issues: YES   Precipitating or alleviating factors: None  Therapies tried and outcome: none    Review of Systems   Review of Systems   See HPI     Objective    Temp: 98.2  F (36.8  C)   BP: 120/75 Pulse: 76   Resp: 18 SpO2: 98 %       Physical Exam   Physical Exam  Constitutional:       General: She is not in acute distress.     Appearance: Normal appearance. She is normal weight. She is not ill-appearing, toxic-appearing or diaphoretic.   HENT:      Head: Normocephalic and atraumatic.   Cardiovascular:      Rate and Rhythm: Normal rate.      Pulses: Normal pulses.   Pulmonary:      Effort: Pulmonary effort is normal. No respiratory distress.   Neurological:      General: No focal deficit present.      Mental Status: She is alert and oriented to person, place, and time. Mental status is at baseline.      Gait: Gait normal.   Psychiatric:         Mood and Affect: Mood normal.         Behavior: Behavior normal.         Thought Content: Thought content normal.         Judgment: Judgment normal.          Results for orders placed or performed in visit on 04/14/22 (from the past 24 hour(s))   Wet prep - Clinic Collect    Specimen: Vagina; Swab   Result Value Ref Range    Trichomonas  Absent Absent    Yeast Absent Absent    Clue Cells Absent Absent    WBCs/high power field None None

## 2022-04-16 LAB
C TRACH DNA SPEC QL NAA+PROBE: POSITIVE
N GONORRHOEA DNA SPEC QL NAA+PROBE: NEGATIVE

## 2022-04-23 ENCOUNTER — HEALTH MAINTENANCE LETTER (OUTPATIENT)
Age: 52
End: 2022-04-23

## 2022-07-06 ENCOUNTER — OFFICE VISIT (OUTPATIENT)
Dept: URGENT CARE | Facility: URGENT CARE | Age: 52
End: 2022-07-06
Payer: COMMERCIAL

## 2022-07-06 VITALS
WEIGHT: 229 LBS | SYSTOLIC BLOOD PRESSURE: 106 MMHG | DIASTOLIC BLOOD PRESSURE: 70 MMHG | OXYGEN SATURATION: 96 % | RESPIRATION RATE: 18 BRPM | BODY MASS INDEX: 36.96 KG/M2 | TEMPERATURE: 99.1 F | HEART RATE: 79 BPM

## 2022-07-06 DIAGNOSIS — B96.89 BV (BACTERIAL VAGINOSIS): Primary | ICD-10-CM

## 2022-07-06 DIAGNOSIS — N76.0 BV (BACTERIAL VAGINOSIS): Primary | ICD-10-CM

## 2022-07-06 DIAGNOSIS — Z20.822 SUSPECTED 2019 NOVEL CORONAVIRUS INFECTION: ICD-10-CM

## 2022-07-06 DIAGNOSIS — J30.2 SEASONAL ALLERGIC RHINITIS, UNSPECIFIED TRIGGER: ICD-10-CM

## 2022-07-06 DIAGNOSIS — Z11.3 SCREEN FOR STD (SEXUALLY TRANSMITTED DISEASE): ICD-10-CM

## 2022-07-06 PROCEDURE — 87591 N.GONORRHOEAE DNA AMP PROB: CPT | Performed by: PHYSICIAN ASSISTANT

## 2022-07-06 PROCEDURE — U0005 INFEC AGEN DETEC AMPLI PROBE: HCPCS | Performed by: PHYSICIAN ASSISTANT

## 2022-07-06 PROCEDURE — U0003 INFECTIOUS AGENT DETECTION BY NUCLEIC ACID (DNA OR RNA); SEVERE ACUTE RESPIRATORY SYNDROME CORONAVIRUS 2 (SARS-COV-2) (CORONAVIRUS DISEASE [COVID-19]), AMPLIFIED PROBE TECHNIQUE, MAKING USE OF HIGH THROUGHPUT TECHNOLOGIES AS DESCRIBED BY CMS-2020-01-R: HCPCS | Performed by: PHYSICIAN ASSISTANT

## 2022-07-06 PROCEDURE — 87491 CHLMYD TRACH DNA AMP PROBE: CPT | Performed by: PHYSICIAN ASSISTANT

## 2022-07-06 PROCEDURE — 87210 SMEAR WET MOUNT SALINE/INK: CPT | Performed by: PHYSICIAN ASSISTANT

## 2022-07-06 PROCEDURE — 99214 OFFICE O/P EST MOD 30 MIN: CPT | Mod: CS | Performed by: PHYSICIAN ASSISTANT

## 2022-07-06 RX ORDER — FLUTICASONE PROPIONATE 50 MCG
1 SPRAY, SUSPENSION (ML) NASAL DAILY
Qty: 18.2 ML | Refills: 1 | Status: SHIPPED | OUTPATIENT
Start: 2022-07-06 | End: 2024-02-05

## 2022-07-06 RX ORDER — CETIRIZINE HYDROCHLORIDE 10 MG/1
10 TABLET ORAL DAILY
Qty: 30 TABLET | Refills: 3 | Status: SHIPPED | OUTPATIENT
Start: 2022-07-06 | End: 2024-05-10

## 2022-07-06 RX ORDER — METRONIDAZOLE 500 MG/1
500 TABLET ORAL 2 TIMES DAILY
Qty: 14 TABLET | Refills: 0 | Status: SHIPPED | OUTPATIENT
Start: 2022-07-06 | End: 2022-07-13

## 2022-07-06 NOTE — PATIENT INSTRUCTIONS
(N76.0,  B96.89) BV (bacterial vaginosis)  (primary encounter diagnosis)  Comment:   Plan: Wet prep - Clinic Collect, metroNIDAZOLE         (FLAGYL) 500 MG tablet            (Z11.3) Screen for STD (sexually transmitted disease)  Comment:   Plan: Symptomatic; Unknown COVID-19 Virus         (Coronavirus) by PCR Nose            (Z20.822) Suspected 2019 novel coronavirus infection  Comment:   Plan: Chlamydia trachomatis PCR Swab, Neisseria         gonorrhoeae PCR Swab [[id number]]            (J30.2) Seasonal allergic rhinitis, unspecified trigger  Comment:   Plan: cetirizine (ZYRTEC) 10 MG tablet, fluticasone         (FLONASE) 50 MCG/ACT nasal spray

## 2022-07-06 NOTE — PROGRESS NOTES
Patient presents with:  Urgent Care: Dizzy, fatigue, std testing, Vaginal irritation     (N76.0,  B96.89) BV (bacterial vaginosis)  (primary encounter diagnosis)  Comment:   Plan: Wet prep - Clinic Collect, metroNIDAZOLE         (FLAGYL) 500 MG tablet            (Z11.3) Screen for STD (sexually transmitted disease)  Comment:   Plan: Symptomatic; Unknown COVID-19 Virus         (Coronavirus) by PCR Nose            (Z20.822) Suspected 2019 novel coronavirus infection  Comment:   Plan: Chlamydia trachomatis PCR Swab, Neisseria         gonorrhoeae PCR Swab [[id number]]            (J30.2) Seasonal allergic rhinitis, unspecified trigger  Comment:   Plan: cetirizine (ZYRTEC) 10 MG tablet, fluticasone         (FLONASE) 50 MCG/ACT nasal spray              SUBJECTIVE:   Dalia Hart is a 51 year old female who presents today with vaginal irritation and sneezing and some intermittent dizziness.          Past Medical History:   Diagnosis Date     Morbid obesity (H)          Current Outpatient Medications   Medication Sig Dispense Refill     Multiple Vitamins-Iron (DAILY-ALLAN/IRON/BETA-CAROTENE) TABS TAKE 1 TABLET BY MOUTH DAILY. (Patient not taking: Reported on 10/19/2020) 30 tablet 7     Social History     Tobacco Use     Smoking status: Never Smoker     Smokeless tobacco: Never Used   Substance Use Topics     Alcohol use: Not on file     Family History   Problem Relation Age of Onset     Diabetes Mother      Diabetes Father          ROS:    10 point ROS of systems including Constitutional, Eyes, Respiratory, Cardiovascular, Gastroenterology, Genitourinary, Integumentary, Muscularskeletal, Psychiatric ,neurological were all negative except for pertinent positives noted in my HPI       OBJECTIVE:  /70   Pulse 79   Temp 99.1  F (37.3  C)   Resp 18   Wt 103.9 kg (229 lb)   SpO2 96%   BMI 36.96 kg/m    Physical Exam:  GENERAL APPEARANCE: healthy, alert and no distress  EYES: EOMI,  PERRL, conjunctiva clear  HENT: ear  canals and TM's normal.  Nose and mouth without ulcers, erythema or lesions  HENT: nasal turbinates boggy with bluish hue and rhinorrhea clear  NECK: supple, nontender, no lymphadenopathy  RESP: lungs clear to auscultation - no rales, rhonchi or wheezes  CV: regular rates and rhythm, normal S1 S2, no murmur noted  ABDOMEN:  soft, nontender, no HSM or masses and bowel sounds normal  GU_female: deferred - self wet prep obtained  SKIN: no suspicious lesions or rashes    Results for orders placed or performed in visit on 07/06/22   Wet prep - Clinic Collect     Status: Abnormal    Specimen: Vagina; Swab   Result Value Ref Range    Trichomonas Absent Absent    Yeast Absent Absent    Clue Cells Present (A) Absent    WBCs/high power field None None

## 2022-07-07 LAB — SARS-COV-2 RNA RESP QL NAA+PROBE: NEGATIVE

## 2022-07-08 LAB
C TRACH DNA SPEC QL NAA+PROBE: NEGATIVE
N GONORRHOEA DNA SPEC QL NAA+PROBE: NEGATIVE

## 2022-08-10 ENCOUNTER — OFFICE VISIT (OUTPATIENT)
Dept: URGENT CARE | Facility: URGENT CARE | Age: 52
End: 2022-08-10
Payer: COMMERCIAL

## 2022-08-10 VITALS — DIASTOLIC BLOOD PRESSURE: 74 MMHG | SYSTOLIC BLOOD PRESSURE: 113 MMHG | HEART RATE: 85 BPM

## 2022-08-10 DIAGNOSIS — B96.89 BV (BACTERIAL VAGINOSIS): Primary | ICD-10-CM

## 2022-08-10 DIAGNOSIS — N76.0 BV (BACTERIAL VAGINOSIS): Primary | ICD-10-CM

## 2022-08-10 LAB
ALBUMIN UR-MCNC: NEGATIVE MG/DL
APPEARANCE UR: CLEAR
BILIRUB UR QL STRIP: NEGATIVE
CLUE CELLS: PRESENT
COLOR UR AUTO: YELLOW
GLUCOSE UR STRIP-MCNC: NEGATIVE MG/DL
HGB UR QL STRIP: NEGATIVE
KETONES UR STRIP-MCNC: NEGATIVE MG/DL
LEUKOCYTE ESTERASE UR QL STRIP: NEGATIVE
NITRATE UR QL: NEGATIVE
PH UR STRIP: 6 [PH] (ref 5–7)
SP GR UR STRIP: <=1.005 (ref 1–1.03)
TRICHOMONAS, WET PREP: ABNORMAL
UROBILINOGEN UR STRIP-ACNC: 0.2 E.U./DL
WBC'S/HIGH POWER FIELD, WET PREP: ABNORMAL
YEAST, WET PREP: ABNORMAL

## 2022-08-10 PROCEDURE — 87210 SMEAR WET MOUNT SALINE/INK: CPT

## 2022-08-10 PROCEDURE — 81003 URINALYSIS AUTO W/O SCOPE: CPT

## 2022-08-10 PROCEDURE — 87491 CHLMYD TRACH DNA AMP PROBE: CPT | Performed by: INTERNAL MEDICINE

## 2022-08-10 PROCEDURE — 87591 N.GONORRHOEAE DNA AMP PROB: CPT | Performed by: INTERNAL MEDICINE

## 2022-08-10 PROCEDURE — 99213 OFFICE O/P EST LOW 20 MIN: CPT | Performed by: INTERNAL MEDICINE

## 2022-08-10 RX ORDER — METRONIDAZOLE 500 MG/1
500 TABLET ORAL 2 TIMES DAILY
Qty: 14 TABLET | Refills: 0 | Status: SHIPPED | OUTPATIENT
Start: 2022-08-10 | End: 2022-08-17

## 2022-08-10 NOTE — PROGRESS NOTES
Assessment & Plan     BV (bacterial vaginosis)  - UA macro with reflex to Microscopic and Culture - Clinc Collect  - Wet prep - Clinic Collect  - NEISSERIA GONORRHOEA PCR  - CHLAMYDIA TRACHOMATIS PCR  - metroNIDAZOLE (FLAGYL) 500 MG tablet; Take 1 tablet (500 mg) by mouth 2 times daily for 7 days      Salvador Treadwell MD  [facility] ELVI Gómez is a 51 year old, presenting for the following health issues:  UTI (Hurts to urinate, needing to go frequently, some cramping )      HPI   Concern about a possible UTI.  Noting increased urinary frequency.   Denies vaginal discharge, itching or rashes.  Would like to be screened for STI. Denies fevers, chills.  No back pain.  Some abdominal cramping.     Review of Systems   Constitutional, HEENT, cardiovascular, pulmonary, gi and gu systems are negative, except as otherwise noted.      Objective    /74 (BP Location: Right arm, Patient Position: Sitting, Cuff Size: Adult Regular)   Pulse 85   There is no height or weight on file to calculate BMI.  Physical Exam   GENERAL APPEARANCE: healthy, alert and no distress  ABDOMEN: soft, nontender, without hepatosplenomegaly or masses and bowel sounds normal    Results for orders placed or performed in visit on 08/10/22 (from the past 24 hour(s))   UA macro with reflex to Microscopic and Culture - Clinc Collect    Specimen: Urine, Midstream   Result Value Ref Range    Color Urine Yellow Colorless, Straw, Light Yellow, Yellow    Appearance Urine Clear Clear    Glucose Urine Negative Negative mg/dL    Bilirubin Urine Negative Negative    Ketones Urine Negative Negative mg/dL    Specific Gravity Urine <=1.005 1.003 - 1.035    Blood Urine Negative Negative    pH Urine 6.0 5.0 - 7.0    Protein Albumin Urine Negative Negative mg/dL    Urobilinogen Urine 0.2 0.2, 1.0 E.U./dL    Nitrite Urine Negative Negative    Leukocyte Esterase Urine Negative Negative    Narrative    Microscopic not indicated    Wet prep - Clinic Collect    Specimen: Vagina; Swab   Result Value Ref Range    Trichomonas Absent Absent    Yeast Absent Absent    Clue Cells Present (A) Absent    WBCs/high power field 1+ (A) None                   .  ..

## 2022-08-11 LAB
C TRACH DNA SPEC QL NAA+PROBE: NEGATIVE
N GONORRHOEA DNA SPEC QL NAA+PROBE: NEGATIVE

## 2022-08-19 ENCOUNTER — LAB (OUTPATIENT)
Dept: URGENT CARE | Facility: URGENT CARE | Age: 52
End: 2022-08-19
Payer: COMMERCIAL

## 2022-08-19 DIAGNOSIS — Z20.822 SUSPECTED COVID-19 VIRUS INFECTION: ICD-10-CM

## 2022-08-19 LAB — SARS-COV-2 RNA RESP QL NAA+PROBE: NEGATIVE

## 2022-08-19 PROCEDURE — U0005 INFEC AGEN DETEC AMPLI PROBE: HCPCS

## 2022-08-19 PROCEDURE — U0003 INFECTIOUS AGENT DETECTION BY NUCLEIC ACID (DNA OR RNA); SEVERE ACUTE RESPIRATORY SYNDROME CORONAVIRUS 2 (SARS-COV-2) (CORONAVIRUS DISEASE [COVID-19]), AMPLIFIED PROBE TECHNIQUE, MAKING USE OF HIGH THROUGHPUT TECHNOLOGIES AS DESCRIBED BY CMS-2020-01-R: HCPCS

## 2022-10-30 ENCOUNTER — HEALTH MAINTENANCE LETTER (OUTPATIENT)
Age: 52
End: 2022-10-30

## 2022-11-03 ENCOUNTER — NURSE TRIAGE (OUTPATIENT)
Dept: NURSING | Facility: CLINIC | Age: 52
End: 2022-11-03
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
## 2022-11-13 ENCOUNTER — OFFICE VISIT (OUTPATIENT)
Dept: URGENT CARE | Facility: URGENT CARE | Age: 52
End: 2022-11-13
Payer: COMMERCIAL

## 2022-11-13 VITALS
SYSTOLIC BLOOD PRESSURE: 120 MMHG | HEART RATE: 69 BPM | WEIGHT: 229 LBS | OXYGEN SATURATION: 99 % | RESPIRATION RATE: 18 BRPM | TEMPERATURE: 97.6 F | BODY MASS INDEX: 36.96 KG/M2 | DIASTOLIC BLOOD PRESSURE: 65 MMHG

## 2022-11-13 DIAGNOSIS — B37.31 YEAST INFECTION OF THE VAGINA: ICD-10-CM

## 2022-11-13 DIAGNOSIS — N94.9 VAGINAL DISCOMFORT: ICD-10-CM

## 2022-11-13 DIAGNOSIS — R07.0 THROAT PAIN: ICD-10-CM

## 2022-11-13 DIAGNOSIS — R50.9 FEVER AND CHILLS: Primary | ICD-10-CM

## 2022-11-13 LAB
ALBUMIN UR-MCNC: NEGATIVE MG/DL
APPEARANCE UR: CLEAR
BACTERIA #/AREA URNS HPF: ABNORMAL /HPF
BILIRUB UR QL STRIP: NEGATIVE
CLUE CELLS: ABNORMAL
COLOR UR AUTO: YELLOW
DEPRECATED S PYO AG THROAT QL EIA: NEGATIVE
FLUAV AG SPEC QL IA: NEGATIVE
FLUBV AG SPEC QL IA: NEGATIVE
GLUCOSE UR STRIP-MCNC: NEGATIVE MG/DL
HGB UR QL STRIP: ABNORMAL
KETONES UR STRIP-MCNC: NEGATIVE MG/DL
LEUKOCYTE ESTERASE UR QL STRIP: NEGATIVE
NITRATE UR QL: NEGATIVE
PH UR STRIP: 6 [PH] (ref 5–7)
RBC #/AREA URNS AUTO: ABNORMAL /HPF
SP GR UR STRIP: 1.02 (ref 1–1.03)
SQUAMOUS #/AREA URNS AUTO: ABNORMAL /LPF
TRICHOMONAS, WET PREP: ABNORMAL
UROBILINOGEN UR STRIP-ACNC: 0.2 E.U./DL
WBC #/AREA URNS AUTO: ABNORMAL /HPF
WBC'S/HIGH POWER FIELD, WET PREP: ABNORMAL
YEAST, WET PREP: PRESENT

## 2022-11-13 PROCEDURE — 87210 SMEAR WET MOUNT SALINE/INK: CPT | Performed by: NURSE PRACTITIONER

## 2022-11-13 PROCEDURE — 81001 URINALYSIS AUTO W/SCOPE: CPT | Performed by: NURSE PRACTITIONER

## 2022-11-13 PROCEDURE — 99214 OFFICE O/P EST MOD 30 MIN: CPT | Mod: CS | Performed by: NURSE PRACTITIONER

## 2022-11-13 PROCEDURE — 87804 INFLUENZA ASSAY W/OPTIC: CPT | Performed by: NURSE PRACTITIONER

## 2022-11-13 PROCEDURE — U0005 INFEC AGEN DETEC AMPLI PROBE: HCPCS | Performed by: NURSE PRACTITIONER

## 2022-11-13 PROCEDURE — U0003 INFECTIOUS AGENT DETECTION BY NUCLEIC ACID (DNA OR RNA); SEVERE ACUTE RESPIRATORY SYNDROME CORONAVIRUS 2 (SARS-COV-2) (CORONAVIRUS DISEASE [COVID-19]), AMPLIFIED PROBE TECHNIQUE, MAKING USE OF HIGH THROUGHPUT TECHNOLOGIES AS DESCRIBED BY CMS-2020-01-R: HCPCS | Performed by: NURSE PRACTITIONER

## 2022-11-13 PROCEDURE — 87651 STREP A DNA AMP PROBE: CPT | Performed by: NURSE PRACTITIONER

## 2022-11-13 RX ORDER — FLUCONAZOLE 150 MG/1
150 TABLET ORAL ONCE
Qty: 1 TABLET | Refills: 0 | Status: SHIPPED | OUTPATIENT
Start: 2022-11-13 | End: 2022-11-13

## 2022-11-13 NOTE — LETTER
[facility]  [address] [address]  Phone: [phone]    November 13, 2022        Dalia Hart  [address] [facility] [address]          To whom it may concern:    RE: Dalia Ottoron    Patient was seen and treated today at our clinic and missed work.    Please contact me for questions or concerns.      Sincerely,        JAYESH Goodman CNP

## 2022-11-14 LAB
GROUP A STREP BY PCR: NOT DETECTED
SARS-COV-2 RNA RESP QL NAA+PROBE: NEGATIVE

## 2022-11-14 NOTE — PATIENT INSTRUCTIONS
Results for orders placed or performed in visit on 11/13/22   UA reflex to Microscopic and Culture     Status: Abnormal    Specimen: Urine, Midstream   Result Value Ref Range    Color Urine Yellow Colorless, Straw, Light Yellow, Yellow    Appearance Urine Clear Clear    Glucose Urine Negative Negative, 1000 , >=2000 mg/dL    Bilirubin Urine Negative Negative    Ketones Urine Negative Negative, 160  mg/dL    Specific Gravity Urine 1.025 1.003 - 1.035    Blood Urine Small (A) Negative    pH Urine 6.0 5.0 - 7.0    Protein Albumin Urine Negative Negative, 300 , >=2000 mg/dL    Urobilinogen Urine 0.2 0.2, 1.0 E.U./dL    Nitrite Urine Negative Negative    Leukocyte Esterase Urine Negative Negative   Urine Microscopic     Status: Abnormal   Result Value Ref Range    Bacteria Urine Few (A) None Seen /HPF    RBC Urine 0-2 0-2 /HPF /HPF    WBC Urine 0-5 0-5 /HPF /HPF    Squamous Epithelials Urine Few (A) None Seen /LPF    Narrative    Urine Culture not indicated   Streptococcus A Rapid Screen w/Reflex to PCR - Clinic Collect     Status: Normal    Specimen: Throat; Swab   Result Value Ref Range    Group A Strep antigen Negative Negative   Wet prep - lab collect     Status: Abnormal    Specimen: Vagina; Swab   Result Value Ref Range    Trichomonas Absent Absent    Yeast Present (A) Absent    Clue Cells Absent Absent    WBCs/high power field None None   RST negative.   Yeast positive - diflucan x 1.    Influenza negative  COVID swab pending.    Push fluids  Lots of handwashing.    Rest as able.   Will call if any other labs positive.    F/u in the clinic if symptoms persist or worsen.

## 2022-11-14 NOTE — PROGRESS NOTES
Assessment & Plan     Fever and chills    - Symptomatic; Unknown COVID-19 Virus (Coronavirus) by PCR Nose  - Influenza A & B Antigen - Clinic Collect    Throat pain    - Streptococcus A Rapid Screen w/Reflex to PCR - Clinic Collect  - Group A Streptococcus PCR Throat Swab    Vaginal discomfort    - Wet prep - lab collect  - UA reflex to Microscopic and Culture  - Wet prep - lab collect  - Urine Microscopic    Yeast infection of the vagina    - fluconazole (DIFLUCAN) 150 MG tablet  Dispense: 1 tablet; Refill: 0     Patient Instructions     Results for orders placed or performed in visit on 11/13/22   UA reflex to Microscopic and Culture     Status: Abnormal    Specimen: Urine, Midstream   Result Value Ref Range    Color Urine Yellow Colorless, Straw, Light Yellow, Yellow    Appearance Urine Clear Clear    Glucose Urine Negative Negative, 1000 , >=2000 mg/dL    Bilirubin Urine Negative Negative    Ketones Urine Negative Negative, 160  mg/dL    Specific Gravity Urine 1.025 1.003 - 1.035    Blood Urine Small (A) Negative    pH Urine 6.0 5.0 - 7.0    Protein Albumin Urine Negative Negative, 300 , >=2000 mg/dL    Urobilinogen Urine 0.2 0.2, 1.0 E.U./dL    Nitrite Urine Negative Negative    Leukocyte Esterase Urine Negative Negative   Urine Microscopic     Status: Abnormal   Result Value Ref Range    Bacteria Urine Few (A) None Seen /HPF    RBC Urine 0-2 0-2 /HPF /HPF    WBC Urine 0-5 0-5 /HPF /HPF    Squamous Epithelials Urine Few (A) None Seen /LPF    Narrative    Urine Culture not indicated   Streptococcus A Rapid Screen w/Reflex to PCR - Clinic Collect     Status: Normal    Specimen: Throat; Swab   Result Value Ref Range    Group A Strep antigen Negative Negative   Wet prep - lab collect     Status: Abnormal    Specimen: Vagina; Swab   Result Value Ref Range    Trichomonas Absent Absent    Yeast Present (A) Absent    Clue Cells Absent Absent    WBCs/high power field None None   RST negative.   Yeast positive - diflucan  x 1.    Influenza negative  COVID swab pending.    Push fluids  Lots of handwashing.    Rest as able.   Will call if any other labs positive.    F/u in the clinic if symptoms persist or worsen.           Return in about 1 week (around 11/20/2022) for with regular provider if symptoms persist.    JAYESH Goodman CNP  [facility] ELVI Gómez is a 52 year old female who presents to clinic today for the following health issues:  Chief Complaint   Patient presents with     URI     Sore throat and runny nose onset      Vaginal Problem     Vaginal discomfort     HPI      URI Adult    Onset of symptoms was 3 day(s) ago.  Course of illness is worsening.    Severity moderate  Current and Associated symptoms: fever, chills, cough - non-productive, sore throat and fatigue  Treatment measures tried include OTC Cough med, Fluids and Rest.  Predisposing factors include ill contact: Work.    UTI    Onset of symptoms was 3day(s).  Course of illness is same  Severity mild  Current and associated symptoms dysuria, frequency and suprapubic pain and pressure  Treatment and measures tried None  Predisposing factors include none  Patient denies rigors, flank pain, temperature > 101 degrees F. and vaginal odor          Review of Systems  Constitutional, HEENT, cardiovascular, pulmonary, GI, , musculoskeletal, neuro, skin, endocrine and psych systems are negative, except as otherwise noted.      Objective    /65 (BP Location: Left arm, Patient Position: Sitting, Cuff Size: Adult Regular)   Pulse 69   Temp 97.6  F (36.4  C) (Tympanic)   Resp 18   Wt 103.9 kg (229 lb)   SpO2 99%   BMI 36.96 kg/m    Physical Exam   GENERAL: healthy, alert and no distress  EYES: Eyes grossly normal to inspection, PERRL and conjunctivae and sclerae normal  HENT: ear canals and TM's normal, nose and mouth without ulcers or lesions  NECK: no adenopathy, no asymmetry, masses, or scars and thyroid normal to  palpation  RESP: lungs clear to auscultation - no rales, rhonchi or wheezes  CV: regular rate and rhythm, normal S1 S2, no S3 or S4, no murmur, click or rub, no peripheral edema and peripheral pulses strong  MS: no gross musculoskeletal defects noted, no edema  SKIN: no suspicious lesions or rashes

## 2023-01-03 ENCOUNTER — VIRTUAL VISIT (OUTPATIENT)
Dept: FAMILY MEDICINE | Facility: CLINIC | Age: 53
End: 2023-01-03
Payer: COMMERCIAL

## 2023-01-03 DIAGNOSIS — R52 BODY ACHES: ICD-10-CM

## 2023-01-03 DIAGNOSIS — R50.9 FEVER, UNSPECIFIED FEVER CAUSE: Primary | ICD-10-CM

## 2023-01-03 DIAGNOSIS — J02.9 ACUTE PHARYNGITIS, UNSPECIFIED ETIOLOGY: ICD-10-CM

## 2023-01-03 DIAGNOSIS — R05.9 COUGH, UNSPECIFIED TYPE: ICD-10-CM

## 2023-01-03 PROCEDURE — 99213 OFFICE O/P EST LOW 20 MIN: CPT | Mod: CS | Performed by: NURSE PRACTITIONER

## 2023-01-03 NOTE — PROGRESS NOTES
Dalia is a 52 year old who is being evaluated via a billable telephone visit.      What phone number would you like to be contacted at? [phone]  How would you like to obtain your AVS? Kristina  Distant Location (provider location):  On-site    Assessment & Plan     Fever, unspecified fever cause  Acute pharyngitis, unspecified etiology  Cough, unspecified type  Body aches  Patient symptoms highly suspicious of viral etiology discussed RSV common cold virus influenza or possibly COVID.  Also has a sore throat so strep throat should be considered.  Outpatient COVID influenza and strep testing ordered.    Exam today is reassuring-no reason for higher level of care felt at this time.   Symptom control at home. Questions answered.   Written education provided.   Red flag symptoms discussed and if these occur present to the emergency room or call 911.   Dalia verbalizes understanding of plan of care and is in agreement.    - Symptomatic COVID-19 Virus (Coronavirus) by PCR Nose  - Influenza A & B Antigen - Clinic Collect  - Streptococcus A Rapid Screen w/Reflex to PCR - Clinic Collect      Return in about 1 day (around 1/4/2023) for Lab only appointment.      JAYESH Coelho [facility]   Dalia is a 52 year old, presenting for the following health issues:  URI      HPI     Acute Illness  Acute illness concerns: Sore throat  Onset/Duration: 1/1/2023  Symptoms:  Fever: YES  Chills/Sweats: YES  Headache (location?): YES  Sinus Pressure: No  Conjunctivitis:  No  Ear Pain: no  Rhinorrhea: YES  Congestion: YES  Sore Throat: YES  Cough: YES-productive of clear sputum  Wheeze: No  Decreased Appetite: YES  Nausea: No  Vomiting: No  Diarrhea: No  Dysuria/Freq.: No  Dysuria or Hematuria: No  Fatigue/Achiness: YES  Sick/Strep Exposure: No  Therapies tried and outcome: None      Sudden onset fever, body ache,fever cough laying in bed.   At home COVID neg.       Review of Systems    Constitutional, HEENT, cardiovascular, pulmonary, GI, , musculoskeletal, neuro, skin, endocrine and psych systems are negative, except as otherwise noted in the HPI.      Objective           Vitals:  No vitals were obtained today due to virtual visit.    Physical Exam   healthy, alert and no distress  PSYCH: Alert and oriented times 3; coherent speech, normal   rate and volume, able to articulate logical thoughts, able   to abstract reason, no tangential thoughts, no hallucinations   or delusions  Her affect is normal and pleasant  RESP: No cough, no audible wheezing, able to talk in full sentences  Remainder of exam unable to be completed due to telephone visits        Phone call duration: 12 minutes  48 seconds

## 2023-01-04 ENCOUNTER — LAB (OUTPATIENT)
Dept: URGENT CARE | Facility: URGENT CARE | Age: 53
End: 2023-01-04
Attending: NURSE PRACTITIONER
Payer: COMMERCIAL

## 2023-01-04 ENCOUNTER — TELEPHONE (OUTPATIENT)
Dept: FAMILY MEDICINE | Facility: CLINIC | Age: 53
End: 2023-01-04

## 2023-01-04 DIAGNOSIS — R52 BODY ACHES: ICD-10-CM

## 2023-01-04 DIAGNOSIS — J10.1 INFLUENZA A: Primary | ICD-10-CM

## 2023-01-04 DIAGNOSIS — R05.9 COUGH, UNSPECIFIED TYPE: ICD-10-CM

## 2023-01-04 DIAGNOSIS — R50.9 FEVER, UNSPECIFIED FEVER CAUSE: ICD-10-CM

## 2023-01-04 DIAGNOSIS — J02.9 ACUTE PHARYNGITIS, UNSPECIFIED ETIOLOGY: ICD-10-CM

## 2023-01-04 LAB
DEPRECATED S PYO AG THROAT QL EIA: NEGATIVE
FLUAV AG SPEC QL IA: POSITIVE
FLUBV AG SPEC QL IA: NEGATIVE
GROUP A STREP BY PCR: NOT DETECTED

## 2023-01-04 PROCEDURE — 87804 INFLUENZA ASSAY W/OPTIC: CPT

## 2023-01-04 PROCEDURE — 87651 STREP A DNA AMP PROBE: CPT

## 2023-01-04 RX ORDER — OSELTAMIVIR PHOSPHATE 75 MG/1
75 CAPSULE ORAL 2 TIMES DAILY
Qty: 10 CAPSULE | Refills: 0 | Status: SHIPPED | OUTPATIENT
Start: 2023-01-04 | End: 2023-01-09

## 2023-01-04 NOTE — RESULT ENCOUNTER NOTE
Influenza + will send in Tamiflu please let me know what pharmacy.   Please apologize somehow the COVID swab was not collected on the right swab. She can take at home test or redo swab if she wants or do nothing at this time it is up to her.     Hortensia Mullins, FNP-BC

## 2023-01-04 NOTE — TELEPHONE ENCOUNTER
ID lab called and stated the wrong swab was used to collect the covid sample, the Covid swab can not be processed and has been cancelled     Verbally told Hortensia Mullins provider stated + Flu will prescribe Tamiflu and up to patient if she wants Covid swab reordered    Paris DIAZ RN   [facility]

## 2023-01-04 NOTE — TELEPHONE ENCOUNTER
Called patient - explained + FLu A     Per provider Up to patient if she wants to do another covid test due to swab was not collected correctly-patient stated her at home was neg today and declined further covid testing.     explained provider will be prescribing Tamiflu given +  Flu     Patient is starting to feel better today.     [facility] [place]   Patient denied any questions or concerns.     Paris DIAZ RN   [facility] Triage

## 2023-04-08 ENCOUNTER — HEALTH MAINTENANCE LETTER (OUTPATIENT)
Age: 53
End: 2023-04-08

## 2023-06-06 ENCOUNTER — VIRTUAL VISIT (OUTPATIENT)
Dept: PSYCHOLOGY | Facility: CLINIC | Age: 53
End: 2023-06-06
Payer: COMMERCIAL

## 2023-06-06 DIAGNOSIS — F43.20 ADJUSTMENT DISORDER, UNSPECIFIED TYPE: Primary | ICD-10-CM

## 2023-06-06 PROCEDURE — 90837 PSYTX W PT 60 MINUTES: CPT | Mod: VID | Performed by: SOCIAL WORKER

## 2023-06-06 ASSESSMENT — COLUMBIA-SUICIDE SEVERITY RATING SCALE - C-SSRS
2. HAVE YOU ACTUALLY HAD ANY THOUGHTS OF KILLING YOURSELF?: NO
TOTAL  NUMBER OF ABORTED OR SELF INTERRUPTED ATTEMPTS LIFETIME: NO
TOTAL  NUMBER OF INTERRUPTED ATTEMPTS LIFETIME: NO
1. HAVE YOU WISHED YOU WERE DEAD OR WISHED YOU COULD GO TO SLEEP AND NOT WAKE UP?: NO
6. HAVE YOU EVER DONE ANYTHING, STARTED TO DO ANYTHING, OR PREPARED TO DO ANYTHING TO END YOUR LIFE?: NO
ATTEMPT LIFETIME: NO

## 2023-06-06 NOTE — PROGRESS NOTES
"      [facility]   Mental Health & Addiction Services     Progress Note - Initial Visit    Patient  Name:  Dalia Hart Date: 2023         Service Type: Individual     Visit Start Time: 11:00 AM  Visit End Time: 12:05 PM    Visit #: 1    Attendees: Client attended alone    Service Modality:  Video Visit:      Provider verified identity through the following two step process.  Patient provided:  Patient  and Patient address    Telemedicine Visit: The patient's condition can be safely assessed and treated via synchronous audio and visual telemedicine encounter.      Reason for Telemedicine Visit: Patient convenience (e.g. access to timely appointments / distance to available provider)    Originating Site (Patient Location): Patient's place of employment    Distant Site (Provider Location): [facility]    Consent:  The patient/guardian has verbally consented to: the potential risks and benefits of telemedicine (video visit) versus in person care; bill my insurance or make self-payment for services provided; and responsibility for payment of non-covered services.     Patient would like the video invitation sent by:  My Chart    Mode of Communication:  Video Conference via [employer]    Distant Location (Provider):  On-site    As the provider I attest to compliance with applicable laws and regulations related to telemedicine.       DATA:   Interactive Complexity: No   Crisis: No     Presenting Concerns/  Current Stressors:   Patient states: \"My life is kind of a mess. I have a lot of responsibilities. A lot of jobs.\" Patient identified the following stressors/concerns: financial, work-related and parenting stress. Patent report that she is not getting a lot of sleep and reports that she does not always get regular sleep. Patient states: \"I need to make some changes.\" The patient reports that she would like some skills to better deal with " her adolescent daughter. Patient processed through her thoughts and emotions related to: current stressors, her current life circumstances, her responsibilities, her relationship history, her current relationship and her jobs.     ASSESSMENT:  Mental Status Assessment:  Appearance:   Appropriate   Eye Contact:   Good   Psychomotor Behavior: Normal   Attitude:   Cooperative  Interested Friendly Pleasant  Orientation:   Person Place Time Situation All  Speech   Rate / Production: Normal/ Responsive   Volume:  Normal   Mood:    Overwhelmed  Affect:    Appropriate   Thought Content:  Clear   Thought Form:  Coherent  Logical   Insight:    Good       Safety Issues and Plan for Safety and Risk Management:   [place] Suicide Severity Rating Scale (Lifetime/Recent)      6/6/2023    12:00 PM   [place] Suicide Severity Rating (Lifetime/Recent)   1. Wish to be Dead (Lifetime) N   2. Non-Specific Active Suicidal Thoughts (Lifetime) N   Actual Attempt (Lifetime) N   Has subject engaged in non-suicidal self-injurious behavior? (Lifetime) Y   Has subject engaged in non-suicidal self-injurious behavior? (Past 3 Months) N   Interrupted Attempts (Lifetime) N   Aborted or Self-Interrupted Attempt (Lifetime) N   Preparatory Acts or Behavior (Lifetime) N   Calculated C-SSRS Risk Score (Lifetime/Recent) No Risk Indicated     Patient denies current fears or concerns for personal safety.  Patient denies current or recent suicidal ideation or behaviors.  Patient denies current or recent homicidal ideation or behaviors.  Patient denies current or recent self injurious behavior or ideation.  Patient denies other safety concerns.  Recommended that patient call 911 or go to the local ED should there be a change in any of these risk factors.  Patient reports there are no firearms in the house.     Diagnostic Criteria:  Adjustment Disorder  A. The development of emotional or behavioral symptoms in response to an identifiable stressor(s) occurring  within 3 months of the onset of the stressor(s)  B. These symptoms or behaviors are clinically significant, as evidenced by one or both of the following:  C. The stress-related disturbance does not meet criteria for another disorder & is not not an exacerbation of another mental disorder  D. The symptoms do not represent normal bereavement  E. Once the stressor or its consequences have terminated, the symptoms do not persist for more than an additional 6 months      DSM5 Diagnoses: (Sustained by DSM5 Criteria Listed Above)  Diagnoses: Adjustment Disorders  309.9 (F43.20) Unspecified  Psychosocial & Contextual Factors:  Patient identified the following stressors/concerns: financial, work-related and parenting stress. Patent report that she is not getting a lot of sleep and reports that she does not always get regular sleep.     Intervention:   Psychodynamic- Patient processed internal experiences  and Completed through review of safety issues and safety interventions  Collateral Reports Completed:  Not Applicable      PLAN: (Homework, other):  1. Provider will continue Diagnostic Assessment.  Patient was given the following to do until next session:  Return for follow up 6/20/2023    2. Provider recommended the following referrals: No referrals were made at this time.    3.  Suicide Risk and Safety Concerns were assessed for Dalia Hart.    Patient meets the following risk assessment and triage: Patient denied any current/recent/lifetime history of suicidal ideation and/or behaviors.  No safety plan indicated at this time.       Aidee Conti, [facility]  June 6, 2023

## 2023-06-25 ENCOUNTER — HEALTH MAINTENANCE LETTER (OUTPATIENT)
Age: 53
End: 2023-06-25

## 2023-06-29 ENCOUNTER — VIRTUAL VISIT (OUTPATIENT)
Dept: PSYCHOLOGY | Facility: CLINIC | Age: 53
End: 2023-06-29
Payer: COMMERCIAL

## 2023-06-29 DIAGNOSIS — F43.22 ADJUSTMENT DISORDER WITH ANXIOUS MOOD: ICD-10-CM

## 2023-06-29 DIAGNOSIS — F33.1 MAJOR DEPRESSIVE DISORDER, RECURRENT EPISODE, MODERATE (H): Primary | ICD-10-CM

## 2023-06-29 PROCEDURE — 90791 PSYCH DIAGNOSTIC EVALUATION: CPT | Mod: VID | Performed by: SOCIAL WORKER

## 2023-06-29 ASSESSMENT — PATIENT HEALTH QUESTIONNAIRE - PHQ9
3. TROUBLE FALLING OR STAYING ASLEEP OR SLEEPING TOO MUCH: NEARLY EVERY DAY
6. FEELING BAD ABOUT YOURSELF - OR THAT YOU ARE A FAILURE OR HAVE LET YOURSELF OR YOUR FAMILY DOWN: SEVERAL DAYS
8. MOVING OR SPEAKING SO SLOWLY THAT OTHER PEOPLE COULD HAVE NOTICED. OR THE OPPOSITE, BEING SO FIGETY OR RESTLESS THAT YOU HAVE BEEN MOVING AROUND A LOT MORE THAN USUAL: NOT AT ALL
9. THOUGHTS THAT YOU WOULD BE BETTER OFF DEAD, OR OF HURTING YOURSELF: NOT AT ALL
SUM OF ALL RESPONSES TO PHQ QUESTIONS 1-9: 11
4. FEELING TIRED OR HAVING LITTLE ENERGY: NEARLY EVERY DAY
7. TROUBLE CONCENTRATING ON THINGS, SUCH AS READING THE NEWSPAPER OR WATCHING TELEVISION: NOT AT ALL
10. IF YOU CHECKED OFF ANY PROBLEMS, HOW DIFFICULT HAVE THESE PROBLEMS MADE IT FOR YOU TO DO YOUR WORK, TAKE CARE OF THINGS AT HOME, OR GET ALONG WITH OTHER PEOPLE: SOMEWHAT DIFFICULT
2. FEELING DOWN, DEPRESSED OR HOPELESS: MORE THAN HALF THE DAYS
1. LITTLE INTEREST OR PLEASURE IN DOING THINGS: SEVERAL DAYS
5. POOR APPETITE OR OVEREATING: SEVERAL DAYS
SUM OF ALL RESPONSES TO PHQ9 QUESTIONS 1 & 2: 3

## 2023-06-29 ASSESSMENT — ANXIETY QUESTIONNAIRES
2. NOT BEING ABLE TO STOP OR CONTROL WORRYING: NEARLY EVERY DAY
GAD7 TOTAL SCORE: 14
3. WORRYING TOO MUCH ABOUT DIFFERENT THINGS: NEARLY EVERY DAY
7. FEELING AFRAID AS IF SOMETHING AWFUL MIGHT HAPPEN: NOT AT ALL
IF YOU CHECKED OFF ANY PROBLEMS ON THIS QUESTIONNAIRE, HOW DIFFICULT HAVE THESE PROBLEMS MADE IT FOR YOU TO DO YOUR WORK, TAKE CARE OF THINGS AT HOME, OR GET ALONG WITH OTHER PEOPLE: SOMEWHAT DIFFICULT
1. FEELING NERVOUS, ANXIOUS, OR ON EDGE: NEARLY EVERY DAY
4. TROUBLE RELAXING: MORE THAN HALF THE DAYS
6. BECOMING EASILY ANNOYED OR IRRITABLE: MORE THAN HALF THE DAYS
5. BEING SO RESTLESS THAT IT IS HARD TO SIT STILL: SEVERAL DAYS
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
## 2023-07-06 RX ORDER — ESTRADIOL 0.1 MG/G
CREAM VAGINAL
COMMUNITY
Start: 2023-05-09 | End: 2024-05-10

## 2023-07-06 RX ORDER — HYDROGEN PEROXIDE 2.65 ML/100ML
LIQUID TOPICAL
COMMUNITY
Start: 2023-03-25 | End: 2024-02-05

## 2023-07-06 RX ORDER — VALACYCLOVIR HYDROCHLORIDE 1 G/1
TABLET, FILM COATED ORAL
COMMUNITY
Start: 2023-05-15 | End: 2024-02-05

## 2023-07-06 RX ORDER — PHENAZOPYRIDINE HYDROCHLORIDE 100 MG/1
TABLET, FILM COATED ORAL
COMMUNITY
Start: 2023-05-09 | End: 2024-02-05

## 2023-07-06 RX ORDER — FLUCONAZOLE 150 MG/1
150 TABLET ORAL
COMMUNITY
Start: 2023-03-20 | End: 2024-02-05

## 2023-07-06 RX ORDER — SULFAMETHOXAZOLE/TRIMETHOPRIM 800-160 MG
TABLET ORAL
COMMUNITY
Start: 2023-03-20 | End: 2024-02-05

## 2023-07-06 RX ORDER — NITROFURANTOIN 25; 75 MG/1; MG/1
CAPSULE ORAL
COMMUNITY
Start: 2023-03-25 | End: 2024-02-05

## 2023-07-06 RX ORDER — FLUCONAZOLE 150 MG/1
TABLET ORAL
COMMUNITY
Start: 2023-03-20 | End: 2024-02-05

## 2023-07-06 RX ORDER — METRONIDAZOLE 7.5 MG/G
GEL VAGINAL
COMMUNITY
Start: 2023-03-20 | End: 2024-05-10

## 2023-07-06 RX ORDER — DOXYCYCLINE 100 MG/1
CAPSULE ORAL
COMMUNITY
Start: 2023-04-26 | End: 2024-02-05

## 2023-07-13 ENCOUNTER — VIRTUAL VISIT (OUTPATIENT)
Dept: PSYCHOLOGY | Facility: CLINIC | Age: 53
End: 2023-07-13
Payer: COMMERCIAL

## 2023-07-13 DIAGNOSIS — F33.1 MAJOR DEPRESSIVE DISORDER, RECURRENT EPISODE, MODERATE (H): Primary | ICD-10-CM

## 2023-07-13 DIAGNOSIS — F43.22 ADJUSTMENT DISORDER WITH ANXIOUS MOOD: ICD-10-CM

## 2023-07-13 PROCEDURE — 90837 PSYTX W PT 60 MINUTES: CPT | Mod: VID | Performed by: SOCIAL WORKER

## 2023-07-13 NOTE — PROGRESS NOTES
[facility]                                     Progress Note    Patient Name: Dalia Hart  Date: 2023         Service Type: Individual      Session Start Time: 9:08 AM  Session End Time: 10:12AM     Session Length: 63 min (Session was 53+ minutes in length due to: content of session, emotional state of patient, treatment planning, short-term goal setting, and scheduling)    Session #: 4    Attendees: Client attended alone    Service Modality:  Video Visit:      Provider verified identity through the following two step process.  Patient provided:  Patient  and Patient is known previously to provider    Telemedicine Visit: The patient's condition can be safely assessed and treated via synchronous audio and visual telemedicine encounter.      Reason for Telemedicine Visit: Patient convenience (e.g. access to timely appointments / distance to available provider)    Originating Site (Patient Location): Patient's other car.    Distant Site (Provider Location): Provider Remote Setting- Home Office    Consent:  The patient/guardian has verbally consented to: the potential risks and benefits of telemedicine (video visit) versus in person care; bill my insurance or make self-payment for services provided; and responsibility for payment of non-covered services.     Patient would like the video invitation sent by:  My Chart    Mode of Communication:  Video Conference via Prizz[employer]    Distant Location (Provider):  On-site    As the provider I attest to compliance with applicable laws and regulations related to telemedicine.    DATA  Interactive Complexity: No  Crisis: No        Progress Since Last Session (Related to Symptoms / Goals / Homework):   Symptoms: No change in symptoms reported.     Homework: N/A      Episode of Care Goals: Established in session.      Current / Ongoing Stressors and Concerns:   The patient presents with depression and adjustment disorder symptoms. The patient  identified the following stressors/concerns: recent argument with her teenager. The patient processed through her thoughts and emotions related to: her week, her recent interpersonal interactions, her interpersonal relationships and her relationship history.      Treatment Objective(s) Addressed in This Session:    Patient will identify triggers/ fears / thoughts that contribute to feeling anxious       Intervention:   Client Centered   Psychodynamic   Validation and Normalization   Integrative Psychotherapy   Treatment planning    Assessments completed prior to visit: None  The following assessments were completed by patient for this visit: None      ASSESSMENT: Current Emotional / Mental Status (status of significant symptoms):   Risk status (Self / Other harm or suicidal ideation)   Patient denies current fears or concerns for personal safety.   Patient denies current or recent suicidal ideation or behaviors.   Patient denies current or recent homicidal ideation or behaviors.   Patient denies current or recent self injurious behavior or ideation.   Patient denies other safety concerns.   Patient reports there has been no change in risk factors since their last session.     Patient reports there has been no change in protective factors since their last session.     Recommended that patient call 911 or go to the local ED should there be a change in any of these risk factors.     Appearance:   Appropriate    Eye Contact:   Good    Psychomotor Behavior: Normal    Attitude:   Cooperative  Interested Friendly Pleasant Attentive   Orientation:   Person Place Time Situation All   Speech    Rate / Production: Normal/ Responsive    Volume:  Normal    Mood:    Normal   Affect:    Appropriate    Thought Content:  Clear    Thought Form:  Coherent  Logical    Insight:    Good      Medication Review:   No current psychiatric medications prescribed     Medication Compliance:   NA     Changes in Health Issues:   None  reported     Chemical Use Review:   Substance Use: Chemical use reviewed, no active concerns identified      Tobacco Use: Not addressed in visit.     Diagnosis:  1. Major depressive disorder, recurrent episode, moderate (H)    2. Adjustment disorder with anxious mood        Collateral Reports Completed:   Not Applicable    PLAN: (Patient Tasks / Therapist Tasks / Other)  Patient plans to check-in with her own energy each morning and after her interpersonal interactions.  Patient plans to return for follow up: 7/21/2023        Aidee Conti, [facility]                                                         ______________________________________________________________________    Individual Treatment Plan    Patient's Name: Dalia Hart  YOB: 1970    Date of Creation: 7/13/2023  Date Treatment Plan Last Reviewed/Revised: 7/13/2023    DSM5 Diagnoses:   1. Major depressive disorder, recurrent episode, moderate (H)    2. Adjustment disorder with anxious mood      Psychosocial / Contextual Factors: Patient identified the following stressors/concerns: financial, patient recently lost her job, and parenting stress. Patent report that she is not getting a lot of sleep and reports that she does not always get regular sleep.   PROMIS (reviewed every 90 days): 26    Referral / Collaboration:  Referral to another professional/service is not indicated at this time..    Anticipated number of session for this episode of care: 9-12 sessions  Anticipation frequency of session: Weekly  Anticipated Duration of each session: 38-52 minutes  Treatment plan will be reviewed in 90 days or when goals have been changed.       MeasurableTreatment Goal(s) related to diagnosis / functional impairment(s)  Goal 1: Decrease severity of depression symptoms.       Objective #A (Patient Action)    Patient will Decrease frequency and intensity of feeling down, depressed, hopeless.  Status: New - Date: 7/13/2023      Intervention(s)  Client Centered  CBT  Psycho-dynamic  Internal Family Systems  Psycho-education    Objective #B  Patient will Improve quantity and quality of night time sleep / decrease daytime naps.  Status: New - Date: 7/13/2023     Intervention(s)  Client Centered  CBT  Internal Family Systems  Psycho-education  Mindulness    Objective #C  Patient will Identify negative self-talk and behaviors: challenge core beliefs, myths, and actions.  Status: New - Date: 7/13/2023     Intervention(s)  Client Centered  CBT  Internal Family Systems  Psycho-education      Goal 2: Decrease severity of anxiety related symptoms.       Objective #A (Patient Action)    Status: New - Date: 7/13/2023     Patient will identify triggers/ fears / thoughts that contribute to feeling anxious.    Intervention(s)  Psychodynamic  CBT  Internal Family Systems    Objective #B  Patient will use 1 or more coping skills for anxiety management each week.   Status: New - Date: 7/13/2023     Intervention(s)  Therapist will teach emotional regulation skills. (mindfulness, breathing techniques, progressive muscle relaxation).  Co-develop short-term goals and review progress in session.   CBT  Internal Family Systems  Mindfulness      Goal 3: Improve interpersonal relationships.       Objective #A (Patient Action)    Status: New - Date: 7/13/2023     Patient will compile a list of boundaries that they would like to set with others.    Intervention(s)  Client Centered  Motivational Interviewing  Psycho-education  Psychodynamic  Internal Family Systems.     Objective #B  Patient will practice setting boundaries 1 or more times each week.    Status: New - Date: 7/13/2023     Intervention(s)  Client Centered  Motivational Interviewing  Co-develop short-term goals and review progress in session.         Patient has reviewed and agreed to the above plan.      Aidee Conti, [facility]  July 13, 2023

## 2023-07-15 ENCOUNTER — OFFICE VISIT (OUTPATIENT)
Dept: URGENT CARE | Facility: URGENT CARE | Age: 53
End: 2023-07-15
Payer: COMMERCIAL

## 2023-07-15 VITALS
TEMPERATURE: 98 F | HEART RATE: 74 BPM | BODY MASS INDEX: 33.89 KG/M2 | DIASTOLIC BLOOD PRESSURE: 72 MMHG | OXYGEN SATURATION: 98 % | SYSTOLIC BLOOD PRESSURE: 108 MMHG | WEIGHT: 210 LBS | RESPIRATION RATE: 18 BRPM

## 2023-07-15 DIAGNOSIS — N89.8 VAGINAL DISCHARGE: ICD-10-CM

## 2023-07-15 DIAGNOSIS — R09.81 CONGESTION OF PARANASAL SINUS: ICD-10-CM

## 2023-07-15 DIAGNOSIS — Z11.3 SCREEN FOR STD (SEXUALLY TRANSMITTED DISEASE): Primary | ICD-10-CM

## 2023-07-15 DIAGNOSIS — R10.32 LLQ ABDOMINAL PAIN: ICD-10-CM

## 2023-07-15 LAB
ALBUMIN UR-MCNC: NEGATIVE MG/DL
APPEARANCE UR: CLEAR
BACTERIA #/AREA URNS HPF: ABNORMAL /HPF
BASOPHILS # BLD AUTO: 0.1 10E3/UL (ref 0–0.2)
BASOPHILS NFR BLD AUTO: 1 %
BILIRUB UR QL STRIP: NEGATIVE
CLUE CELLS: ABNORMAL
COLOR UR AUTO: YELLOW
EOSINOPHIL # BLD AUTO: 0.1 10E3/UL (ref 0–0.7)
EOSINOPHIL NFR BLD AUTO: 1 %
ERYTHROCYTE [DISTWIDTH] IN BLOOD BY AUTOMATED COUNT: 11.7 % (ref 10–15)
GLUCOSE UR STRIP-MCNC: NEGATIVE MG/DL
HCT VFR BLD AUTO: 45.5 % (ref 35–47)
HGB BLD-MCNC: 15 G/DL (ref 11.7–15.7)
HGB UR QL STRIP: ABNORMAL
IMM GRANULOCYTES # BLD: 0 10E3/UL
IMM GRANULOCYTES NFR BLD: 0 %
KETONES UR STRIP-MCNC: NEGATIVE MG/DL
LEUKOCYTE ESTERASE UR QL STRIP: NEGATIVE
LYMPHOCYTES # BLD AUTO: 2.4 10E3/UL (ref 0.8–5.3)
LYMPHOCYTES NFR BLD AUTO: 33 %
MCH RBC QN AUTO: 30.2 PG (ref 26.5–33)
MCHC RBC AUTO-ENTMCNC: 33 G/DL (ref 31.5–36.5)
MCV RBC AUTO: 92 FL (ref 78–100)
MONOCYTES # BLD AUTO: 0.6 10E3/UL (ref 0–1.3)
MONOCYTES NFR BLD AUTO: 8 %
NEUTROPHILS # BLD AUTO: 4.3 10E3/UL (ref 1.6–8.3)
NEUTROPHILS NFR BLD AUTO: 58 %
NITRATE UR QL: NEGATIVE
PH UR STRIP: 6 [PH] (ref 5–7)
PLATELET # BLD AUTO: 324 10E3/UL (ref 150–450)
RBC # BLD AUTO: 4.96 10E6/UL (ref 3.8–5.2)
RBC #/AREA URNS AUTO: ABNORMAL /HPF
SP GR UR STRIP: 1.01 (ref 1–1.03)
SQUAMOUS #/AREA URNS AUTO: ABNORMAL /LPF
TRICHOMONAS, WET PREP: ABNORMAL
UROBILINOGEN UR STRIP-ACNC: 0.2 E.U./DL
WBC # BLD AUTO: 7.4 10E3/UL (ref 4–11)
WBC #/AREA URNS AUTO: ABNORMAL /HPF
WBC'S/HIGH POWER FIELD, WET PREP: ABNORMAL
YEAST, WET PREP: ABNORMAL

## 2023-07-15 PROCEDURE — 81001 URINALYSIS AUTO W/SCOPE: CPT | Performed by: FAMILY MEDICINE

## 2023-07-15 PROCEDURE — 36415 COLL VENOUS BLD VENIPUNCTURE: CPT | Performed by: FAMILY MEDICINE

## 2023-07-15 PROCEDURE — 99214 OFFICE O/P EST MOD 30 MIN: CPT | Performed by: FAMILY MEDICINE

## 2023-07-15 PROCEDURE — 87591 N.GONORRHOEAE DNA AMP PROB: CPT | Performed by: FAMILY MEDICINE

## 2023-07-15 PROCEDURE — 85025 COMPLETE CBC W/AUTO DIFF WBC: CPT | Performed by: FAMILY MEDICINE

## 2023-07-15 PROCEDURE — 87210 SMEAR WET MOUNT SALINE/INK: CPT | Performed by: FAMILY MEDICINE

## 2023-07-15 PROCEDURE — 87491 CHLMYD TRACH DNA AMP PROBE: CPT | Performed by: FAMILY MEDICINE

## 2023-07-15 NOTE — PROGRESS NOTES
Chief Complaint   Patient presents with     Vaginal Problem     Patient presents with complain of vaginal discharge and odor. Want a STD test. Patient also complain of sinus pressure, post nasal drop and headache. States her allergies have been bad lately          Dalia was seen today for vaginal problem.    Diagnoses and all orders for this visit:    Screen for STD (sexually transmitted disease)    LLQ abdominal pain  -     UA with Microscopic reflex to Culture - Clinic Collect  -     CBC with platelets and differential; Future  -     CBC with platelets and differential    Vaginal discharge    Congestion of paranasal sinus    Other orders  -     Wet prep - Clinic Collect  -     Chlamydia trachomatis PCR  -     Neisseria gonorrhoeae PCR  -     UA Microscopic with Reflex to Culture    Plan  STD screening test pending  Wet prep did not show any infection patient was notified about the results in details  For the allergy symptoms suggested to consider doing Flonase/saline nasal spray/switch Allegra to cetirizine   About her abdominal pain no UTI was noted I do not think diverticulitis as a cause of her pain with the normal CBC result patient was advised to do fiber supplements and see if a normal bowel movement would help with the pain also notify patient if the pain gets worse she she should follow-up    Dalia Hart is a 52 year old female who presents for evaluation of lower abdominal pain, with vaginal discharge and also wants STD screening.   Urinalysis confirms no infection.  There has been no fever, back/flank pain or significant abdominal pain.  There is no clinical evidence of pyelonephritis, appendicitis, colitis, but with the right lower quadrant pain I am concerned about possibility of diverticulitis hence checking CBC I did discuss with patient about STD screening that blood tests are required but she declined blood testing today.  Follow-up if increasing pain, vomiting, fever Follow up with primary  physician is indicated if not improving in 2-3 days. Patient voices understanding and agreement with the plan.     Results for orders placed or performed in visit on 07/15/23   UA with Microscopic reflex to Culture - Clinic Collect     Status: Abnormal    Specimen: Urine, Midstream   Result Value Ref Range    Color Urine Yellow Colorless, Straw, Light Yellow, Yellow    Appearance Urine Clear Clear    Glucose Urine Negative Negative mg/dL    Bilirubin Urine Negative Negative    Ketones Urine Negative Negative mg/dL    Specific Gravity Urine 1.015 1.003 - 1.035    Blood Urine Trace (A) Negative    pH Urine 6.0 5.0 - 7.0    Protein Albumin Urine Negative Negative mg/dL    Urobilinogen Urine 0.2 0.2, 1.0 E.U./dL    Nitrite Urine Negative Negative    Leukocyte Esterase Urine Negative Negative   UA Microscopic with Reflex to Culture     Status: Abnormal   Result Value Ref Range    Bacteria Urine Moderate (A) None Seen /HPF    RBC Urine 0-2 0-2 /HPF /HPF    WBC Urine 0-5 0-5 /HPF /HPF    Squamous Epithelials Urine Few (A) None Seen /LPF    Narrative    Urine Culture not indicated   CBC with platelets and differential     Status: None   Result Value Ref Range    WBC Count 7.4 4.0 - 11.0 10e3/uL    RBC Count 4.96 3.80 - 5.20 10e6/uL    Hemoglobin 15.0 11.7 - 15.7 g/dL    Hematocrit 45.5 35.0 - 47.0 %    MCV 92 78 - 100 fL    MCH 30.2 26.5 - 33.0 pg    MCHC 33.0 31.5 - 36.5 g/dL    RDW 11.7 10.0 - 15.0 %    Platelet Count 324 150 - 450 10e3/uL    % Neutrophils 58 %    % Lymphocytes 33 %    % Monocytes 8 %    % Eosinophils 1 %    % Basophils 1 %    % Immature Granulocytes 0 %    Absolute Neutrophils 4.3 1.6 - 8.3 10e3/uL    Absolute Lymphocytes 2.4 0.8 - 5.3 10e3/uL    Absolute Monocytes 0.6 0.0 - 1.3 10e3/uL    Absolute Eosinophils 0.1 0.0 - 0.7 10e3/uL    Absolute Basophils 0.1 0.0 - 0.2 10e3/uL    Absolute Immature Granulocytes 0.0 <=0.4 10e3/uL   Wet prep - Clinic Collect     Status: Abnormal    Specimen: Vagina; Swab    Result Value Ref Range    Trichomonas Absent Absent    Yeast Absent Absent    Clue Cells Absent Absent    WBCs/high power field 2+ (A) None   CBC with platelets and differential     Status: None    Narrative    The following orders were created for panel order CBC with platelets and differential.  Procedure                               Abnormality         Status                     ---------                               -----------         ------                     CBC with platelets and d...[[id number]]                      Final result                 Please view results for these tests on the individual orders.       SUBJECTIVE:   52 year old female complains of vaginal discharge for 5 days.  Also has sinus pressure and post nasal drip with headache she has history of allergies and has been noticing worsening symptoms for last 1 month which she describes as postnasal drip along with stuffy nose has been using Flonase and also on Allegra  Denies abnormal vaginal bleeding or significant pelvic pain or  fever. No UTI symptoms.  Wants to be tested for STD.  She declined blood test for STD  She did mention that she has bowel movements but are not regular    No LMP recorded. Patient is premenopausal.    OBJECTIVE:   She appears well, afebrile.  Oral mucosa -moist  Bilateral tympanic membrane normal with no erythema or congestion  Nose nasal mucosa looked erythematous  Lungs chest clear with no Rales or rhonchi or wheezing  Abdomen: benign, soft, LLQ tender, no masses., normal bowel sounds         Carrie Mora MD

## 2023-07-16 LAB
C TRACH DNA SPEC QL NAA+PROBE: NEGATIVE
N GONORRHOEA DNA SPEC QL NAA+PROBE: NEGATIVE

## 2023-07-21 ENCOUNTER — VIRTUAL VISIT (OUTPATIENT)
Dept: PSYCHOLOGY | Facility: CLINIC | Age: 53
End: 2023-07-21
Payer: COMMERCIAL

## 2023-07-21 DIAGNOSIS — F43.22 ADJUSTMENT DISORDER WITH ANXIOUS MOOD: ICD-10-CM

## 2023-07-21 DIAGNOSIS — F33.1 MAJOR DEPRESSIVE DISORDER, RECURRENT EPISODE, MODERATE (H): Primary | ICD-10-CM

## 2023-07-21 PROCEDURE — 90837 PSYTX W PT 60 MINUTES: CPT | Mod: VID | Performed by: SOCIAL WORKER

## 2023-07-28 ENCOUNTER — VIRTUAL VISIT (OUTPATIENT)
Dept: PSYCHOLOGY | Facility: CLINIC | Age: 53
End: 2023-07-28
Payer: COMMERCIAL

## 2023-07-28 DIAGNOSIS — F43.22 ADJUSTMENT DISORDER WITH ANXIOUS MOOD: ICD-10-CM

## 2023-07-28 DIAGNOSIS — F33.1 MAJOR DEPRESSIVE DISORDER, RECURRENT EPISODE, MODERATE (H): Primary | ICD-10-CM

## 2023-07-28 PROCEDURE — 90834 PSYTX W PT 45 MINUTES: CPT | Mod: VID | Performed by: SOCIAL WORKER

## 2023-07-28 NOTE — PROGRESS NOTES
[facility]                                     Progress Note    Patient Name: aDlia Hart  Date: 2023         Service Type: Individual      Session Start Time: 11:24 AM Session started late due to [place] connection issues Session End Time: 12:09AM     Session Length:  45 min (Session was 53+ minutes in length due to: content of session, eshort-term goal setting, , progress review and scheduling)    Session #: 6    Attendees: Client attended alone    Service Modality: Phone Visit:      Provider verified identity through the following two step process.  Patient provided:  Patient  and Patient is known previously to provider    Telemedicine Visit: The patient's condition can be safely assessed and treated via synchronous audio and visual telemedicine encounter.      Reason for Telemedicine Visit: Patient convenience (e.g. access to timely appointments / distance to available provider)    Originating Site (Patient Location): Patient's home    Distant Site (Provider Location): Provider Remote Setting- Home Office    Consent:  The patient/guardian has verbally consented to: the potential risks and benefits of telemedicine (video visit) versus in person care; bill my insurance or make self-payment for services provided; and responsibility for payment of non-covered services.   Distant Location (Provider):  Off-site    As the provider I attest to compliance with applicable laws and regulations related to telemedicine.    DATA  Interactive Complexity: No  Crisis: No        Progress Since Last Session (Related to Symptoms / Goals / Homework):   Symptoms: No change in symptoms reported.     Homework: Achieved / completed to satisfaction      Episode of Care Goals: Satisfactory progress - ACTION (Actively working towards change); Intervened by reinforcing change plan / affirming steps taken     Current / Ongoing Stressors and Concerns:   The patient presents with depression and adjustment  "disorder symptoms. The patient reports that she is \"feeling way better\" after she chose to \"go visit another city and do a bunch of other stuff.\" The patient identified the following stressors and concerns: interpersonal stress related to her recent interactions with her daughter and her significant other. Patient processed through her thoughts and emotions related to: her recent interpersonal interactions, her interpersonal relationships, dating, and her relationship history. Patient and therapist also discussed the benefits of continuing to maintain healthy interpersonal boundaries and the benefits of mindfulness and positive affirmations.        Treatment Objective(s) Addressed in This Session:    Patient will identify triggers/ fears / thoughts that contribute to feeling anxious   Patient will use 1 or more coping skills for anxiety management each week.    Teach emotional regulation skills. (mindfulness, breathing techniques, progressive muscle relaxation)    Patient will practice setting boundaries 1 or more times each week.      Intervention:   Client Centered   Psychodynamic   Psycho-education on the benefits of using the 5 sensations help herself stay present.    Validation and Normalization   Integrative Psychotherapy   Co-develop short-term goals and review progress in session.    Assessments completed prior to visit: None  The following assessments were completed by patient for this visit: None      ASSESSMENT: Current Emotional / Mental Status (status of significant symptoms):   Risk status (Self / Other harm or suicidal ideation)   Patient denies current fears or concerns for personal safety.   Patient denies current or recent suicidal ideation or behaviors.   Patient denies current or recent homicidal ideation or behaviors.   Patient denies current or recent self injurious behavior or ideation.   Patient denies other safety concerns.   Patient reports there has been no change in risk factors since " their last session.     Patient reports there has been no change in protective factors since their last session.     Recommended that patient call 911 or go to the local ED should there be a change in any of these risk factors.     Appearance:   Appropriate    Eye Contact:   Good    Psychomotor Behavior: Normal    Attitude:   Cooperative  Interested Friendly Pleasant Attentive   Orientation:   Person Place Time Situation All   Speech    Rate / Production: Normal/ Responsive    Volume:  Normal    Mood:    Anxious  Normal   Affect:    Appropriate    Thought Content:  Clear    Thought Form:  Coherent  Goal Directed  Logical    Insight:    Good      Medication Review:   No current psychiatric medications prescribed     Medication Compliance:   NA     Changes in Health Issues:   None reported     Chemical Use Review:   Substance Use: Chemical use reviewed, no active concerns identified      Tobacco Use: Not addressed in visit.     Diagnosis:  1. Major depressive disorder, recurrent episode, moderate (H)    2. Adjustment disorder with anxious mood          Collateral Reports Completed:   Not Applicable    PLAN: (Patient Tasks / Therapist Tasks / Other)  Patient will enjoy her spa day.  Patient plans to organize 1 small area a day.   Patient plans to check-in with her own energy each morning and after her interpersonal interactions.  Patient plans to return for follow up: 8/02/2023        Aidee Conti, [facility]                                                         ______________________________________________________________________    Individual Treatment Plan    Patient's Name: Dalia Hart  YOB: 1970    Date of Creation: 7/13/2023  Date Treatment Plan Last Reviewed/Revised: 7/13/2023    DSM5 Diagnoses:   1. Major depressive disorder, recurrent episode, moderate (H)    2. Adjustment disorder with anxious mood      Psychosocial / Contextual Factors: Patient identified the following  stressors/concerns: financial, patient recently lost her job, and parenting stress. Patent report that she is not getting a lot of sleep and reports that she does not always get regular sleep.   PROMIS (reviewed every 90 days): 26    Referral / Collaboration:  Referral to another professional/service is not indicated at this time..    Anticipated number of session for this episode of care: 9-12 sessions  Anticipation frequency of session: Weekly  Anticipated Duration of each session: 38-52 minutes  Treatment plan will be reviewed in 90 days or when goals have been changed.       MeasurableTreatment Goal(s) related to diagnosis / functional impairment(s)  Goal 1: Decrease severity of depression symptoms.       Objective #A (Patient Action)    Patient will Decrease frequency and intensity of feeling down, depressed, hopeless.  Status: New - Date: 7/13/2023      Intervention(s)  Client Centered  CBT  Psycho-dynamic  Internal Family Systems  Psycho-education    Objective #B  Patient will Improve quantity and quality of night time sleep / decrease daytime naps.  Status: New - Date: 7/13/2023      Intervention(s)  Client Centered  CBT  Internal Family Systems  Psycho-education  Mindulness    Objective #C  Patient will Identify negative self-talk and behaviors: challenge core beliefs, myths, and actions.  Status: New - Date: 7/13/2023      Intervention(s)  Client Centered  CBT  Internal Family Systems  Psycho-education      Goal 2: Decrease severity of anxiety related symptoms.       Objective #A (Patient Action)    Status: New - Date: 7/13/2023      Patient will identify triggers/ fears / thoughts that contribute to feeling anxious.    Intervention(s)  Psychodynamic  CBT  Internal Family Systems    Objective #B  Patient will use 1 or more coping skills for anxiety management each week.   Status: New - Date: 7/13/2023      Intervention(s)  Therapist will teach emotional regulation skills. (mindfulness, breathing  techniques, progressive muscle relaxation) .  Co-develop short-term goals and review progress in session.   CBT  Internal Family Systems  Mindfulness      Goal 3: Improve interpersonal relationships.       Objective #A (Patient Action)    Status: New - Date: 7/13/2023      Patient will compile a list of boundaries that they would like to set with others.    Intervention(s)  Client Centered  Motivational Interviewing  Psycho-education  Psychodynamic  Internal Family Systems.     Objective #B  Patient will practice setting boundaries 1 or more times each week.    Status: New - Date: 7/13/2023      Intervention(s)  Client Centered  Motivational Interviewing  Co-develop short-term goals and review progress in session.         Patient has reviewed and agreed to the above plan.      Aidee Conti, [facility]  July 13, 2023

## 2023-08-02 ENCOUNTER — VIRTUAL VISIT (OUTPATIENT)
Dept: PSYCHOLOGY | Facility: CLINIC | Age: 53
End: 2023-08-02
Payer: COMMERCIAL

## 2023-08-02 DIAGNOSIS — F33.1 MAJOR DEPRESSIVE DISORDER, RECURRENT EPISODE, MODERATE (H): Primary | ICD-10-CM

## 2023-08-02 DIAGNOSIS — F43.22 ADJUSTMENT DISORDER WITH ANXIOUS MOOD: ICD-10-CM

## 2023-08-02 PROCEDURE — 90837 PSYTX W PT 60 MINUTES: CPT | Mod: VID | Performed by: SOCIAL WORKER

## 2023-08-02 NOTE — PROGRESS NOTES
[facility]                                     Progress Note    Patient Name: Dalia Hart  Date: 2023           Service Type: Individual      Session Start Time: 1:41 PM Session started late due to [place] connection issues Session End Time: 2:34 PM     Session Length: 53 min (Session was 53+ minutes in length due to: content of session, short-term goal setting, , progress review and scheduling)    Session #: 7    Attendees: Client attended alone    Service Modality: Phone Visit:      Provider verified identity through the following two step process.  Patient provided:  Patient  and Patient is known previously to provider    Telemedicine Visit: The patient's condition can be safely assessed and treated via synchronous audio and visual telemedicine encounter.      Reason for Telemedicine Visit: Patient convenience (e.g. access to timely appointments / distance to available provider)    Originating Site (Patient Location): Patient's home    Distant Site (Provider Location): Provider Remote Setting- Home Office    Consent:  The patient/guardian has verbally consented to: the potential risks and benefits of telemedicine (video visit) versus in person care; bill my insurance or make self-payment for services provided; and responsibility for payment of non-covered services.   Distant Location (Provider):  Off-site    As the provider I attest to compliance with applicable laws and regulations related to telemedicine.    DATA  Interactive Complexity: No  Crisis: No        Progress Since Last Session (Related to Symptoms / Goals / Homework):   Symptoms: No change in symptoms reported.     Homework: Achieved / completed to satisfaction      Episode of Care Goals: Satisfactory progress - ACTION (Actively working towards change); Intervened by reinforcing change plan / affirming steps taken     Current / Ongoing Stressors and Concerns:   The patient presents with depression and adjustment  "disorder symptoms. The patient reports that she \"had an amazing time at the spa.\" The patient processed through her thoughts and emotions related to: her weekend getaway with her daughters, her interpersonal relationships and her job. Patient and therapist also discussed the benefits of continuing to maintain healthy interpersonal boundaries and the benefits of mindfulness and positive affirmations.        Treatment Objective(s) Addressed in This Session:    Patient will identify triggers/ fears / thoughts that contribute to feeling anxious   Patient will use 1 or more coping skills for anxiety management each week.    Teach emotional regulation skills. (mindfulness, breathing techniques, progressive muscle relaxation)    Patient will practice setting boundaries 1 or more times each week.      Intervention:   Client Centered   Psychodynamic   Psycho-education on the benefits of using the 5 sensations help herself stay present.    Validation and Normalization   Integrative Psychotherapy   Co-develop short-term goals and review progress in session.    Assessments completed prior to visit: None  The following assessments were completed by patient for this visit: None      ASSESSMENT: Current Emotional / Mental Status (status of significant symptoms):   Risk status (Self / Other harm or suicidal ideation)   Patient denies current fears or concerns for personal safety.   Patient denies current or recent suicidal ideation or behaviors.   Patient denies current or recent homicidal ideation or behaviors.   Patient denies current or recent self injurious behavior or ideation.   Patient denies other safety concerns.   Patient reports there has been no change in risk factors since their last session.     Patient reports there has been no change in protective factors since their last session.     Recommended that patient call 911 or go to the local ED should there be a change in any of these risk " "factors.     Appearance:   Appropriate    Eye Contact:   Good    Psychomotor Behavior: Normal    Attitude:   Cooperative  Interested Friendly Pleasant Attentive   Orientation:   Person Place Time Situation All   Speech    Rate / Production: Normal/ Responsive    Volume:  Normal    Mood:    Anxious  Normal   Affect:    Appropriate    Thought Content:  Clear    Thought Form:  Coherent  Goal Directed  Logical    Insight:    Good      Medication Review:   No current psychiatric medications prescribed     Medication Compliance:   NA     Changes in Health Issues:   None reported     Chemical Use Review:   Substance Use: Chemical use reviewed, no active concerns identified      Tobacco Use: Not addressed in visit.     Diagnosis:  1. Major depressive disorder, recurrent episode, moderate (H)    2. Adjustment disorder with anxious mood            Collateral Reports Completed:   Not Applicable    PLAN: (Patient Tasks / Therapist Tasks / Other)  Patient plans to \"move around every day\"/\"do something active every day\"  Patient plans to go to [facility] and plans to start her rehab.   Patient plans to organize 1 small area a day.   Patient plans to check-in with her own energy each morning and after her interpersonal interactions.  Patient plans to return for follow up: 8/16/2023      In a future session: patient plans to make a strict schedule so that she doesn't overwork herself. Pattient plans to come up with a plan to manage her SAD      Aidee ACEVEDO Conti, LICSW                                                         ______________________________________________________________________    Individual Treatment Plan    Patient's Name: Dalia Hart  YOB: 1970    Date of Creation: 7/13/2023  Date Treatment Plan Last Reviewed/Revised: 7/13/2023    DSM5 Diagnoses:   1. Major depressive disorder, recurrent episode, moderate (H)    2. Adjustment disorder with anxious mood      Psychosocial / Contextual Factors: " Patient identified the following stressors/concerns: financial, patient recently lost her job, and parenting stress. Patent report that she is not getting a lot of sleep and reports that she does not always get regular sleep.   PROMIS (reviewed every 90 days): 26    Referral / Collaboration:  Referral to another professional/service is not indicated at this time..    Anticipated number of session for this episode of care: 9-12 sessions  Anticipation frequency of session: Weekly  Anticipated Duration of each session: 38-52 minutes  Treatment plan will be reviewed in 90 days or when goals have been changed.       MeasurableTreatment Goal(s) related to diagnosis / functional impairment(s)  Goal 1: Decrease severity of depression symptoms.       Objective #A (Patient Action)    Patient will Decrease frequency and intensity of feeling down, depressed, hopeless.  Status: New - Date: 7/13/2023      Intervention(s)  Client Centered  CBT  Psycho-dynamic  Internal Family Systems  Psycho-education    Objective #B  Patient will Improve quantity and quality of night time sleep / decrease daytime naps.  Status: New - Date: 7/13/2023      Intervention(s)  Client Centered  CBT  Internal Family Systems  Psycho-education  Mindulness    Objective #C  Patient will Identify negative self-talk and behaviors: challenge core beliefs, myths, and actions.  Status: New - Date: 7/13/2023      Intervention(s)  Client Centered  CBT  Internal Family Systems  Psycho-education      Goal 2: Decrease severity of anxiety related symptoms.       Objective #A (Patient Action)    Status: New - Date: 7/13/2023      Patient will identify triggers/ fears / thoughts that contribute to feeling anxious.    Intervention(s)  Psychodynamic  CBT  Internal Family Systems    Objective #B  Patient will use 1 or more coping skills for anxiety management each week.   Status: New - Date: 7/13/2023      Intervention(s)  Therapist will teach emotional regulation skills.  (mindfulness, breathing techniques, progressive muscle relaxation) .  Co-develop short-term goals and review progress in session.   CBT  Internal Family Systems  Mindfulness      Goal 3: Improve interpersonal relationships.       Objective #A (Patient Action)    Status: New - Date: 7/13/2023      Patient will compile a list of boundaries that they would like to set with others.    Intervention(s)  Client Centered  Motivational Interviewing  Psycho-education  Psychodynamic  Internal Family Systems.     Objective #B  Patient will practice setting boundaries 1 or more times each week.    Status: New - Date: 7/13/2023      Intervention(s)  Client Centered  Motivational Interviewing  Co-develop short-term goals and review progress in session.         Patient has reviewed and agreed to the above plan.      Aidee Conti, [facility]  July 13, 2023

## 2023-08-16 ENCOUNTER — VIRTUAL VISIT (OUTPATIENT)
Dept: PSYCHOLOGY | Facility: CLINIC | Age: 53
End: 2023-08-16
Payer: COMMERCIAL

## 2023-08-16 DIAGNOSIS — F33.1 MAJOR DEPRESSIVE DISORDER, RECURRENT EPISODE, MODERATE (H): Primary | ICD-10-CM

## 2023-08-16 DIAGNOSIS — F43.22 ADJUSTMENT DISORDER WITH ANXIOUS MOOD: ICD-10-CM

## 2023-08-16 PROCEDURE — 90837 PSYTX W PT 60 MINUTES: CPT | Mod: VID | Performed by: SOCIAL WORKER

## 2023-08-16 NOTE — PROGRESS NOTES
[facility]                                     Progress Note    Patient Name: Dalia Hart  Date: 2023       Service Type: Individual      Session Start Time: 1:39 PM Session started late due to [place] connection issues Session End Time: 2:34 PM     Session Length:  55 min (Session was 53+ minutes in length due to: content of session, short-term goal setting, progress review and scheduling)    Session #: 8    Attendees: Client attended alone    Service Modality: Phone Visit:      Provider verified identity through the following two step process.  Patient provided:  Patient  and Patient is known previously to provider     Telemedicine Visit: The patient's condition can be safely assessed and treated via synchronous audio and visual telemedicine encounter.      Reason for Telemedicine Visit: Patient convenience (e.g. access to timely appointments / distance to available provider)    Originating Site (Patient Location): Patient's home    Distant Site (Provider Location): Provider Remote Setting- Home Office    Consent:  The patient/guardian has verbally consented to: the potential risks and benefits of telemedicine (video visit) versus in person care; bill my insurance or make self-payment for services provided; and responsibility for payment of non-covered services.   Distant Location (Provider):  Off-site    As the provider I attest to compliance with applicable laws and regulations related to telemedicine.    DATA  Interactive Complexity: No  Crisis: No        Progress Since Last Session (Related to Symptoms / Goals / Homework):   Symptoms: No change in symptoms reported.     Homework: Partially completed      Episode of Care Goals: Satisfactory progress - ACTION (Actively working towards change); Intervened by reinforcing change plan / affirming steps taken     Current / Ongoing Stressors and Concerns:   The patient presents with depression and adjustment disorder symptoms.  "The patient identified the following stressors/concerns: she has been having a difficult time sleeping, work related stress, and interpersonal/dating stress. The patient states: \"I'm pretty good but I'm tired\".  The patient reports that she is looking forward to her upcoming medical appointment because she is looking forward to getting back into PT. The patient processed through her thoughts and emotions related to: her jobs, her interpersonal relationships, her recent interpersonal interactions, dating, and her current living situation  Patient and therapist continue to discussed the benefits of continuing to maintain healthy interpersonal boundaries and the benefits of mindfulness and positive affirmations.        Treatment Objective(s) Addressed in This Session:    Patient will identify triggers/ fears / thoughts that contribute to feeling anxious   Patient will use 1 or more coping skills for anxiety management each week.    Teach emotional regulation skills. (mindfulness, breathing techniques, progressive muscle relaxation)    Patient will practice setting boundaries 1 or more times each week.      Intervention:   Client Centered   Psychodynamic   Psycho-education    Validation and Normalization   Integrative Psychotherapy   Co-develop short-term goals and review progress in session.    Assessments completed prior to visit: None  The following assessments were completed by patient for this visit: None      ASSESSMENT: Current Emotional / Mental Status (status of significant symptoms):   Risk status (Self / Other harm or suicidal ideation)   Patient denies current fears or concerns for personal safety.   Patient denies current or recent suicidal ideation or behaviors.   Patient denies current or recent homicidal ideation or behaviors.   Patient denies current or recent self injurious behavior or ideation.   Patient denies other safety concerns.   Patient reports there has been no change in risk factors since " "their last session.     Patient reports there has been no change in protective factors since their last session.     Recommended that patient call 911 or go to the local ED should there be a change in any of these risk factors.     Appearance:   Appropriate    Eye Contact:   Good    Psychomotor Behavior: Normal    Attitude:   Cooperative  Interested Friendly Pleasant Attentive   Orientation:   Person Place Time Situation All   Speech    Rate / Production: Normal/ Responsive    Volume:  Normal    Mood:    Anxious  Normal Frustrated   Affect:    Appropriate    Thought Content:  Clear    Thought Form:  Coherent  Goal Directed  Logical    Insight:    Good      Medication Review:   No current psychiatric medications prescribed     Medication Compliance:   NA     Changes in Health Issues:   None reported     Chemical Use Review:   Substance Use: Chemical use reviewed, no active concerns identified      Tobacco Use: Not addressed in visit.     Diagnosis:  1. Major depressive disorder, recurrent episode, moderate (H)    2. Adjustment disorder with anxious mood          Collateral Reports Completed:   Not Applicable    PLAN: (Patient Tasks / Therapist Tasks / Other)  Patient plans to spend time with other people.   Patient plans to \"move around every day\"/\"do something active every day\".   Patient plans to organize 1 small area a day.   Patient plans to check-in with her own energy each morning and after her interpersonal interactions.  Patient plans to return for follow up: 8/23/2023      In a future session: patient plans to make a strict schedule so that she doesn't overwork herself. Pattient plans to come up with a plan to manage her SAD      Aidee Conti, [facility]                                                         ______________________________________________________________________    Individual Treatment Plan    Patient's Name: Dalia Hart  YOB: 1970    Date of Creation: 7/13/2023  Date " Treatment Plan Last Reviewed/Revised: 7/13/2023    DSM5 Diagnoses:   1. Major depressive disorder, recurrent episode, moderate (H)    2. Adjustment disorder with anxious mood      Psychosocial / Contextual Factors: Patient identified the following stressors/concerns: financial, patient recently lost her job, and parenting stress. Patent report that she is not getting a lot of sleep and reports that she does not always get regular sleep.   PROMIS (reviewed every 90 days): 26    Referral / Collaboration:  Referral to another professional/service is not indicated at this time..    Anticipated number of session for this episode of care: 9-12 sessions  Anticipation frequency of session: Weekly  Anticipated Duration of each session: 38-52 minutes  Treatment plan will be reviewed in 90 days or when goals have been changed.       MeasurableTreatment Goal(s) related to diagnosis / functional impairment(s)  Goal 1: Decrease severity of depression symptoms.       Objective #A (Patient Action)    Patient will Decrease frequency and intensity of feeling down, depressed, hopeless.  Status: New - Date: 7/13/2023      Intervention(s)  Client Centered  CBT  Psycho-dynamic  Internal Family Systems  Psycho-education    Objective #B  Patient will Improve quantity and quality of night time sleep / decrease daytime naps.  Status: New - Date: 7/13/2023      Intervention(s)  Client Centered  CBT  Internal Family Systems  Psycho-education  Mindulness    Objective #C  Patient will Identify negative self-talk and behaviors: challenge core beliefs, myths, and actions.  Status: New - Date: 7/13/2023      Intervention(s)  Client Centered  CBT  Internal Family Systems  Psycho-education      Goal 2: Decrease severity of anxiety related symptoms.       Objective #A (Patient Action)    Status: New - Date: 7/13/2023      Patient will identify triggers/ fears / thoughts that contribute to feeling  anxious.    Intervention(s)  Psychodynamic  CBT  Internal Family Systems    Objective #B  Patient will use 1 or more coping skills for anxiety management each week.   Status: New - Date: 7/13/2023      Intervention(s)  Therapist will teach emotional regulation skills. (mindfulness, breathing techniques, progressive muscle relaxation) .  Co-develop short-term goals and review progress in session.   CBT  Internal Family Systems  Mindfulness      Goal 3: Improve interpersonal relationships.       Objective #A (Patient Action)    Status: New - Date: 7/13/2023      Patient will compile a list of boundaries that they would like to set with others.    Intervention(s)  Client Centered  Motivational Interviewing  Psycho-education  Psychodynamic  Internal Family Systems.     Objective #B  Patient will practice setting boundaries 1 or more times each week.    Status: New - Date: 7/13/2023      Intervention(s)  Client Centered  Motivational Interviewing  Co-develop short-term goals and review progress in session.         Patient has reviewed and agreed to the above plan.      Aidee Conti, [facility]  July 13, 2023

## 2023-08-21 ENCOUNTER — OFFICE VISIT (OUTPATIENT)
Dept: URGENT CARE | Facility: URGENT CARE | Age: 53
End: 2023-08-21
Payer: COMMERCIAL

## 2023-08-21 VITALS
WEIGHT: 209 LBS | TEMPERATURE: 98.1 F | BODY MASS INDEX: 33.73 KG/M2 | OXYGEN SATURATION: 97 % | DIASTOLIC BLOOD PRESSURE: 67 MMHG | SYSTOLIC BLOOD PRESSURE: 102 MMHG | HEART RATE: 76 BPM

## 2023-08-21 DIAGNOSIS — J06.9 UPPER RESPIRATORY TRACT INFECTION, UNSPECIFIED TYPE: Primary | ICD-10-CM

## 2023-08-21 DIAGNOSIS — R07.0 THROAT PAIN: ICD-10-CM

## 2023-08-21 DIAGNOSIS — R05.1 ACUTE COUGH: ICD-10-CM

## 2023-08-21 DIAGNOSIS — J30.2 SEASONAL ALLERGIES: ICD-10-CM

## 2023-08-21 DIAGNOSIS — Z72.0 TOBACCO ABUSE: ICD-10-CM

## 2023-08-21 DIAGNOSIS — T14.8XXA HEMATOMA OF SKIN: ICD-10-CM

## 2023-08-21 DIAGNOSIS — S89.92XA LEG INJURY, LEFT, INITIAL ENCOUNTER: ICD-10-CM

## 2023-08-21 LAB
DEPRECATED S PYO AG THROAT QL EIA: NEGATIVE
GROUP A STREP BY PCR: NOT DETECTED
SARS-COV-2 RNA RESP QL NAA+PROBE: NEGATIVE

## 2023-08-21 PROCEDURE — 99214 OFFICE O/P EST MOD 30 MIN: CPT | Performed by: FAMILY MEDICINE

## 2023-08-21 PROCEDURE — 87635 SARS-COV-2 COVID-19 AMP PRB: CPT | Performed by: FAMILY MEDICINE

## 2023-08-21 PROCEDURE — 87651 STREP A DNA AMP PROBE: CPT | Performed by: FAMILY MEDICINE

## 2023-08-21 RX ORDER — MOMETASONE FUROATE MONOHYDRATE 50 UG/1
2 SPRAY, METERED NASAL DAILY
Qty: 17 G | Refills: 0 | Status: SHIPPED | OUTPATIENT
Start: 2023-08-21 | End: 2024-05-10

## 2023-08-21 RX ORDER — BENZONATATE 200 MG/1
200 CAPSULE ORAL 3 TIMES DAILY PRN
Qty: 21 CAPSULE | Refills: 0 | Status: SHIPPED | OUTPATIENT
Start: 2023-08-21 | End: 2023-08-28

## 2023-08-21 NOTE — PROGRESS NOTES
"SUBJECTIVE: Dalia Hart is a 52 year old female presenting with a chief complaint of \"cold symptoms\".  Onset of symptoms was 2 day(s) ago.  Predisposing factors include tobacco use.    Past Medical History:   Diagnosis Date    Morbid obesity (H)      Allergies   Allergen Reactions    Bee Venom Anaphylaxis     Other reaction(s): Other, see comments  Unsure, swelling,   Other reaction(s): *Unknown    Seasonal Allergies      Social History     Tobacco Use    Smoking status: Some Days     Packs/day: 0.25     Years: 11.00     Pack years: 2.75     Types: Cigarettes    Smokeless tobacco: Never    Tobacco comments:     1 pack per month   Substance Use Topics    Alcohol use: No     Alcohol/week: 0.0 standard drinks of alcohol       ROS:  SKIN: no rash  GI: no vomiting    OBJECTIVE:  /67   Pulse 76   Temp 98.1  F (36.7  C) (Tympanic)   Wt 94.8 kg (209 lb)   SpO2 97%   BMI 33.73 kg/m  GENERAL APPEARANCE: healthy, alert and no distress  EYES: EOMI,  PERRL, conjunctiva clear  HENT: ear canals and TM's normal.  Nose and mouth without ulcers, erythema or lesions  RESP: lungs clear to auscultation - no rales, rhonchi or wheezes  SKIN: hematoma left lower ext      ICD-10-CM    1. Upper respiratory tract infection, unspecified type  J06.9       2. Throat pain  R07.0 Streptococcus A Rapid Screen w/Reflex to PCR - Clinic Collect     Group A Streptococcus PCR Throat Swab      3. Acute cough  R05.1 Symptomatic COVID-19 Virus (Coronavirus) by PCR Nasopharyngeal     benzonatate (TESSALON) 200 MG capsule      4. Leg injury, left, initial encounter  S89.92XA       5. Hematoma of skin  T14.8XXA       6. Tobacco abuse  Z72.0         Quit tobacco  Warm pack hematoma  Fluids/Rest, f/u if worse/not any better    "

## 2023-08-23 ENCOUNTER — VIRTUAL VISIT (OUTPATIENT)
Dept: PSYCHOLOGY | Facility: CLINIC | Age: 53
End: 2023-08-23
Payer: COMMERCIAL

## 2023-08-23 DIAGNOSIS — F43.22 ADJUSTMENT DISORDER WITH ANXIOUS MOOD: ICD-10-CM

## 2023-08-23 DIAGNOSIS — F33.1 MAJOR DEPRESSIVE DISORDER, RECURRENT EPISODE, MODERATE (H): Primary | ICD-10-CM

## 2023-08-23 PROCEDURE — 90837 PSYTX W PT 60 MINUTES: CPT | Mod: VID | Performed by: SOCIAL WORKER

## 2023-08-23 NOTE — PROGRESS NOTES
[facility]                                     Progress Note    Patient Name: Dalia Hart  Date: 2023       Service Type: Individual      Session Start Time: 11:19 AM Session started late due to [place] connection issues Session End Time:  12:18 PM     Session Length: 59  min (Session was 53+ minutes in length due to: content of session, short-term goal setting, progress review and scheduling)    Session #: 9    Attendees: Client attended alone    Service Modality: Phone Visit:      Provider verified identity through the following two step process.  Patient provided:  Patient  and Patient is known previously to provider     Telemedicine Visit: The patient's condition can be safely assessed and treated via synchronous audio and visual telemedicine encounter.      Reason for Telemedicine Visit: Patient convenience (e.g. access to timely appointments / distance to available provider)    Originating Site (Patient Location): Patient's home    Distant Site (Provider Location): Provider Remote Setting- Home Office    Consent:  The patient/guardian has verbally consented to: the potential risks and benefits of telemedicine (video visit) versus in person care; bill my insurance or make self-payment for services provided; and responsibility for payment of non-covered services.   Distant Location (Provider):  Off-site    As the provider I attest to compliance with applicable laws and regulations related to telemedicine.    DATA  Interactive Complexity: No  Crisis: No        Progress Since Last Session (Related to Symptoms / Goals / Homework):   Symptoms: No change in symptoms reported.     Homework: Partially completed      Episode of Care Goals: Satisfactory progress - ACTION (Actively working towards change); Intervened by reinforcing change plan / affirming steps taken     Current / Ongoing Stressors and Concerns:   The patient presents with depression and adjustment disorder  symptoms. The patient identified the following stressors/concerns: she has been feeling sick with a cold and interpersonal/relationship stress/dissatisfaction.  The patient processed through her thoughts and emotions related to: her relationships with her significant other, her recent interpersonal interactions, her interpersonal relationships, dating and her goals. Therapist and patient explored/discussed patient's interests and life goals. Patient and therapist continue to discussed the benefits of continuing to maintain healthy interpersonal boundaries and the benefits of mindfulness and positive affirmations.        Treatment Objective(s) Addressed in This Session: Continued to address:   Patient will identify triggers/ fears / thoughts that contribute to feeling anxious   Patient will use 1 or more coping skills for anxiety management each week.    Teach emotional regulation skills. (mindfulness, breathing techniques, progressive muscle relaxation)    Patient will practice setting boundaries 1 or more times each week.      Intervention:   Client Centered   Psychodynamic   Psycho-education    Validation and Normalization   Integrative Psychotherapy   Co-develop short-term goals and review progress in session.    Assessments completed prior to visit: None  The following assessments were completed by patient for this visit: None      ASSESSMENT: Current Emotional / Mental Status (status of significant symptoms):   Risk status (Self / Other harm or suicidal ideation)   Patient denies current fears or concerns for personal safety.   Patient denies current or recent suicidal ideation or behaviors.   Patient denies current or recent homicidal ideation or behaviors.   Patient denies current or recent self injurious behavior or ideation.   Patient denies other safety concerns.   Patient reports there has been no change in risk factors since their last session.     Patient reports there has been no change in protective  "factors since their last session.     Recommended that patient call 911 or go to the local ED should there be a change in any of these risk factors.     Appearance:   Appropriate    Eye Contact:   Good    Psychomotor Behavior: Normal    Attitude:   Cooperative  Interested Friendly Pleasant Attentive   Orientation:   Person Place Time Situation All   Speech    Rate / Production: Normal/ Responsive    Volume:  Normal    Mood:    Anxious  Normal   Affect:    Appropriate    Thought Content:  Clear    Thought Form:  Coherent  Goal Directed  Logical    Insight:    Good      Medication Review:   No current psychiatric medications prescribed     Medication Compliance:   NA     Changes in Health Issues:   None reported     Chemical Use Review:   Substance Use: Chemical use reviewed, no active concerns identified      Tobacco Use: Not addressed in visit.     Diagnosis:  1. Major depressive disorder, recurrent episode, moderate (H)    2. Adjustment disorder with anxious mood        Collateral Reports Completed:   Not Applicable    PLAN: (Patient Tasks / Therapist Tasks / Other)  Patient plans to journal what she really wants in life.   Patient plans to \"move around every day\"/\"do something active every day\".   Patient plans to organize 1 small area a day.   Patient plans to check-in with her own energy each morning and after her interpersonal interactions.  Patient plans to order the book \"Finding Meaning in the Second Half of Life.\"   Patient plans to return for follow up: 8/31/2023      In a future session: patient plans to make a strict schedule so that she doesn't overwork herself. Pattient plans to come up with a plan to manage her SAD      Aidee Conti, [facility]                                                         ______________________________________________________________________    Individual Treatment Plan    Patient's Name: Dalia Hart  YOB: 1970    Date of Creation: 7/13/2023  Date " Treatment Plan Last Reviewed/Revised: 7/13/2023    DSM5 Diagnoses:   1. Major depressive disorder, recurrent episode, moderate (H)    2. Adjustment disorder with anxious mood      Psychosocial / Contextual Factors: Patient identified the following stressors/concerns: financial, patient recently lost her job, and parenting stress. Patent report that she is not getting a lot of sleep and reports that she does not always get regular sleep.   PROMIS (reviewed every 90 days): 26    Referral / Collaboration:  Referral to another professional/service is not indicated at this time..    Anticipated number of session for this episode of care: 9-12 sessions  Anticipation frequency of session: Weekly  Anticipated Duration of each session: 38-52 minutes  Treatment plan will be reviewed in 90 days or when goals have been changed.       MeasurableTreatment Goal(s) related to diagnosis / functional impairment(s)  Goal 1: Decrease severity of depression symptoms.       Objective #A (Patient Action)    Patient will Decrease frequency and intensity of feeling down, depressed, hopeless.  Status: New - Date: 7/13/2023      Intervention(s)  Client Centered  CBT  Psycho-dynamic  Internal Family Systems  Psycho-education    Objective #B  Patient will Improve quantity and quality of night time sleep / decrease daytime naps.  Status: New - Date: 7/13/2023      Intervention(s)  Client Centered  CBT  Internal Family Systems  Psycho-education  Mindulness    Objective #C  Patient will Identify negative self-talk and behaviors: challenge core beliefs, myths, and actions.  Status: New - Date: 7/13/2023      Intervention(s)  Client Centered  CBT  Internal Family Systems  Psycho-education      Goal 2: Decrease severity of anxiety related symptoms.       Objective #A (Patient Action)    Status: New - Date: 7/13/2023      Patient will identify triggers/ fears / thoughts that contribute to feeling  anxious.    Intervention(s)  Psychodynamic  CBT  Internal Family Systems    Objective #B  Patient will use 1 or more coping skills for anxiety management each week.   Status: New - Date: 7/13/2023      Intervention(s)  Therapist will teach emotional regulation skills. (mindfulness, breathing techniques, progressive muscle relaxation) .  Co-develop short-term goals and review progress in session.   CBT  Internal Family Systems  Mindfulness      Goal 3: Improve interpersonal relationships.       Objective #A (Patient Action)    Status: New - Date: 7/13/2023      Patient will compile a list of boundaries that they would like to set with others.    Intervention(s)  Client Centered  Motivational Interviewing  Psycho-education  Psychodynamic  Internal Family Systems.     Objective #B  Patient will practice setting boundaries 1 or more times each week.    Status: New - Date: 7/13/2023      Intervention(s)  Client Centered  Motivational Interviewing  Co-develop short-term goals and review progress in session.         Patient has reviewed and agreed to the above plan.      Aidee Conti, [facility]  July 13, 2023

## 2023-08-31 ENCOUNTER — VIRTUAL VISIT (OUTPATIENT)
Dept: PSYCHOLOGY | Facility: CLINIC | Age: 53
End: 2023-08-31
Payer: COMMERCIAL

## 2023-08-31 DIAGNOSIS — F33.1 MAJOR DEPRESSIVE DISORDER, RECURRENT EPISODE, MODERATE (H): Primary | ICD-10-CM

## 2023-08-31 DIAGNOSIS — F43.22 ADJUSTMENT DISORDER WITH ANXIOUS MOOD: ICD-10-CM

## 2023-08-31 PROCEDURE — 90834 PSYTX W PT 45 MINUTES: CPT | Mod: VID | Performed by: SOCIAL WORKER

## 2023-08-31 NOTE — PROGRESS NOTES
[facility]                                     Progress Note    Patient Name: Dalia Hart  Date: 2023         Service Type: Individual      Session Start Time: 2:33 PM Session started late due to [place] connection issues Session End Time:  3:19 PM     Session Length: 46 min (Session was 53+ minutes in length due to: content of session, short-term goal setting, progress review and scheduling)    Session #: 10    Attendees: Client attended alone    Service Modality: Phone Visit:      Provider verified identity through the following two step process.  Patient provided:  Patient  and Patient is known previously to provider     Telemedicine Visit: The patient's condition can be safely assessed and treated via synchronous audio and visual telemedicine encounter.      Reason for Telemedicine Visit: Patient convenience (e.g. access to timely appointments / distance to available provider)    Originating Site (Patient Location): Patient's home    Distant Site (Provider Location): Provider Remote Setting- Home Office    Consent:  The patient/guardian has verbally consented to: the potential risks and benefits of telemedicine (video visit) versus in person care; bill my insurance or make self-payment for services provided; and responsibility for payment of non-covered services.   Distant Location (Provider):  Off-site    As the provider I attest to compliance with applicable laws and regulations related to telemedicine.    DATA  Interactive Complexity: No  Crisis: No        Progress Since Last Session (Related to Symptoms / Goals / Homework):   Symptoms: No change in symptoms reported.   Patient reports that she is is physically feeling better.     Homework: Partially completed      Episode of Care Goals: Satisfactory progress - ACTION (Actively working towards change); Intervened by reinforcing change plan / affirming steps taken     Current / Ongoing Stressors and Concerns:   The  patient continues to presents with depression and adjustment disorder symptoms. The patient identified the following stressors/concerns: she recently had been feeling sick with a cold and interpersonal stress. The patient processed through her thoughts and emotions related to: her job, her recent breakup, dating, monogamy, her recent interpersonal relationships, and her birthday. Patient and therapist continue to discussed the benefits of: healthy interpersonal boundaries, regular physical activity and self-care.         Treatment Objective(s) Addressed in This Session:   Continued to address:   Patient will identify triggers/ fears / thoughts that contribute to feeling anxious   Patient will use 1 or more coping skills for anxiety management each week.    Patient will practice setting boundaries 1 or more times each week.      Intervention:   Client Centered   Psychodynamic   Psycho-education    Validation and Normalization   Motivational Interviewing   Co-develop short-term goals and review progress in session.    Assessments completed prior to visit: None  The following assessments were completed by patient for this visit: None      ASSESSMENT: Current Emotional / Mental Status (status of significant symptoms):   Risk status (Self / Other harm or suicidal ideation)   Patient denies current fears or concerns for personal safety.   Patient denies current or recent suicidal ideation or behaviors.   Patient denies current or recent homicidal ideation or behaviors.   Patient denies current or recent self injurious behavior or ideation.   Patient denies other safety concerns.   Patient reports there has been no change in risk factors since their last session.     Patient reports there has been no change in protective factors since their last session.     Recommended that patient call 911 or go to the local ED should there be a change in any of these risk factors.     Appearance:   Appropriate    Eye Contact:   Good  "   Psychomotor Behavior: Normal    Attitude:   Cooperative  Interested Friendly Pleasant Attentive   Orientation:   Person Place Time Situation All   Speech    Rate / Production: Normal/ Responsive    Volume:  Normal    Mood:    Anxious  Depressed  Normal   Affect:    Appropriate    Thought Content:  Clear    Thought Form:  Coherent  Goal Directed  Logical    Insight:    Good      Medication Review:   No current psychiatric medications prescribed     Medication Compliance:   NA     Changes in Health Issues:   None reported     Chemical Use Review:   Substance Use: Chemical use reviewed, no active concerns identified      Tobacco Use: Not addressed in visit.     Diagnosis:  No diagnosis found.      Collateral Reports Completed:   Not Applicable    PLAN: (Patient Tasks / Therapist Tasks / Other)  Patient plans to journal what she really wants in life.   Patient plans to \"move around every day\"/\"do something active every day\".   Patient plans to organize 1 small area a day.   Patient plans to check-in with her own energy each morning and after her interpersonal interactions.  Patient plans to spend time with the people who make her feel good.   Patient plans to return for follow up: 9/07/2023      In a future session: patient plans to make a strict schedule so that she doesn't overwork herself. Pattient plans to come up with a plan to manage her SAD      Aidee ACEVEDO Sushila, [facility]                                                         ______________________________________________________________________    Individual Treatment Plan    Patient's Name: Dalia Hart  YOB: 1970    Date of Creation: 7/13/2023  Date Treatment Plan Last Reviewed/Revised: 7/13/2023    DSM5 Diagnoses:   1. Major depressive disorder, recurrent episode, moderate (H)    2. Adjustment disorder with anxious mood      Psychosocial / Contextual Factors: Patient identified the following stressors/concerns: financial, patient " recently lost her job, and parenting stress. Patent report that she is not getting a lot of sleep and reports that she does not always get regular sleep.   PROMIS (reviewed every 90 days): 26    Referral / Collaboration:  Referral to another professional/service is not indicated at this time..    Anticipated number of session for this episode of care: 9-12 sessions  Anticipation frequency of session: Weekly  Anticipated Duration of each session: 38-52 minutes  Treatment plan will be reviewed in 90 days or when goals have been changed.       MeasurableTreatment Goal(s) related to diagnosis / functional impairment(s)  Goal 1: Decrease severity of depression symptoms.       Objective #A (Patient Action)    Patient will Decrease frequency and intensity of feeling down, depressed, hopeless.  Status: New - Date: 7/13/2023      Intervention(s)  Client Centered  CBT  Psycho-dynamic  Internal Family Systems  Psycho-education    Objective #B  Patient will Improve quantity and quality of night time sleep / decrease daytime naps.  Status: New - Date: 7/13/2023      Intervention(s)  Client Centered  CBT  Internal Family Systems  Psycho-education  Mindulness    Objective #C  Patient will Identify negative self-talk and behaviors: challenge core beliefs, myths, and actions.  Status: New - Date: 7/13/2023      Intervention(s)  Client Centered  CBT  Internal Family Systems  Psycho-education      Goal 2: Decrease severity of anxiety related symptoms.       Objective #A (Patient Action)    Status: New - Date: 7/13/2023      Patient will identify triggers/ fears / thoughts that contribute to feeling anxious.    Intervention(s)  Psychodynamic  CBT  Internal Family Systems    Objective #B  Patient will use 1 or more coping skills for anxiety management each week.   Status: New - Date: 7/13/2023      Intervention(s)  Therapist will teach emotional regulation skills. (mindfulness, breathing techniques, progressive muscle relaxation)  .  Co-develop short-term goals and review progress in session.   CBT  Internal Family Systems  Mindfulness      Goal 3: Improve interpersonal relationships.       Objective #A (Patient Action)    Status: New - Date: 7/13/2023      Patient will compile a list of boundaries that they would like to set with others.    Intervention(s)  Client Centered  Motivational Interviewing  Psycho-education  Psychodynamic  Internal Family Systems.     Objective #B  Patient will practice setting boundaries 1 or more times each week.    Status: New - Date: 7/13/2023      Intervention(s)  Client Centered  Motivational Interviewing  Co-develop short-term goals and review progress in session.         Patient has reviewed and agreed to the above plan.      Aidee Conti, [facility]  July 13, 2023

## 2023-09-07 ENCOUNTER — VIRTUAL VISIT (OUTPATIENT)
Dept: PSYCHOLOGY | Facility: CLINIC | Age: 53
End: 2023-09-07
Payer: COMMERCIAL

## 2023-09-07 DIAGNOSIS — F43.22 ADJUSTMENT DISORDER WITH ANXIOUS MOOD: ICD-10-CM

## 2023-09-07 DIAGNOSIS — F33.1 MAJOR DEPRESSIVE DISORDER, RECURRENT EPISODE, MODERATE (H): Primary | ICD-10-CM

## 2023-09-07 PROCEDURE — 90834 PSYTX W PT 45 MINUTES: CPT | Mod: VID | Performed by: SOCIAL WORKER

## 2023-09-07 NOTE — PROGRESS NOTES
[facility]                                     Progress Note    Patient Name: Dalia Hart  Date: 2023       Service Type: Individual      Session Start Time: 2:39 PM Session started late due to [place] connection issues Session End Time:  3:29PM     Session Length: 50 min     Session #: 11    Attendees: Client attended alone    Service Modality: Phone Visit:      Provider verified identity through the following two step process.  Patient provided:  Patient  and Patient is known previously to provider     Telemedicine Visit: The patient's condition can be safely assessed and treated via synchronous audio and visual telemedicine encounter.      Reason for Telemedicine Visit: Patient convenience (e.g. access to timely appointments / distance to available provider)    Originating Site (Patient Location): Patient's home    Distant Site (Provider Location): Provider Remote Setting- Home Office    Consent:  The patient/guardian has verbally consented to: the potential risks and benefits of telemedicine (video visit) versus in person care; bill my insurance or make self-payment for services provided; and responsibility for payment of non-covered services.   Distant Location (Provider):  Off-site    As the provider I attest to compliance with applicable laws and regulations related to telemedicine.    DATA  Interactive Complexity: No  Crisis: No        Progress Since Last Session (Related to Symptoms / Goals / Homework):   Symptoms: No change in symptoms reported.   Patient reports that she is is physically feeling better.     Homework: Partially completed      Episode of Care Goals: Satisfactory progress - ACTION (Actively working towards change); Intervened by reinforcing change plan / affirming steps taken     Current / Ongoing Stressors and Concerns:   The patient continues to presents with depression and adjustment disorder symptoms. The patient identified the following  stressors/concerns: work related stress, interpersonal stress  The patient processed through her thoughts and emotions related to: her job, her interpersonal relationships, her recent interpersonal interactions, dating, and her birthday. Patient and therapist continue to discussed the benefits of: establishing and maintaining healthy interpersonal boundaries, regular physical activity and self-care.         Treatment Objective(s) Addressed in This Session:   Continued to address:   Patient will identify triggers/ fears / thoughts that contribute to feeling anxious   Patient will use 1 or more coping skills for anxiety management each week.    Patient will practice setting boundaries 1 or more times each week.      Intervention:   Client Centered   Psychodynamic   Psycho-education    Integrative Psychotherapy   Validation and Normalization   Motivational Interviewing   Co-develop short-term goals and review progress in session.    Assessments completed prior to visit: None  The following assessments were completed by patient for this visit: None      ASSESSMENT: Current Emotional / Mental Status (status of significant symptoms):   Risk status (Self / Other harm or suicidal ideation)   Patient denies current fears or concerns for personal safety.   Patient denies current or recent suicidal ideation or behaviors.   Patient denies current or recent homicidal ideation or behaviors.   Patient denies current or recent self injurious behavior or ideation.   Patient denies other safety concerns.   Patient reports there has been no change in risk factors since their last session.     Patient reports there has been no change in protective factors since their last session.     Recommended that patient call 911 or go to the local ED should there be a change in any of these risk factors.     Appearance:   Appropriate    Eye Contact:   Good    Psychomotor Behavior: Normal    Attitude:   Cooperative  Interested Friendly Pleasant  Attentive   Orientation:   Person Place Time Situation All   Speech    Rate / Production: Normal/ Responsive    Volume:  Normal    Mood:    Anxious  Depressed  Normal   Affect:    Appropriate    Thought Content:  Clear    Thought Form:  Coherent  Goal Directed  Logical    Insight:    Good      Medication Review:   No current psychiatric medications prescribed     Medication Compliance:   NA     Changes in Health Issues:   None reported     Chemical Use Review:   Substance Use: Chemical use reviewed, no active concerns identified      Tobacco Use: Not addressed in visit.     Diagnosis:  1. Major depressive disorder, recurrent episode, moderate (H)    2. Adjustment disorder with anxious mood        Collateral Reports Completed:   Not Applicable    PLAN: (Patient Tasks / Therapist Tasks / Other)  Patient plans to get a massage.  Patient plans to do her PT exercises.  Patient plans to journal.  Patient plans to continue to clean.   Patient plans to check-in with her own energy each morning and after her interpersonal interactions.  Patient plans to spend time with the people who make her feel good.   Patient plans to return for follow up: 9/14/2023      In a future session: patient plans to make a strict schedule so that she doesn't overwork herself. Pattient plans to come up with a plan to manage her SAD      Aideeyovany Conti, [facility]                                                         ______________________________________________________________________    Individual Treatment Plan    Patient's Name: Dalia Hart  YOB: 1970    Date of Creation: 7/13/2023  Date Treatment Plan Last Reviewed/Revised: 7/13/2023    DSM5 Diagnoses:   1. Major depressive disorder, recurrent episode, moderate (H)    2. Adjustment disorder with anxious mood      Psychosocial / Contextual Factors: Patient identified the following stressors/concerns: financial, patient recently lost her job, and parenting stress. Patent  report that she is not getting a lot of sleep and reports that she does not always get regular sleep.   PROMIS (reviewed every 90 days): 26    Referral / Collaboration:  Referral to another professional/service is not indicated at this time..    Anticipated number of session for this episode of care: 9-12 sessions  Anticipation frequency of session: Weekly  Anticipated Duration of each session: 38-52 minutes  Treatment plan will be reviewed in 90 days or when goals have been changed.       MeasurableTreatment Goal(s) related to diagnosis / functional impairment(s)  Goal 1: Decrease severity of depression symptoms.       Objective #A (Patient Action)    Patient will Decrease frequency and intensity of feeling down, depressed, hopeless.  Status: New - Date: 7/13/2023      Intervention(s)  Client Centered  CBT  Psycho-dynamic  Internal Family Systems  Psycho-education    Objective #B  Patient will Improve quantity and quality of night time sleep / decrease daytime naps.  Status: New - Date: 7/13/2023      Intervention(s)  Client Centered  CBT  Internal Family Systems  Psycho-education  Mindulness    Objective #C  Patient will Identify negative self-talk and behaviors: challenge core beliefs, myths, and actions.  Status: New - Date: 7/13/2023      Intervention(s)  Client Centered  CBT  Internal Family Systems  Psycho-education      Goal 2: Decrease severity of anxiety related symptoms.       Objective #A (Patient Action)    Status: New - Date: 7/13/2023      Patient will identify triggers/ fears / thoughts that contribute to feeling anxious.    Intervention(s)  Psychodynamic  CBT  Internal Family Systems    Objective #B  Patient will use 1 or more coping skills for anxiety management each week.   Status: New - Date: 7/13/2023      Intervention(s)  Therapist will teach emotional regulation skills. (mindfulness, breathing techniques, progressive muscle relaxation) .  Co-develop short-term goals and review progress in  session.   CBT  Internal Family Systems  Mindfulness      Goal 3: Improve interpersonal relationships.       Objective #A (Patient Action)    Status: New - Date: 7/13/2023      Patient will compile a list of boundaries that they would like to set with others.    Intervention(s)  Client Centered  Motivational Interviewing  Psycho-education  Psychodynamic  Internal Family Systems.     Objective #B  Patient will practice setting boundaries 1 or more times each week.    Status: New - Date: 7/13/2023      Intervention(s)  Client Centered  Motivational Interviewing  Co-develop short-term goals and review progress in session.         Patient has reviewed and agreed to the above plan.      Aidee Conti, [facility]  July 13, 2023

## 2023-09-21 ENCOUNTER — VIRTUAL VISIT (OUTPATIENT)
Dept: PSYCHOLOGY | Facility: CLINIC | Age: 53
End: 2023-09-21
Payer: COMMERCIAL

## 2023-09-21 DIAGNOSIS — F33.1 MAJOR DEPRESSIVE DISORDER, RECURRENT EPISODE, MODERATE (H): ICD-10-CM

## 2023-09-21 DIAGNOSIS — F43.22 ADJUSTMENT DISORDER WITH ANXIOUS MOOD: Primary | ICD-10-CM

## 2023-09-21 PROCEDURE — 90837 PSYTX W PT 60 MINUTES: CPT | Mod: VID | Performed by: SOCIAL WORKER

## 2023-09-21 NOTE — PROGRESS NOTES
[facility]                                     Progress Note    Patient Name: Dalia Hart  Date: 2023         Service Type: Individual      Session Start Time: 11:12 AM Session started late due to [place] connection issues Session End Time:  12:17 PM     Session Length: 64 min     Session #: 12    Attendees: Client attended alone    Service Modality: Phone Visit:      Provider verified identity through the following two step process.  Patient provided:  Patient  and Patient is known previously to provider     Telemedicine Visit: The patient's condition can be safely assessed and treated via synchronous audio and visual telemedicine encounter.      Reason for Telemedicine Visit: Patient convenience (e.g. access to timely appointments / distance to available provider)    Originating Site (Patient Location): Patient's home    Distant Site (Provider Location): Provider Remote Setting- Home Office    Consent:  The patient/guardian has verbally consented to: the potential risks and benefits of telemedicine (video visit) versus in person care; bill my insurance or make self-payment for services provided; and responsibility for payment of non-covered services.   Distant Location (Provider):  Off-site    As the provider I attest to compliance with applicable laws and regulations related to telemedicine.    DATA  Interactive Complexity: No  Crisis: No        Progress Since Last Session (Related to Symptoms / Goals / Homework):   Symptoms: No change in symptoms reported.       Homework: Partially completed      Episode of Care Goals: Satisfactory progress - ACTION (Actively working towards change); Intervened by reinforcing change plan / affirming steps taken     Current / Ongoing Stressors and Concerns:   The patient continues to presents with depression and adjustment disorder symptoms. The patient identified the following stressors/concerns: work related stress and interpersonal  "stress. The patient reports feeling overwhelmed today which she attributes to work and \"all of the energy.\" The patient processed through her thoughts ans emotions related to: her job, her birthday, her recent dating experiences, her interpersonal relationships, and her future goals and aspirations. Patient and therapist continue to discussed the benefits of: maintaining healthy interpersonal boundaries, regular physical activity and self-care.         Treatment Objective(s) Addressed in This Session:   Continued to address:   Patient will identify triggers/ fears / thoughts that contribute to feeling anxious   Patient will use 1 or more coping skills for anxiety management each week.    Patient will practice setting boundaries 1 or more times each week.      Intervention:   Client Centered   Psychodynamic   Psycho-education    Integrative Psychotherapy   Validation and Normalization   Motivational Interviewing   Co-develop short-term goals and review progress in session.    Assessments completed prior to visit: None  The following assessments were completed by patient for this visit: None      ASSESSMENT: Current Emotional / Mental Status (status of significant symptoms):   Risk status (Self / Other harm or suicidal ideation)   Patient denies current fears or concerns for personal safety.   Patient denies current or recent suicidal ideation or behaviors.   Patient denies current or recent homicidal ideation or behaviors.   Patient denies current or recent self injurious behavior or ideation.   Patient denies other safety concerns.   Patient reports there has been no change in risk factors since their last session.     Patient reports there has been no change in protective factors since their last session.     Recommended that patient call 911 or go to the local ED should there be a change in any of these risk factors.     Appearance:   Appropriate    Eye Contact:   Good    Psychomotor Behavior: Normal "    Attitude:   Cooperative  Interested Friendly Pleasant Attentive   Orientation:   Person Place Time Situation All   Speech    Rate / Production: Normal/ Responsive    Volume:  Normal    Mood:    Anxious  Depressed  Normal   Affect:    Appropriate    Thought Content:  Clear    Thought Form:  Coherent  Goal Directed  Logical    Insight:    Good      Medication Review:   No current psychiatric medications prescribed     Medication Compliance:   NA     Changes in Health Issues:   None reported     Chemical Use Review:   Substance Use: Chemical use reviewed, no active concerns identified      Tobacco Use: Not addressed in visit.     Diagnosis:  1. Adjustment disorder with anxious mood    2. Major depressive disorder, recurrent episode, moderate (H)          Collateral Reports Completed:   Not Applicable    PLAN: (Patient Tasks / Therapist Tasks / Other  Patient plans to journal.  Patient plans to continue to clean/organize/de-clutter.   Patient plans to check-in with her own energy each morning and after her interpersonal interactions.  Patient plans to spend time with the people who make her feel good.   Patient plans to return for follow up: 9/28/2023      In a future session: patient plans to make a strict schedule so that she doesn't overwork herself. Pattient plans to come up with a plan to manage her SAD      Aidee CURTIS Conti, [facility]                                                         ______________________________________________________________________    Individual Treatment Plan    Patient's Name: Dalia Hart  YOB: 1970    Date of Creation: 7/13/2023  Date Treatment Plan Last Reviewed/Revised: 7/13/2023    DSM5 Diagnoses:   1. Major depressive disorder, recurrent episode, moderate (H)    2. Adjustment disorder with anxious mood      Psychosocial / Contextual Factors: Patient identified the following stressors/concerns: financial, patient recently lost her job, and parenting stress.  Patent report that she is not getting a lot of sleep and reports that she does not always get regular sleep.   PROMIS (reviewed every 90 days): 26    Referral / Collaboration:  Referral to another professional/service is not indicated at this time..    Anticipated number of session for this episode of care: 9-12 sessions  Anticipation frequency of session: Weekly  Anticipated Duration of each session: 38-52 minutes  Treatment plan will be reviewed in 90 days or when goals have been changed.       MeasurableTreatment Goal(s) related to diagnosis / functional impairment(s)  Goal 1: Decrease severity of depression symptoms.       Objective #A (Patient Action)    Patient will Decrease frequency and intensity of feeling down, depressed, hopeless.  Status: New - Date: 7/13/2023      Intervention(s)  Client Centered  CBT  Psycho-dynamic  Internal Family Systems  Psycho-education    Objective #B  Patient will Improve quantity and quality of night time sleep / decrease daytime naps.  Status: New - Date: 7/13/2023      Intervention(s)  Client Centered  CBT  Internal Family Systems  Psycho-education  Mindulness    Objective #C  Patient will Identify negative self-talk and behaviors: challenge core beliefs, myths, and actions.  Status: New - Date: 7/13/2023      Intervention(s)  Client Centered  CBT  Internal Family Systems  Psycho-education      Goal 2: Decrease severity of anxiety related symptoms.       Objective #A (Patient Action)    Status: New - Date: 7/13/2023      Patient will identify triggers/ fears / thoughts that contribute to feeling anxious.    Intervention(s)  Psychodynamic  CBT  Internal Family Systems    Objective #B  Patient will use 1 or more coping skills for anxiety management each week.   Status: New - Date: 7/13/2023      Intervention(s)  Therapist will teach emotional regulation skills. (mindfulness, breathing techniques, progressive muscle relaxation) .  Co-develop short-term goals and review progress  in session.   CBT  Internal Family Systems  Mindfulness      Goal 3: Improve interpersonal relationships.       Objective #A (Patient Action)    Status: New - Date: 7/13/2023      Patient will compile a list of boundaries that they would like to set with others.    Intervention(s)  Client Centered  Motivational Interviewing  Psycho-education  Psychodynamic  Internal Family Systems.     Objective #B  Patient will practice setting boundaries 1 or more times each week.    Status: New - Date: 7/13/2023      Intervention(s)  Client Centered  Motivational Interviewing  Co-develop short-term goals and review progress in session.         Patient has reviewed and agreed to the above plan.      Aidee Conti, [facility]  July 13, 2023

## 2023-09-27 ENCOUNTER — OFFICE VISIT (OUTPATIENT)
Dept: URGENT CARE | Facility: URGENT CARE | Age: 53
End: 2023-09-27
Payer: COMMERCIAL

## 2023-09-27 VITALS
RESPIRATION RATE: 16 BRPM | OXYGEN SATURATION: 99 % | TEMPERATURE: 98.2 F | WEIGHT: 209 LBS | DIASTOLIC BLOOD PRESSURE: 66 MMHG | HEART RATE: 67 BPM | BODY MASS INDEX: 33.73 KG/M2 | SYSTOLIC BLOOD PRESSURE: 99 MMHG

## 2023-09-27 DIAGNOSIS — R30.0 DYSURIA: ICD-10-CM

## 2023-09-27 DIAGNOSIS — Z11.3 SCREEN FOR STD (SEXUALLY TRANSMITTED DISEASE): ICD-10-CM

## 2023-09-27 DIAGNOSIS — R07.0 THROAT PAIN: ICD-10-CM

## 2023-09-27 DIAGNOSIS — N30.01 ACUTE CYSTITIS WITH HEMATURIA: Primary | ICD-10-CM

## 2023-09-27 LAB
ALBUMIN UR-MCNC: NEGATIVE MG/DL
APPEARANCE UR: CLEAR
BACTERIA #/AREA URNS HPF: ABNORMAL /HPF
BILIRUB UR QL STRIP: NEGATIVE
CLUE CELLS: ABNORMAL
COLOR UR AUTO: YELLOW
DEPRECATED S PYO AG THROAT QL EIA: NEGATIVE
GLUCOSE UR STRIP-MCNC: NEGATIVE MG/DL
HGB UR QL STRIP: ABNORMAL
KETONES UR STRIP-MCNC: NEGATIVE MG/DL
LEUKOCYTE ESTERASE UR QL STRIP: NEGATIVE
NITRATE UR QL: NEGATIVE
PH UR STRIP: 6 [PH] (ref 5–7)
RBC #/AREA URNS AUTO: ABNORMAL /HPF
SP GR UR STRIP: >=1.03 (ref 1–1.03)
SQUAMOUS #/AREA URNS AUTO: ABNORMAL /LPF
TRICHOMONAS, WET PREP: ABNORMAL
UROBILINOGEN UR STRIP-ACNC: 0.2 E.U./DL
WBC #/AREA URNS AUTO: ABNORMAL /HPF
WBC'S/HIGH POWER FIELD, WET PREP: ABNORMAL
YEAST, WET PREP: ABNORMAL

## 2023-09-27 PROCEDURE — 87086 URINE CULTURE/COLONY COUNT: CPT | Performed by: PHYSICIAN ASSISTANT

## 2023-09-27 PROCEDURE — 87651 STREP A DNA AMP PROBE: CPT | Performed by: PHYSICIAN ASSISTANT

## 2023-09-27 PROCEDURE — 87591 N.GONORRHOEAE DNA AMP PROB: CPT | Performed by: PHYSICIAN ASSISTANT

## 2023-09-27 PROCEDURE — 87491 CHLMYD TRACH DNA AMP PROBE: CPT | Performed by: PHYSICIAN ASSISTANT

## 2023-09-27 PROCEDURE — 81001 URINALYSIS AUTO W/SCOPE: CPT | Performed by: PHYSICIAN ASSISTANT

## 2023-09-27 PROCEDURE — 87186 SC STD MICRODIL/AGAR DIL: CPT | Performed by: PHYSICIAN ASSISTANT

## 2023-09-27 PROCEDURE — 87210 SMEAR WET MOUNT SALINE/INK: CPT | Performed by: PHYSICIAN ASSISTANT

## 2023-09-27 PROCEDURE — 99214 OFFICE O/P EST MOD 30 MIN: CPT | Performed by: PHYSICIAN ASSISTANT

## 2023-09-27 RX ORDER — FLUTICASONE PROPIONATE 50 MCG
1 SPRAY, SUSPENSION (ML) NASAL DAILY
Qty: 15.8 ML | Refills: 0 | Status: SHIPPED | OUTPATIENT
Start: 2023-09-27 | End: 2024-02-05

## 2023-09-27 RX ORDER — SULFAMETHOXAZOLE/TRIMETHOPRIM 800-160 MG
1 TABLET ORAL 2 TIMES DAILY
Qty: 14 TABLET | Refills: 0 | Status: SHIPPED | OUTPATIENT
Start: 2023-09-27 | End: 2023-10-04

## 2023-09-27 RX ORDER — CEFDINIR 300 MG/1
300 CAPSULE ORAL 2 TIMES DAILY
Qty: 20 CAPSULE | Refills: 0 | Status: SHIPPED | OUTPATIENT
Start: 2023-09-27 | End: 2023-09-27

## 2023-09-28 ENCOUNTER — VIRTUAL VISIT (OUTPATIENT)
Dept: PSYCHOLOGY | Facility: CLINIC | Age: 53
End: 2023-09-28
Payer: COMMERCIAL

## 2023-09-28 DIAGNOSIS — F33.1 MAJOR DEPRESSIVE DISORDER, RECURRENT EPISODE, MODERATE (H): ICD-10-CM

## 2023-09-28 DIAGNOSIS — F43.22 ADJUSTMENT DISORDER WITH ANXIOUS MOOD: Primary | ICD-10-CM

## 2023-09-28 LAB — GROUP A STREP BY PCR: NOT DETECTED

## 2023-09-28 PROCEDURE — 90832 PSYTX W PT 30 MINUTES: CPT | Mod: 95 | Performed by: SOCIAL WORKER

## 2023-09-28 NOTE — PROGRESS NOTES
SUBJECTIVE:   Dalia Hart is a 53 year old female who  presents today for a possible UTI. Symptoms of dysuria, urgency, frequency, and burning have been going on for 4day(s).  Hematuria no.  still presentand mild.  There is no history of fever, chills, nausea or vomiting.  No history of vaginal or penile discharge. This patient does have a history of urinary tract infections. Patient denies long duration, rigors, flank pain, temperature > 101 degrees F., Vomiting, significant nausea or diarrhea, taking Coumadin, and GFR less than 30 within the last year or vaginal discharge, vaginal odor, vaginal itching, and dyspareunia (pain in labia/pelvis)     Past Medical History:   Diagnosis Date    Morbid obesity (H)      Current Outpatient Medications   Medication Sig Dispense Refill    cefdinir (OMNICEF) 300 MG capsule Take 1 capsule (300 mg) by mouth 2 times daily for 10 days 20 capsule 0    cetirizine (ZYRTEC) 10 MG tablet Take 1 tablet (10 mg) by mouth daily 30 tablet 3    estradiol (ESTRACE) 0.1 MG/GM vaginal cream       fluticasone (FLONASE) 50 MCG/ACT nasal spray Spray 1 spray into both nostrils daily 18.2 mL 1    fluticasone (FLONASE) 50 MCG/ACT nasal spray Spray 2 sprays into both nostrils daily 18 mL 11    metroNIDAZOLE (METROGEL) 0.75 % vaginal gel       mometasone (NASONEX) 50 MCG/ACT nasal spray Spray 2 sprays into both nostrils daily 17 g 0    nicotine polacrilex (NICORETTE) 2 MG gum 1 piece of gum every 1-2 hours as needed for smoking cessation 100 each 1    nitroFURantoin macrocrystal-monohydrate (MACROBID) 100 MG capsule       sulfamethoxazole-trimethoprim (BACTRIM DS) 800-160 MG tablet       tretinoin (RETIN-A) 0.025 % external cream Apply topically to face nightly.  Start slowly and titrate up to nightly as tolerated      valACYclovir (VALTREX) 1000 mg tablet       aspirin 81 MG chewable tablet Take 81 mg by mouth daily      doxycycline hyclate (VIBRAMYCIN) 100 MG capsule       fluconazole (DIFLUCAN)  150 MG tablet       fluconazole (DIFLUCAN) 150 MG tablet Take 150 mg by mouth (Patient not taking: Reported on 7/15/2023)      MICONAZOLE 7 2 % cream INSERT 1 APPLICATORFUL VAGINALLY AT BEDTIME FOR 7 DAYS      phenazopyridine (PYRIDIUM) 100 MG tablet        Social History     Tobacco Use    Smoking status: Some Days     Packs/day: 0.25     Years: 11.00     Pack years: 2.75     Types: Cigarettes    Smokeless tobacco: Never    Tobacco comments:     1 pack per month   Substance Use Topics    Alcohol use: No     Alcohol/week: 0.0 standard drinks of alcohol       ROS:   Review of systems negative except as stated above.    OBJECTIVE:  BP 99/66   Pulse 67   Temp 98.2  F (36.8  C)   Resp 16   Wt 94.8 kg (209 lb)   SpO2 99%   BMI 33.73 kg/m    GENERAL APPEARANCE: healthy, alert and no distress  RESP: lungs clear to auscultation - no rales, rhonchi or wheezes  CV: regular rates and rhythm, normal S1 S2, no murmur noted  BACK: No CVA tenderness  SKIN: no suspicious lesions or rashes      Results for orders placed or performed in visit on 09/27/23   UA Macroscopic with reflex to Microscopic and Culture - Clinic Collect     Status: Abnormal    Specimen: Urine, Midstream   Result Value Ref Range    Color Urine Yellow Colorless, Straw, Light Yellow, Yellow    Appearance Urine Clear Clear    Glucose Urine Negative Negative mg/dL    Bilirubin Urine Negative Negative    Ketones Urine Negative Negative mg/dL    Specific Gravity Urine >=1.030 1.003 - 1.035    Blood Urine Trace (A) Negative    pH Urine 6.0 5.0 - 7.0    Protein Albumin Urine Negative Negative mg/dL    Urobilinogen Urine 0.2 0.2, 1.0 E.U./dL    Nitrite Urine Negative Negative    Leukocyte Esterase Urine Negative Negative   UA Microscopic with Reflex to Culture     Status: Abnormal   Result Value Ref Range    Bacteria Urine Many (A) None Seen /HPF    RBC Urine 0-2 0-2 /HPF /HPF    WBC Urine 10-25 (A) 0-5 /HPF /HPF    Squamous Epithelials Urine Few (A) None Seen /LPF    Streptococcus A Rapid Screen w/Reflex to PCR - Clinic Collect     Status: Normal    Specimen: Throat; Swab   Result Value Ref Range    Group A Strep antigen Negative Negative   Wet prep - Clinic Collect     Status: Abnormal    Specimen: Vagina; Swab   Result Value Ref Range    Trichomonas Absent Absent    Yeast Absent Absent    Clue Cells Absent Absent    WBCs/high power field 3+ (A) None       ASSESSMENT:   (N30.01) Acute cystitis with hematuria  (primary encounter diagnosis)  Plan: sulfamethoxazole-trimethoprim (BACTRIM DS)         800-160 MG tablet      (R30.0) Dysuria  Plan: UA Macroscopic with reflex to Microscopic and         Culture - Clinic Collect, Wet prep - Clinic         Collect, UA Microscopic with Reflex to Culture,        Urine Culture, DISCONTINUED: cefdinir (OMNICEF)        300 MG capsule, CANCELED: Urine Culture Aerobic        Bacterial - lab collect      (Z11.3) Screen for STD (sexually transmitted disease)  Plan: Chlamydia trachomatis PCR Urine, Neisseria         gonorrhoeae PCR Urine [[id number]]       (R07.0) Throat pain  Plan: Streptococcus A Rapid Screen w/Reflex to PCR -         Clinic Collect, Group A Streptococcus PCR         Throat Swab, fluticasone (FLONASE) 50 MCG/ACT         nasal spray      Follow up with PCP if symptoms worsen or fail to improve

## 2023-09-28 NOTE — PROGRESS NOTES
[facility]                                     Progress Note    Patient Name: Dalia Hart  Date: 2023         Service Type: Individual      Session Start Time: 11:10 AM Session started late due to [place] connection issues Session End Time:  11:46 AM     Session Length:  36 min     Session #: 13    Attendees: Client attended alone    Service Modality: Phone Visit:      Provider verified identity through the following two step process.  Patient provided:  Patient  and Patient is known previously to provider     Telemedicine Visit: The patient's condition can be safely assessed and treated via synchronous audio and visual telemedicine encounter.      Reason for Telemedicine Visit: Patient convenience (e.g. access to timely appointments / distance to available provider)    Originating Site (Patient Location): Patient's home    Distant Site (Provider Location): Provider Remote Setting- Home Office    Consent:  The patient/guardian has verbally consented to: the potential risks and benefits of telemedicine (video visit) versus in person care; bill my insurance or make self-payment for services provided; and responsibility for payment of non-covered services.   Distant Location (Provider):  Off-site    As the provider I attest to compliance with applicable laws and regulations related to telemedicine.    DATA  Interactive Complexity: No  Crisis: No        Progress Since Last Session (Related to Symptoms / Goals / Homework):   Symptoms: No change in symptoms reported.       Homework: Partially completed      Episode of Care Goals: Satisfactory progress - ACTION (Actively working towards change); Intervened by reinforcing change plan / affirming steps taken     Current / Ongoing Stressors and Concerns:   The patient continues to presents with depression and adjustment disorder symptoms. The patient identified the following stressors/concerns: work related stress, her daughter is  "studying abroad and has COVID-19, interpersonal stress. The patient states: \"things are going really good.\"The patient processed through her thoughts and emotions related to: her recent experiences at work, the culture of her work place, her recent interpersonal interactions, and her recent parenting experiences. Patient and therapist continue to discussed the benefits of: maintaining healthy interpersonal boundaries and self-care.         Treatment Objective(s) Addressed in This Session:   Continued to address:   Patient will identify triggers/ fears / thoughts that contribute to feeling anxious   Patient will use 1 or more coping skills for anxiety management each week.    Patient will practice setting boundaries 1 or more times each week.      Intervention:   Client Centered   Psychodynamic   Psycho-education    Integrative Psychotherapy   Validation and Normalization   Co-develop short-term goals and review progress in session.    Assessments completed prior to visit: None  The following assessments were completed by patient for this visit: None      ASSESSMENT: Current Emotional / Mental Status (status of significant symptoms):   Risk status (Self / Other harm or suicidal ideation)   Patient denies current fears or concerns for personal safety.   Patient denies current or recent suicidal ideation or behaviors.   Patient denies current or recent homicidal ideation or behaviors.   Patient denies current or recent self injurious behavior or ideation.   Patient denies other safety concerns.   Patient reports there has been no change in risk factors since their last session.     Patient reports there has been no change in protective factors since their last session.     Recommended that patient call 911 or go to the local ED should there be a change in any of these risk factors.     Appearance:   Appropriate    Eye Contact:   Good    Psychomotor Behavior: Normal    Attitude:   Cooperative  Interested Friendly " Pleasant Attentive   Orientation:   Person Place Time Situation All   Speech    Rate / Production: Normal/ Responsive    Volume:  Normal    Mood:    Anxious  Depressed  Normal   Affect:    Appropriate    Thought Content:  Clear    Thought Form:  Coherent  Goal Directed  Logical    Insight:    Good      Medication Review:   No current psychiatric medications prescribed     Medication Compliance:   NA     Changes in Health Issues:   None reported     Chemical Use Review:   Substance Use: Chemical use reviewed, no active concerns identified      Tobacco Use: Not addressed in visit.     Diagnosis:  1. Adjustment disorder with anxious mood    2. Major depressive disorder, recurrent episode, moderate (H)        Collateral Reports Completed:   Not Applicable    PLAN: (Patient Tasks / Therapist Tasks / Other  Patient plans to journal 3/week for 5 minutes or more.  Patient plans to continue to clean/organize/de-clutter.   Patient plans to check-in with her own energy each morning and after her interpersonal interactions.  Patient plans to continue to maintain healthy interpersonal boundaries.   Patient plans to return for follow up: 10/05/2023      In a future session: patient plans to make a strict schedule so that she doesn't overwork herself. Pattient plans to come up with a plan to manage her SAD      Aideeyovany Conti, [facility]                                                         ______________________________________________________________________    Individual Treatment Plan    Patient's Name: Dalia Hart  YOB: 1970    Date of Creation: 7/13/2023  Date Treatment Plan Last Reviewed/Revised: 7/13/2023    DSM5 Diagnoses:   1. Major depressive disorder, recurrent episode, moderate (H)    2. Adjustment disorder with anxious mood      Psychosocial / Contextual Factors: Patient identified the following stressors/concerns: financial, patient recently lost her job, and parenting stress. Patent report that  she is not getting a lot of sleep and reports that she does not always get regular sleep.   PROMIS (reviewed every 90 days): 26    Referral / Collaboration:  Referral to another professional/service is not indicated at this time..    Anticipated number of session for this episode of care: 9-12 sessions  Anticipation frequency of session: Weekly  Anticipated Duration of each session: 38-52 minutes  Treatment plan will be reviewed in 90 days or when goals have been changed.       MeasurableTreatment Goal(s) related to diagnosis / functional impairment(s)  Goal 1: Decrease severity of depression symptoms.       Objective #A (Patient Action)    Patient will Decrease frequency and intensity of feeling down, depressed, hopeless.  Status: New - Date: 7/13/2023      Intervention(s)  Client Centered  CBT  Psycho-dynamic  Internal Family Systems  Psycho-education    Objective #B  Patient will Improve quantity and quality of night time sleep / decrease daytime naps.  Status: New - Date: 7/13/2023      Intervention(s)  Client Centered  CBT  Internal Family Systems  Psycho-education  Mindulness    Objective #C  Patient will Identify negative self-talk and behaviors: challenge core beliefs, myths, and actions.  Status: New - Date: 7/13/2023      Intervention(s)  Client Centered  CBT  Internal Family Systems  Psycho-education      Goal 2: Decrease severity of anxiety related symptoms.       Objective #A (Patient Action)    Status: New - Date: 7/13/2023      Patient will identify triggers/ fears / thoughts that contribute to feeling anxious.    Intervention(s)  Psychodynamic  CBT  Internal Family Systems    Objective #B  Patient will use 1 or more coping skills for anxiety management each week.   Status: New - Date: 7/13/2023      Intervention(s)  Therapist will teach emotional regulation skills. (mindfulness, breathing techniques, progressive muscle relaxation) .  Co-develop short-term goals and review progress in session.    CBT  Internal Family Systems  Mindfulness      Goal 3: Improve interpersonal relationships.       Objective #A (Patient Action)    Status: New - Date: 7/13/2023      Patient will compile a list of boundaries that they would like to set with others.    Intervention(s)  Client Centered  Motivational Interviewing  Psycho-education  Psychodynamic  Internal Family Systems.     Objective #B  Patient will practice setting boundaries 1 or more times each week.    Status: New - Date: 7/13/2023      Intervention(s)  Client Centered  Motivational Interviewing  Co-develop short-term goals and review progress in session.         Patient has reviewed and agreed to the above plan.      Aidee Conti, [facility]  July 13, 2023

## 2023-09-29 ENCOUNTER — NURSE TRIAGE (OUTPATIENT)
Dept: NURSING | Facility: CLINIC | Age: 53
End: 2023-09-29
Payer: COMMERCIAL

## 2023-09-29 LAB
BACTERIA UR CULT: ABNORMAL
C TRACH DNA SPEC QL NAA+PROBE: NEGATIVE
N GONORRHOEA DNA SPEC QL NAA+PROBE: NEGATIVE

## 2023-09-29 NOTE — TELEPHONE ENCOUNTER
Reason for Disposition   COVID-19 diagnosed by positive lab test (e.g., PCR, rapid self-test kit) and mild symptoms (e.g., cough, fever, others) and no complications or SOB    Additional Information   Negative: SEVERE difficulty breathing (e.g., struggling for each breath, speaks in single words)   Negative: Difficult to awaken or acting confused (e.g., disoriented, slurred speech)   Negative: Bluish (or gray) lips or face now   Negative: Shock suspected (e.g., cold/pale/clammy skin, too weak to stand, low BP, rapid pulse)   Negative: Sounds like a life-threatening emergency to the triager   Negative: SEVERE or constant chest pain or pressure  (Exception: Mild central chest pain, present only when coughing.)   Negative: MODERATE difficulty breathing (e.g., speaks in phrases, SOB even at rest, pulse 100-120)   Negative: Headache and stiff neck (can't touch chin to chest)   Negative: Oxygen level (e.g., pulse oximetry) 90% or lower   Negative: Chest pain or pressure  (Exception: MILD central chest pain, present only when coughing.)   Negative: Drinking very little and dehydration suspected (e.g., no urine > 12 hours, very dry mouth, very lightheaded)   Negative: Patient sounds very sick or weak to the triager   Negative: MILD difficulty breathing (e.g., minimal/no SOB at rest, SOB with walking, pulse <100)   Negative: Fever > 103 F (39.4 C)   Negative: Fever > 101 F (38.3 C) and over 60 years of age   Negative: Fever > 100.0 F (37.8 C) and bedridden (e.g., CVA, chronic illness, recovering from surgery)   Negative: HIGH RISK patient (e.g., weak immune system, age > 64 years, obesity with BMI of 30 or higher, pregnant, chronic lung disease or other chronic medical condition) and COVID symptoms (e.g., cough, fever)  (Exceptions: Already seen by doctor or NP/PA and no new or worsening symptoms.)   Negative: HIGH RISK patient and influenza is widespread in the community and ONE OR MORE respiratory symptoms: cough, sore  throat, runny or stuffy nose   Negative: HIGH RISK patient and influenza exposure within the last 7 days and ONE OR MORE respiratory symptoms: cough, sore throat, runny or stuffy nose   Negative: Oxygen level (e.g., pulse oximetry) 91 to 94%   Negative: COVID-19 infection suspected by caller or triager and mild symptoms (cough, fever, or others) and negative COVID-19 rapid test   Negative: Fever present > 3 days (72 hours)   Negative: Fever returns after gone for over 24 hours and symptoms worse or not improved   Negative: Continuous (nonstop) coughing interferes with work or school and no improvement using cough treatment per Care Advice   Negative: Cough present > 3 weeks   Negative: COVID-19 diagnosed by positive lab test (e.g., PCR, rapid self-test kit) and NO symptoms (e.g., cough, fever, others)    Protocols used: Coronavirus (COVID-19) Diagnosed or [id number]    Pt has not taken a home COV. Test.     Current COVID symptoms: fever or chills, fatigue, and congestion or runny nose  Date COVID symptoms began: 9/27/23    Message should be routed to clinic RN pool. Best phone number to use for call back: [phone]     Nurse Triage SBAR    Is this a 2nd Level Triage? NO    Pt had a UTI.  Feeling better.  Decreased frequency and urgency.  Does not have a fever that she knows of.  Had some brief chills.  Fatigued.  Asked if it was COV.  Has nasal congestion.       Protocol Recommended Disposition:   Home Care    Recommendation: home care.  Can take a home COV test.  Can check myChart for STD results that are not back yet later.        Pt agrees.   Faiza Turpin, RN on 9/29/2023 at 11:04 AM

## 2023-10-05 NOTE — PATIENT INSTRUCTIONS
** FOLLOW UP PLAN**:    PLEASE SCHEDULE LABS WITH OFFICE VISIT 3 MONTHS FROM TODAY TO FOLLOW UP ON  FATIGUE, SLEEP STUDY RESULT, OTHER MEDICAL ISSUES, AND TO REVIEW TEST RESULTS       BE SURE TO SCHEDULE YOUR LAB DRAW APPOINTMENT FOR AT LEAST 1 WEEK BEFORE YOUR NEXT VISIT      YOU HAVE BEEN REFERRED FOR SLEEP STUDY TO ADDRESS ISSUES RAISED TODAY   PLEASE  MAKE SURE TO SCHEDULE YOUR APPOINTMENT(S) BY TELEPHONE      YOU MAY CONTACT THE CLINIC IF ANY QUESTIONS OR CONCERNS -[phone] OR VIA [employer]             
none

## 2023-10-12 ENCOUNTER — VIRTUAL VISIT (OUTPATIENT)
Dept: PSYCHOLOGY | Facility: CLINIC | Age: 53
End: 2023-10-12
Payer: COMMERCIAL

## 2023-10-12 DIAGNOSIS — F33.1 MAJOR DEPRESSIVE DISORDER, RECURRENT EPISODE, MODERATE (H): ICD-10-CM

## 2023-10-12 DIAGNOSIS — F43.22 ADJUSTMENT DISORDER WITH ANXIOUS MOOD: Primary | ICD-10-CM

## 2023-10-12 PROCEDURE — 90832 PSYTX W PT 30 MINUTES: CPT | Mod: 95 | Performed by: SOCIAL WORKER

## 2023-10-12 NOTE — PROGRESS NOTES
"[facility]                                     Progress Note    Patient Name: Dalia Hart  Date: 2023     Service Type: Individual      Session Start Time: 11:21 AM  Session End Time:  11:55 AM     Session Length:   34 min     Session #: 14    Attendees: Client attended alone    Service Modality: Phone Visit:      Provider verified identity through the following two step process.  Patient provided:  Patient  and Patient is known previously to provider     Telemedicine Visit: The patient's condition can be safely assessed and treated via synchronous audio and visual telemedicine encounter.      Reason for Telemedicine Visit: Patient convenience (e.g. access to timely appointments / distance to available provider)    Originating Site (Patient Location): Patient's home    Distant Site (Provider Location): Provider Remote Setting- Home Office    Consent:  The patient/guardian has verbally consented to: the potential risks and benefits of telemedicine (video visit) versus in person care; bill my insurance or make self-payment for services provided; and responsibility for payment of non-covered services.   Distant Location (Provider):  Off-site    As the provider I attest to compliance with applicable laws and regulations related to telemedicine.    DATA  Interactive Complexity: No  Crisis: No        Progress Since Last Session (Related to Symptoms / Goals / Homework):   Symptoms: No change in symptoms reported.       Homework: Partially completed      Episode of Care Goals: Satisfactory progress - ACTION (Actively working towards change); Intervened by reinforcing change plan / affirming steps taken     Current / Ongoing Stressors and Concerns:   The patient continues to presents with depression and adjustment disorder symptoms. The patient states: \"I think I am ok.\" The patient identified the following stressors/concerns: she and her significant other broke up over the weekend " - longest amount of time they have gone without speaking, work related stress and parenting related stress. Patient processed through her thoughts and emotions related to: her interpersonal relationships, her recent interpersonal interactions, dating, and work. Patient and therapist continue to discussed the benefits of: maintaining healthy interpersonal boundaries, grounding and emotional regulation techniques (diaphragmatic breathing).       Treatment Objective(s) Addressed in This Session:   Continued to address:   Patient will identify triggers/ fears / thoughts that contribute to feeling anxious   Patient will use 1 or more coping skills for anxiety management each week.    Patient will practice setting boundaries 1 or more times each week.      Intervention:   Client Centered   Psychodynamic   Psycho-education    Integrative Psychotherapy   Validation and Normalization   Co-develop short-term goals and review progress in session.    Assessments completed prior to visit: None  The following assessments were completed by patient for this visit: None     ASSESSMENT: Current Emotional / Mental Status (status of significant symptoms):   Risk status (Self / Other harm or suicidal ideation)   Patient denies current fears or concerns for personal safety.   Patient denies current or recent suicidal ideation or behaviors.   Patient denies current or recent homicidal ideation or behaviors.   Patient denies current or recent self injurious behavior or ideation.   Patient denies other safety concerns.   Patient reports there has been no change in risk factors since their last session.     Patient reports there has been no change in protective factors since their last session.     Recommended that patient call 911 or go to the local ED should there be a change in any of these risk factors.     Appearance:   Appropriate    Eye Contact:   Good    Psychomotor Behavior: Normal    Attitude:   Cooperative  Interested Friendly  Pleasant Attentive   Orientation:   Person Place Time Situation All   Speech    Rate / Production: Normal/ Responsive    Volume:  Normal    Mood:    Anxious  Normal Sad    Affect:    Appropriate    Thought Content:  Clear    Thought Form:  Coherent  Goal Directed  Logical    Insight:    Good      Medication Review:   No current psychiatric medications prescribed     Medication Compliance:   NA     Changes in Health Issues:   None reported     Chemical Use Review:   Substance Use: Chemical use reviewed, no active concerns identified      Tobacco Use: Not addressed in visit.     Diagnosis:  1. Adjustment disorder with anxious mood    2. Major depressive disorder, recurrent episode, moderate (H)          Collateral Reports Completed:   Not Applicable    PLAN: (Patient Tasks / Therapist Tasks / Other  Patient plans to journal 3/week for 5 minutes or more.  Patient plans to continue to clean/organize/de-clutter.   Patient plans to check-in with her own energy each morning and after her interpersonal interactions.  Patient plans to regulate her energy and be a calming presence.   Patient plans to continue to maintain healthy interpersonal boundaries.   Patient plans to return for follow up: 10/19/2023  Next session Update treatment plan    In a future session: patient plans to make a strict schedule so that she doesn't overwork herself. Pattient plans to come up with a plan to manage her SAD      Aidee ACEVEDO Conti, [facility]                                                         ______________________________________________________________________    Individual Treatment Plan    Patient's Name: Dalia Hart  YOB: 1970    Date of Creation: 7/13/2023  Date Treatment Plan Last Reviewed/Revised: 7/13/2023    DSM5 Diagnoses:   1. Major depressive disorder, recurrent episode, moderate (H)    2. Adjustment disorder with anxious mood      Psychosocial / Contextual Factors: Patient identified the following  stressors/concerns: financial, patient recently lost her job, and parenting stress. Patent report that she is not getting a lot of sleep and reports that she does not always get regular sleep.   PROMIS (reviewed every 90 days): 26    Referral / Collaboration:  Referral to another professional/service is not indicated at this time..    Anticipated number of session for this episode of care: 9-12 sessions  Anticipation frequency of session: Weekly  Anticipated Duration of each session: 38-52 minutes  Treatment plan will be reviewed in 90 days or when goals have been changed.       MeasurableTreatment Goal(s) related to diagnosis / functional impairment(s)  Goal 1: Decrease severity of depression symptoms.       Objective #A (Patient Action)    Patient will Decrease frequency and intensity of feeling down, depressed, hopeless.  Status: New - Date: 7/13/2023      Intervention(s)  Client Centered  CBT  Psycho-dynamic  Internal Family Systems  Psycho-education    Objective #B  Patient will Improve quantity and quality of night time sleep / decrease daytime naps.  Status: New - Date: 7/13/2023      Intervention(s)  Client Centered  CBT  Internal Family Systems  Psycho-education  Mindulness    Objective #C  Patient will Identify negative self-talk and behaviors: challenge core beliefs, myths, and actions.  Status: New - Date: 7/13/2023      Intervention(s)  Client Centered  CBT  Internal Family Systems  Psycho-education      Goal 2: Decrease severity of anxiety related symptoms.       Objective #A (Patient Action)    Status: New - Date: 7/13/2023      Patient will identify triggers/ fears / thoughts that contribute to feeling anxious.    Intervention(s)  Psychodynamic  CBT  Internal Family Systems    Objective #B  Patient will use 1 or more coping skills for anxiety management each week.   Status: New - Date: 7/13/2023      Intervention(s)  Therapist will teach emotional regulation skills. (mindfulness, breathing  techniques, progressive muscle relaxation) .  Co-develop short-term goals and review progress in session.   CBT  Internal Family Systems  Mindfulness      Goal 3: Improve interpersonal relationships.       Objective #A (Patient Action)    Status: New - Date: 7/13/2023      Patient will compile a list of boundaries that they would like to set with others.    Intervention(s)  Client Centered  Motivational Interviewing  Psycho-education  Psychodynamic  Internal Family Systems.     Objective #B  Patient will practice setting boundaries 1 or more times each week.    Status: New - Date: 7/13/2023      Intervention(s)  Client Centered  Motivational Interviewing  Co-develop short-term goals and review progress in session.         Patient has reviewed and agreed to the above plan.      Aidee Conti, [facility]  July 13, 2023

## 2023-10-16 ENCOUNTER — OFFICE VISIT (OUTPATIENT)
Dept: URGENT CARE | Facility: URGENT CARE | Age: 53
End: 2023-10-16
Payer: COMMERCIAL

## 2023-10-16 VITALS
WEIGHT: 205 LBS | SYSTOLIC BLOOD PRESSURE: 108 MMHG | OXYGEN SATURATION: 96 % | HEART RATE: 79 BPM | DIASTOLIC BLOOD PRESSURE: 71 MMHG | BODY MASS INDEX: 33.09 KG/M2

## 2023-10-16 DIAGNOSIS — B37.31 CANDIDAL VAGINITIS: ICD-10-CM

## 2023-10-16 DIAGNOSIS — B96.89 BV (BACTERIAL VAGINOSIS): ICD-10-CM

## 2023-10-16 DIAGNOSIS — R19.7 DIARRHEA, UNSPECIFIED TYPE: ICD-10-CM

## 2023-10-16 DIAGNOSIS — N76.0 BV (BACTERIAL VAGINOSIS): ICD-10-CM

## 2023-10-16 DIAGNOSIS — N89.8 VAGINAL ITCHING: Primary | ICD-10-CM

## 2023-10-16 LAB
ALBUMIN UR-MCNC: NEGATIVE MG/DL
APPEARANCE UR: CLEAR
BILIRUB UR QL STRIP: NEGATIVE
CLUE CELLS: PRESENT
COLOR UR AUTO: YELLOW
GLUCOSE UR STRIP-MCNC: NEGATIVE MG/DL
HGB UR QL STRIP: NEGATIVE
KETONES UR STRIP-MCNC: NEGATIVE MG/DL
LEUKOCYTE ESTERASE UR QL STRIP: NEGATIVE
NITRATE UR QL: NEGATIVE
PH UR STRIP: 6 [PH] (ref 5–7)
SP GR UR STRIP: 1.02 (ref 1–1.03)
TRICHOMONAS, WET PREP: ABNORMAL
UROBILINOGEN UR STRIP-ACNC: 0.2 E.U./DL
WBC'S/HIGH POWER FIELD, WET PREP: ABNORMAL
YEAST, WET PREP: PRESENT

## 2023-10-16 PROCEDURE — 99214 OFFICE O/P EST MOD 30 MIN: CPT | Performed by: PHYSICIAN ASSISTANT

## 2023-10-16 PROCEDURE — 87209 SMEAR COMPLEX STAIN: CPT | Performed by: PHYSICIAN ASSISTANT

## 2023-10-16 PROCEDURE — 87491 CHLMYD TRACH DNA AMP PROBE: CPT | Performed by: PHYSICIAN ASSISTANT

## 2023-10-16 PROCEDURE — 87177 OVA AND PARASITES SMEARS: CPT | Performed by: PHYSICIAN ASSISTANT

## 2023-10-16 PROCEDURE — 87591 N.GONORRHOEAE DNA AMP PROB: CPT | Performed by: PHYSICIAN ASSISTANT

## 2023-10-16 PROCEDURE — 81003 URINALYSIS AUTO W/O SCOPE: CPT | Performed by: PHYSICIAN ASSISTANT

## 2023-10-16 PROCEDURE — 87210 SMEAR WET MOUNT SALINE/INK: CPT | Performed by: PHYSICIAN ASSISTANT

## 2023-10-16 RX ORDER — FLUCONAZOLE 150 MG/1
150 TABLET ORAL ONCE
Qty: 1 TABLET | Refills: 1 | Status: SHIPPED | OUTPATIENT
Start: 2023-10-16 | End: 2023-10-16

## 2023-10-16 RX ORDER — DIPHENOXYLATE HCL/ATROPINE 2.5-.025MG
1 TABLET ORAL 3 TIMES DAILY PRN
Qty: 15 TABLET | Refills: 0 | Status: SHIPPED | OUTPATIENT
Start: 2023-10-16 | End: 2024-02-05

## 2023-10-16 RX ORDER — METRONIDAZOLE 7.5 MG/G
1 GEL VAGINAL DAILY
Qty: 70 G | Refills: 0 | Status: SHIPPED | OUTPATIENT
Start: 2023-10-16 | End: 2023-10-21

## 2023-10-16 NOTE — PROGRESS NOTES
Assessment & Plan     Vaginal itching    Wet prep POS for yeast   A vaginal yeast infection is the growth of too many yeast cells in the vagina. This is a common problem. Itching, vaginal discharge and irritation, and other symptoms can bother you. But yeast infections don't often cause other health problems.  Some medicines can increase your risk of getting a yeast infection. These include antibiotics, hormones, and steroids. You may also be more likely to get a yeast infection if you are pregnant, have diabetes, douche, or wear tight clothes.    - UA Macroscopic with reflex to Microscopic and Culture - Clinic Collect  - Wet prep - Clinic Collect    Diarrhea, unspecified type    Diarrhea is loose, watery stools (bowel movements). The exact cause is often hard to find. Sometimes diarrhea is your body's way of getting rid of what caused an upset stomach. Viruses, food poisoning, and many medicines can cause diarrhea. Some people get diarrhea in response to emotional stress, anxiety, or certain foods.  Almost everyone has diarrhea now and then. It usually isn't serious, and your stools will return to normal soon. The important thing to do is replace the fluids you have lost, so you can prevent dehydration.    Collect stool cultures and return to clinic  - Enteric Bacteria and Virus Panel by SHANTANU Stool; Future  - Ova and Parasite Exam Routine; Future  - diphenoxylate-atropine (LOMOTIL) 2.5-0.025 MG tablet; Take 1 tablet by mouth 3 times daily as needed for diarrhea    BV (bacterial vaginosis)    You have a vaginal infection called bacterial vaginosis (BV). BV occurs when the good bacteria are outnumbered by the bad bacteria causes an imbalance in the vagina. BV is linked with sexual activity, but it's not a sexually transmitted infection (STI).   BV may or may not cause symptoms of thin, gray, milky-white, or sometimes green discharge, unpleasant odor or  fishy  smell.  BV is most often treated with medicines called  antibiotics. These may be given as pills or as a vaginal cream.  Do not drink alcohol while on Flagyl or Metrogel as this can cause severe nausea and vomiting.    It's not known what causes BV, but certain factors can make the problem more likely. These can include:   Douching  Spermicides  Use of antibiotics  Change in hormone levels with pregnancy, breastfeeding, or menopause  Having sex with a new partner  Having sex with more than one partner      - metroNIDAZOLE (METROGEL) 0.75 % vaginal gel; Place 1 applicator (5 g) vaginally daily for 5 days    Candidal vaginitis    - fluconazole (DIFLUCAN) 150 MG tablet; Take 1 tablet (150 mg) by mouth once for 1 dose     Nicotine/Tobacco Cessation:  She reports that she has been smoking cigarettes. She has a 2.75 pack-year smoking history. She has never used smokeless tobacco.  Nicotine/Tobacco Cessation Plan:       At today's visit with Dalia Hart , we discussed results, diagnosis, medications and formulated a plan.  We also discussed red flags for immediate return to clinic/ER, as well as indications for follow up with PCP if not improved in 3 days. Patient understood and agreed to plan. Dalia Hart was discharged with stable vitals and has no further questions.       No follow-ups on file.    Sav Christian, [facility], PA-C  [facility]    Tierney Gómez is a 53 year old, presenting for the following health issues:  Diarrhea (For a week ) and Vaginal Problem (Itching )      HPI   Review of Systems   Constitutional, HEENT, cardiovascular, pulmonary, gi and gu systems are negative, except as otherwise noted.      Objective    /71   Pulse 79   Wt 93 kg (205 lb)   SpO2 96%   BMI 33.09 kg/m    Body mass index is 33.09 kg/m .  Physical Exam   GENERAL: healthy, alert and no distress  ABDOMEN: soft, nontender, no hepatosplenomegaly, no masses and bowel sounds normal  MS: no gross musculoskeletal defects noted, no edema  SKIN: no  suspicious lesions or rashes  NEURO: Normal strength and tone, mentation intact and speech normal  PSYCH: mentation appears normal, affect normal/bright        Results for orders placed or performed in visit on 10/16/23   UA Macroscopic with reflex to Microscopic and Culture - Clinic Collect     Status: Normal    Specimen: Urine, Clean Catch   Result Value Ref Range    Color Urine Yellow Colorless, Straw, Light Yellow, Yellow    Appearance Urine Clear Clear    Glucose Urine Negative Negative mg/dL    Bilirubin Urine Negative Negative    Ketones Urine Negative Negative mg/dL    Specific Gravity Urine 1.025 1.003 - 1.035    Blood Urine Negative Negative    pH Urine 6.0 5.0 - 7.0    Protein Albumin Urine Negative Negative mg/dL    Urobilinogen Urine 0.2 0.2, 1.0 E.U./dL    Nitrite Urine Negative Negative    Leukocyte Esterase Urine Negative Negative    Narrative    Microscopic not indicated   Wet prep - Clinic Collect     Status: Abnormal    Specimen: Vagina; Swab   Result Value Ref Range    Trichomonas Absent Absent    Yeast Present (A) Absent    Clue Cells Present (A) Absent    WBCs/high power field 2+ (A) None

## 2023-10-17 LAB
ADV 40+41 DNA STL QL NAA+NON-PROBE: NEGATIVE
ASTRO TYP 1-8 RNA STL QL NAA+NON-PROBE: NEGATIVE
C CAYETANENSIS DNA STL QL NAA+NON-PROBE: NEGATIVE
C TRACH DNA SPEC QL NAA+PROBE: NEGATIVE
CAMPYLOBACTER DNA SPEC NAA+PROBE: NEGATIVE
CRYPTOSP DNA STL QL NAA+NON-PROBE: NEGATIVE
E COLI O157 DNA STL QL NAA+NON-PROBE: NORMAL
E HISTOLYT DNA STL QL NAA+NON-PROBE: NEGATIVE
EAEC ASTA GENE ISLT QL NAA+PROBE: NEGATIVE
EC STX1+STX2 GENES STL QL NAA+NON-PROBE: NEGATIVE
EPEC EAE GENE STL QL NAA+NON-PROBE: NEGATIVE
ETEC LTA+ST1A+ST1B TOX ST NAA+NON-PROBE: NEGATIVE
G LAMBLIA DNA STL QL NAA+NON-PROBE: NEGATIVE
N GONORRHOEA DNA SPEC QL NAA+PROBE: NEGATIVE
NOROVIRUS GI+II RNA STL QL NAA+NON-PROBE: NEGATIVE
P SHIGELLOIDES DNA STL QL NAA+NON-PROBE: NEGATIVE
RVA RNA STL QL NAA+NON-PROBE: NEGATIVE
SALMONELLA SP RPOD STL QL NAA+PROBE: NEGATIVE
SAPO I+II+IV+V RNA STL QL NAA+NON-PROBE: NEGATIVE
SHIGELLA SP+EIEC IPAH ST NAA+NON-PROBE: NEGATIVE
V CHOLERAE DNA SPEC QL NAA+PROBE: NEGATIVE
VIBRIO DNA SPEC NAA+PROBE: NEGATIVE
Y ENTEROCOL DNA STL QL NAA+PROBE: NEGATIVE

## 2023-10-18 ENCOUNTER — NURSE TRIAGE (OUTPATIENT)
Dept: INTERNAL MEDICINE | Facility: CLINIC | Age: 53
End: 2023-10-18

## 2023-10-18 ENCOUNTER — OFFICE VISIT (OUTPATIENT)
Dept: PEDIATRICS | Facility: CLINIC | Age: 53
End: 2023-10-18
Payer: COMMERCIAL

## 2023-10-18 VITALS
BODY MASS INDEX: 33.57 KG/M2 | OXYGEN SATURATION: 96 % | SYSTOLIC BLOOD PRESSURE: 118 MMHG | TEMPERATURE: 97 F | DIASTOLIC BLOOD PRESSURE: 68 MMHG | WEIGHT: 208 LBS | HEART RATE: 84 BPM

## 2023-10-18 DIAGNOSIS — R19.7 DIARRHEA, UNSPECIFIED TYPE: Primary | ICD-10-CM

## 2023-10-18 LAB
ALBUMIN SERPL BCG-MCNC: 3.8 G/DL (ref 3.5–5.2)
ALP SERPL-CCNC: 86 U/L (ref 35–104)
ALT SERPL W P-5'-P-CCNC: 18 U/L (ref 0–50)
ANION GAP SERPL CALCULATED.3IONS-SCNC: 9 MMOL/L (ref 7–15)
AST SERPL W P-5'-P-CCNC: 20 U/L (ref 0–45)
BILIRUB SERPL-MCNC: 0.2 MG/DL
BUN SERPL-MCNC: 19.8 MG/DL (ref 6–20)
CALCIUM SERPL-MCNC: 9.2 MG/DL (ref 8.6–10)
CHLORIDE SERPL-SCNC: 106 MMOL/L (ref 98–107)
CREAT SERPL-MCNC: 0.88 MG/DL (ref 0.51–0.95)
DEPRECATED HCO3 PLAS-SCNC: 26 MMOL/L (ref 22–29)
EGFRCR SERPLBLD CKD-EPI 2021: 77 ML/MIN/1.73M2
ERYTHROCYTE [DISTWIDTH] IN BLOOD BY AUTOMATED COUNT: 11.8 % (ref 10–15)
GLUCOSE SERPL-MCNC: 95 MG/DL (ref 70–99)
HCT VFR BLD AUTO: 43.5 % (ref 35–47)
HGB BLD-MCNC: 14.3 G/DL (ref 11.7–15.7)
LIPASE SERPL-CCNC: 37 U/L (ref 13–60)
MCH RBC QN AUTO: 30 PG (ref 26.5–33)
MCHC RBC AUTO-ENTMCNC: 32.9 G/DL (ref 31.5–36.5)
MCV RBC AUTO: 91 FL (ref 78–100)
O+P STL MICRO: NEGATIVE
PLATELET # BLD AUTO: 323 10E3/UL (ref 150–450)
POTASSIUM SERPL-SCNC: 3.9 MMOL/L (ref 3.4–5.3)
PROT SERPL-MCNC: 6.5 G/DL (ref 6.4–8.3)
RBC # BLD AUTO: 4.77 10E6/UL (ref 3.8–5.2)
SODIUM SERPL-SCNC: 141 MMOL/L (ref 135–145)
WBC # BLD AUTO: 6.7 10E3/UL (ref 4–11)

## 2023-10-18 PROCEDURE — 36415 COLL VENOUS BLD VENIPUNCTURE: CPT | Performed by: INTERNAL MEDICINE

## 2023-10-18 PROCEDURE — 96374 THER/PROPH/DIAG INJ IV PUSH: CPT | Mod: 59 | Performed by: INTERNAL MEDICINE

## 2023-10-18 PROCEDURE — 80053 COMPREHEN METABOLIC PANEL: CPT | Performed by: INTERNAL MEDICINE

## 2023-10-18 PROCEDURE — 99214 OFFICE O/P EST MOD 30 MIN: CPT | Mod: 25 | Performed by: INTERNAL MEDICINE

## 2023-10-18 PROCEDURE — 85027 COMPLETE CBC AUTOMATED: CPT | Performed by: INTERNAL MEDICINE

## 2023-10-18 PROCEDURE — 83690 ASSAY OF LIPASE: CPT | Performed by: INTERNAL MEDICINE

## 2023-10-18 PROCEDURE — 96360 HYDRATION IV INFUSION INIT: CPT | Performed by: INTERNAL MEDICINE

## 2023-10-18 RX ORDER — ONDANSETRON 2 MG/ML
4 INJECTION INTRAMUSCULAR; INTRAVENOUS
Status: ACTIVE | OUTPATIENT
Start: 2023-10-18 | End: 2023-10-19

## 2023-10-18 RX ORDER — DEXTROSE, SODIUM CHLORIDE, AND POTASSIUM CHLORIDE 5; .45; .15 G/100ML; G/100ML; G/100ML
1000 INJECTION INTRAVENOUS ONCE
Status: COMPLETED | OUTPATIENT
Start: 2023-10-18 | End: 2023-10-18

## 2023-10-18 RX ADMIN — ONDANSETRON 4 MG: 2 INJECTION INTRAMUSCULAR; INTRAVENOUS at 15:50

## 2023-10-18 RX ADMIN — DEXTROSE, SODIUM CHLORIDE, AND POTASSIUM CHLORIDE 1000 ML: 5; .45; .15 INJECTION INTRAVENOUS at 15:50

## 2023-10-18 NOTE — PROGRESS NOTES
"  Assessment & Plan     Patient with several days of, cramping and loose stools that occurs only with p.o. intake.  She was seen recently at urgent care and had negative stool studies.    Serum studies today are reassuring.  The patient noted improvement with IV fluids.    We discussed brat diet and avoidance of dairy.  Signs and symptoms of worsening and ER precautions are discussed    Diarrhea, unspecified type    - dextrose 5% and 0.45% NaCl + KCl 20 mEq/L BOLUS 1,000 mL  - Comprehensive metabolic panel  - CBC with platelets  - Lipase  - sodium chloride (PF) 0.9% PF flush 3 mL  - ondansetron (ZOFRAN) injection 4 mg  - Comprehensive metabolic panel  - CBC with platelets  - Lipase        No follow-ups on file.    Warner Babin MD  [facility] SPECIALTY [facility] STEPHANIE Gómez is a 53 year old, presenting for the following health issues:  Diarrhea    Abdominal cramping and diarrhea for over a week.  She does recall a restaurant outing that proceeded her symptoms.  Loose stools occur only with PO intake.    Took an Imodium over a week ago.  Did help.      Cramping occurs only when she tries to eat or drink something.  Same with the diarrhea.    As a result of the last few days she has hardly eaten or drink much at all.  She did take 3 glasses of water this morning.  Her last loose stool was about an hour ago.    She did go to urgent care 2 days ago.  Had stool studies which were unremarkable.    HPI     Diarrhea  Onset/Duration: 10 days ago  Description:       Consistency of stool: watery, runny, explosive, and mucousy. Pt does not know color       Blood in stool: unknown, pt states she doesn't want to look at stool       Number of loose stools past 24 hours: 4-6  Progression of Symptoms: worsening  Accompanying signs and symptoms:       Fever: No       Nausea/Vomiting: YES- nausea, no emesis       Abdominal pain: YES- \"all over\" abdomen, cramp       Weight loss: No       Episodes of constipation: " No  History   Ill contacts: YES- father's girlfriend had covid 1 month ago, pt in close contact  Recent use of antibiotics: No  Recent travels: No  Recent medication-new or changes(Rx or OTC): No  Precipitating or alleviating factors: None  Therapies tried and outcome: Imodium, helped some        Review of Systems         Objective    /68 (BP Location: Right arm, Patient Position: Sitting, Cuff Size: Adult Regular)   Pulse 84   Temp 97  F (36.1  C) (Oral)   Wt 94.3 kg (208 lb)   SpO2 96%   BMI 33.57 kg/m    Body mass index is 33.57 kg/m .  Physical Exam   GEN NAD  CV RRR  ABD soft, NT no rebound or guarding.  +hyperactive BS

## 2023-10-18 NOTE — TELEPHONE ENCOUNTER
"Triage RN received a call from the patient stating she was seen in  on 10/16/23 for diarrhea and vaginal itching. Patient states the vaginal itching had improved but she continues to have diarrhea.     Patient states the diarrhea has been occurring the past 10 days. Patient states every time she eats something she has an episode of diarrhea. Patient states \"she feels like she is rotting from the inside.\" Patient was given a script for Lomotil but patient expressed some caution on taking the medication because one of the side effects associated with the medication is dizziness. Patient states she already does have some dizziness and weakness present. Patient did take her first dose of Imodium today but has not noticed relief yet. Patient does note she is drinking fluids but did states \"if I had an IV I would put it in my arm right now to give myself fluids.\" Patient did note that she does have a history of passing out but has not passed out since the diarrhea has started. No fever. No nausea/vomiting.     Triage RN recommended Pedialyte along with water. Triage RN called and spoke to Mirela at [facility] to see if patient would be a candidate for [facility] for possible further evaluation and IV fluids. Lab tests completed on 10/16/23 so far have come back negative.     Triage RN called and spoke to Dr. Babin. Patient can be seen in [facility] today for IV fluids. Dr. Babin originally said 1:30 this afternoon. While speaking with patient, patient asked if she could come in around 3:00. After teams messaging with Mirela at the [facility], 3:00 PM was decided for the arrival time for the patient. Patient expressed understanding with the plan and had no additional questions at this time.     Reason for Disposition   MODERATE diarrhea (e.g., 4-6 times / day more than normal) and present > 48 hours (2 days)    Additional Information   Negative: Shock suspected (e.g., cold/pale/clammy skin, too weak to stand, low BP, rapid pulse)   Negative: " Difficult to awaken or acting confused (e.g., disoriented, slurred speech)   Negative: Sounds like a life-threatening emergency to the triager   Negative: Vomiting also present and worse than the diarrhea   Negative: Blood in stool and without diarrhea   Negative: SEVERE abdominal pain (e.g., excruciating) and present > 1 hour   Negative: SEVERE abdominal pain and age > 60 years   Negative: Bloody, black, or tarry bowel movements  (Exception: Chronic-unchanged black-grey bowel movements and is taking iron pills or Pepto-Bismol.)   Negative: SEVERE diarrhea (e.g., 7 or more times / day more than normal) and age > 60 years   Negative: Constant abdominal pain lasting > 2 hours   Negative: Drinking very little and dehydration suspected (e.g., no urine > 12 hours, very dry mouth, very lightheaded)   Negative: Patient sounds very sick or weak to the triager   Negative: SEVERE diarrhea (e.g., 7 or more times / day more than normal) and present > 24 hours (1 day)    Protocols used: Diarrhea-A-OH    Kristina Macias RN

## 2023-10-19 ENCOUNTER — VIRTUAL VISIT (OUTPATIENT)
Dept: PSYCHOLOGY | Facility: CLINIC | Age: 53
End: 2023-10-19
Payer: COMMERCIAL

## 2023-10-19 DIAGNOSIS — F33.1 MAJOR DEPRESSIVE DISORDER, RECURRENT EPISODE, MODERATE (H): ICD-10-CM

## 2023-10-19 DIAGNOSIS — F43.22 ADJUSTMENT DISORDER WITH ANXIOUS MOOD: Primary | ICD-10-CM

## 2023-10-19 PROCEDURE — 90834 PSYTX W PT 45 MINUTES: CPT | Mod: VID | Performed by: SOCIAL WORKER

## 2023-10-19 ASSESSMENT — COLUMBIA-SUICIDE SEVERITY RATING SCALE - C-SSRS
1. SINCE LAST CONTACT, HAVE YOU WISHED YOU WERE DEAD OR WISHED YOU COULD GO TO SLEEP AND NOT WAKE UP?: NO
SUICIDE, SINCE LAST CONTACT: NO
TOTAL  NUMBER OF ABORTED OR SELF INTERRUPTED ATTEMPTS SINCE LAST CONTACT: NO
TOTAL  NUMBER OF INTERRUPTED ATTEMPTS SINCE LAST CONTACT: NO
2. HAVE YOU ACTUALLY HAD ANY THOUGHTS OF KILLING YOURSELF?: NO
ATTEMPT SINCE LAST CONTACT: NO
6. HAVE YOU EVER DONE ANYTHING, STARTED TO DO ANYTHING, OR PREPARED TO DO ANYTHING TO END YOUR LIFE?: NO

## 2023-10-19 NOTE — PROGRESS NOTES
"[facility]                                     Progress Note    Patient Name: Dalia Hart  Date: 2023       Service Type: Individual      Session Start Time: 1:09 PM  Session End Time: 1:59 PM     Session Length:   50 min     Session #: 15    Attendees: Client attended alone    Service Modality: Phone Visit:      Provider verified identity through the following two step process.  Patient provided:  Patient  and Patient is known previously to provider     Telemedicine Visit: The patient's condition can be safely assessed and treated via synchronous audio and visual telemedicine encounter.      Reason for Telemedicine Visit: Patient convenience (e.g. access to timely appointments / distance to available provider)    Originating Site (Patient Location): Patient's home    Distant Site (Provider Location): Provider Remote Setting- Home Office    Consent:  The patient/guardian has verbally consented to: the potential risks and benefits of telemedicine (video visit) versus in person care; bill my insurance or make self-payment for services provided; and responsibility for payment of non-covered services.   Distant Location (Provider):  Off-site    As the provider I attest to compliance with applicable laws and regulations related to telemedicine.    DATA  Interactive Complexity: No  Crisis: No        Progress Since Last Session (Related to Symptoms / Goals / Homework):   Symptoms: No change in symptoms reported.       Homework: Partially completed      Episode of Care Goals: Satisfactory progress - ACTION (Actively working towards change); Intervened by reinforcing change plan / affirming steps taken     Current / Ongoing Stressors and Concerns:   The patient continues to presents with depression and adjustment disorder symptoms. The patient states: \"I'm really sick.\" The patient reports that she has been sick for the past 10 days. The patient identified the following " stressors/concerns: recent break up, financial stress, and work related stress. Patient processed through her thoughts and emotions related to: her experience being sick, her job, her recent interpersonal interactions, her interpersonal relationships, dating and her relationship history.  Patient and therapist continue to discussed the benefits of: maintaining healthy interpersonal boundaries, the benefits of positive affirmations, journaling, and focusing on what she wants.      Treatment Objective(s) Addressed in This Session:   Continued to address:   Patient will identify triggers/ fears / thoughts that contribute to feeling anxious   Patient will use 1 or more coping skills for anxiety management each week.    Patient will practice setting boundaries 1 or more times each week.      Intervention:   Client Centered   Psychodynamic   Psycho-education    Integrative Psychotherapy   Validation and Normalization   Co-develop short-term goals and review progress in session.    Assessments completed prior to visit: None  The following assessments were completed by patient for this visit: [place] Since Last Contact     ASSESSMENT: Current Emotional / Mental Status (status of significant symptoms):   Risk status (Self / Other harm or suicidal ideation)   Patient denies current fears or concerns for personal safety.   Patient denies current or recent suicidal ideation or behaviors.   Patient denies current or recent homicidal ideation or behaviors.   Patient denies current or recent self injurious behavior or ideation.   Patient denies other safety concerns.   Patient reports there has been no change in risk factors since their last session.     Patient reports there has been no change in protective factors since their last session.     Recommended that patient call 911 or go to the local ED should there be a change in any of these risk factors.     Appearance:   Appropriate    Eye Contact:   Good    Psychomotor  Behavior: Normal    Attitude:   Cooperative  Interested Friendly Pleasant Attentive   Orientation:   Person Place Time Situation All   Speech    Rate / Production: Normal/ Responsive    Volume:  Normal    Mood:    Anxious  Normal   Affect:    Appropriate    Thought Content:  Clear    Thought Form:  Coherent  Goal Directed  Logical    Insight:    Good      Medication Review:   No current psychiatric medications prescribed     Medication Compliance:   NA     Changes in Health Issues:   None reported     Chemical Use Review:   Substance Use: Chemical use reviewed, no active concerns identified      Tobacco Use: Not addressed in visit.     Diagnosis:  1. Adjustment disorder with anxious mood    2. Major depressive disorder, recurrent episode, moderate (H)            Collateral Reports Completed:   Not Applicable    PLAN: (Patient Tasks / Therapist Tasks / Other  Patient plans to spend 15 minutes organizing a day.  Patient plans to journal 3/week for 5 minutes or more.  Patient plans to listen to guided healing meditations  Patient plans to focus on getting better.   Patient plans to continue to maintain healthy interpersonal boundaries.   Patient plans to return for follow up: [id number]  Next session Update treatment plan    In a future session: patient plans to make a strict schedule so that she doesn't overwork herself. Pattient plans to come up with a plan to manage her SAD      Aidee CURTIS Conti, [facility]                                                         ______________________________________________________________________    Individual Treatment Plan    Patient's Name: Dalia Hart  YOB: 1970    Date of Creation: 7/13/2023  Date Treatment Plan Last Reviewed/Revised: 10/19/2023    DSM5 Diagnoses:   1. Major depressive disorder, recurrent episode, moderate (H)    2. Adjustment disorder with anxious mood      Psychosocial / Contextual Factors: Patient identified the following  stressors/concerns: financial, patient recently lost her job, and parenting stress. Patent report that she is not getting a lot of sleep and reports that she does not always get regular sleep.   PROMIS (reviewed every 90 days): 21    Referral / Collaboration:  Referral to another professional/service is not indicated at this time..    Anticipated number of session for this episode of care: 9-12 sessions  Anticipation frequency of session: Weekly  Anticipated Duration of each session: 38-52 minutes  Treatment plan will be reviewed in 90 days or when goals have been changed.       MeasurableTreatment Goal(s) related to diagnosis / functional impairment(s)  Goal 1: Decrease severity of depression symptoms.       Objective #A (Patient Action)    Patient will Decrease frequency and intensity of feeling down, depressed, hopeless.  Status: New - Date: 7/13/2023  Continued dates: 10/19/2023    Intervention(s)  Client Centered  CBT  Psycho-dynamic  Internal Family Systems  Psycho-education    Objective #B  Patient will Improve quantity and quality of night time sleep / decrease daytime naps.  Status: New - Date: 7/13/2023  Continued dates: 10/19/2023    Intervention(s)  Client Centered  CBT  Internal Family Systems  Psycho-education  Mindulness    Objective #C  Patient will Identify negative self-talk and behaviors: challenge core beliefs, myths, and actions.  Status: New - Date: 7/13/2023  Continued dates: 10/19/2023    Intervention(s)  Client Centered  CBT  Internal Family Systems  Psycho-education      Goal 2: Decrease severity of anxiety related symptoms.       Objective #A (Patient Action)    Status: New - Date: 7/13/2023  Continued dates: 10/19/2023    Patient will identify triggers/ fears / thoughts that contribute to feeling anxious.    Intervention(s)  Psychodynamic  CBT  Internal Family Systems    Objective #B  Patient will use 1 or more coping skills for anxiety management each week.   Status: New - Date:  7/13/2023  Continued dates: 10/19/2023    Intervention(s)  Therapist will teach emotional regulation skills. (mindfulness, breathing techniques, progressive muscle relaxation) .  Co-develop short-term goals and review progress in session.   CBT  Internal Family Systems  Mindfulness      Goal 3: Improve interpersonal relationships.       Objective #A (Patient Action)    Status: New - Date: 7/13/2023  Continued dates: 10/19/2023    Patient will compile a list of boundaries that they would like to set with others.    Intervention(s)  Client Centered  Motivational Interviewing  Psycho-education  Psychodynamic  Internal Family Systems.     Objective #B  Patient will practice setting boundaries 1 or more times each week.    Status: New - Date: 7/13/2023  Continued dates: 10/19/2023    Intervention(s)  Client Centered  Motivational Interviewing  Co-develop short-term goals and review progress in session.         Patient has reviewed and agreed to the above plan.      Aidee Conti, [facility]  October 19, 2023

## 2023-10-27 ENCOUNTER — VIRTUAL VISIT (OUTPATIENT)
Dept: PSYCHOLOGY | Facility: CLINIC | Age: 53
End: 2023-10-27
Payer: COMMERCIAL

## 2023-10-27 DIAGNOSIS — F43.22 ADJUSTMENT DISORDER WITH ANXIOUS MOOD: Primary | ICD-10-CM

## 2023-10-27 DIAGNOSIS — F33.1 MAJOR DEPRESSIVE DISORDER, RECURRENT EPISODE, MODERATE (H): ICD-10-CM

## 2023-10-27 NOTE — PROGRESS NOTES
Provider was unable to reach patient for video session .     Provider left patient a voicemail and sent patient a [employer] message with instructions to schedule a follow up appointment.

## 2023-11-13 ENCOUNTER — VIRTUAL VISIT (OUTPATIENT)
Dept: PSYCHOLOGY | Facility: CLINIC | Age: 53
End: 2023-11-13
Payer: COMMERCIAL

## 2023-11-13 DIAGNOSIS — F33.1 MAJOR DEPRESSIVE DISORDER, RECURRENT EPISODE, MODERATE (H): ICD-10-CM

## 2023-11-13 DIAGNOSIS — F43.22 ADJUSTMENT DISORDER WITH ANXIOUS MOOD: Primary | ICD-10-CM

## 2023-11-13 PROCEDURE — 90834 PSYTX W PT 45 MINUTES: CPT | Mod: VID | Performed by: SOCIAL WORKER

## 2023-11-13 NOTE — PROGRESS NOTES
"[facility]                                     Progress Note    Patient Name: Dalia Hart  Date: 2023         Service Type: Individual      Session Start Time: 10:43 AM  Session End Time: 11:33 AM     Session Length:   50 min     Session #: 16    Attendees: Client attended alone    Service Modality: Phone Visit:      Provider verified identity through the following two step process.  Patient provided:  Patient  and Patient is known previously to provider     Telemedicine Visit: The patient's condition can be safely assessed and treated via synchronous audio and visual telemedicine encounter.      Reason for Telemedicine Visit: Patient convenience (e.g. access to timely appointments / distance to available provider)    Originating Site (Patient Location): Patient's home    Distant Site (Provider Location): Provider Remote Setting- Home Office    Consent:  The patient/guardian has verbally consented to: the potential risks and benefits of telemedicine (video visit) versus in person care; bill my insurance or make self-payment for services provided; and responsibility for payment of non-covered services.   Distant Location (Provider):  Off-site    As the provider I attest to compliance with applicable laws and regulations related to telemedicine.    DATA  Interactive Complexity: No  Crisis: No        Progress Since Last Session (Related to Symptoms / Goals / Homework):   Symptoms: No change in symptoms reported.       Homework: Partially completed      Episode of Care Goals: Satisfactory progress - ACTION (Actively working towards change); Intervened by reinforcing change plan / affirming steps taken     Current / Ongoing Stressors and Concerns:   The patient continues to presents with depression and adjustment disorder symptoms. The patient states: \"I'm doing ok.\"  The patient identified the following stressors/concerns: work related stress and interpersonal stress. Patient " processed through her thoughts and emotions related to: her job, her recent interactions with her colleagues, her interpersonal relationships and dating. Patient and therapist continue to discussed the benefits of: maintaining healthy interpersonal boundaries,  journaling, and focusing on what she wants.      Treatment Objective(s) Addressed in This Session:   Continued to address:   Patient will identify triggers/ fears / thoughts that contribute to feeling anxious   Patient will use 1 or more coping skills for anxiety management each week.    Patient will practice setting boundaries 1 or more times each week.      Intervention:   Client Centered   Psychodynamic   Psycho-education    Integrative Psychotherapy  Motivational Interviewing   Validation and Normalization   Co-develop short-term goals and review progress in session.    Assessments completed prior to visit: None  The following assessments were completed by patient for this visit: [place] Since Last Contact     ASSESSMENT: Current Emotional / Mental Status (status of significant symptoms):   Risk status (Self / Other harm or suicidal ideation)   Patient denies current fears or concerns for personal safety.   Patient denies current or recent suicidal ideation or behaviors.   Patient denies current or recent homicidal ideation or behaviors.   Patient denies current or recent self injurious behavior or ideation.   Patient denies other safety concerns.   Patient reports there has been no change in risk factors since their last session.     Patient reports there has been no change in protective factors since their last session.     Recommended that patient call 911 or go to the local ED should there be a change in any of these risk factors.     Appearance:   Appropriate    Eye Contact:   Good    Psychomotor Behavior: Normal    Attitude:   Cooperative  Interested Friendly Pleasant Attentive   Orientation:   Person Place Time Situation All   Speech    Rate /  Production: Normal/ Responsive    Volume:  Normal    Mood:    Anxious  Normal   Affect:    Appropriate    Thought Content:  Clear    Thought Form:  Coherent  Goal Directed  Logical    Insight:    Good      Medication Review:   No current psychiatric medications prescribed     Medication Compliance:   NA     Changes in Health Issues:   None reported     Chemical Use Review:   Substance Use: Chemical use reviewed, no active concerns identified      Tobacco Use: Not addressed in visit.     Diagnosis:  1. Adjustment disorder with anxious mood    2. Major depressive disorder, recurrent episode, moderate (H)    R/O: ADHD    Collateral Reports Completed:   Not Applicable    PLAN: (Patient Tasks / Therapist Tasks / Other  Patient plans to spend 15 minutes organizing a day - Keep setting a 15 minute timer for 15 minutes  Patient plans to right down 1-3 things she is grateful for each morning.  Patient also plans to create a budget.   Patient plans to journal 3/week for 5 minutes or more.  Patient plans to continue to maintain healthy interpersonal boundaries.   Patient plans to return for follow up: 12/01/2023    In a future session: patient plans to make a strict schedule so that she doesn't overwork herself. Pattient plans to come up with a plan to manage her SAD      Aidee CURTIS Conti, [facility]                                                         ______________________________________________________________________    Individual Treatment Plan    Patient's Name: Dalia Hart  YOB: 1970    Date of Creation: 7/13/2023  Date Treatment Plan Last Reviewed/Revised: 10/19/2023    DSM5 Diagnoses:   1. Major depressive disorder, recurrent episode, moderate (H)    2. Adjustment disorder with anxious mood      Psychosocial / Contextual Factors: Patient identified the following stressors/concerns: financial, patient recently lost her job, and parenting stress. Patent report that she is not getting a lot of sleep  and reports that she does not always get regular sleep.   PROMIS (reviewed every 90 days): 21    Referral / Collaboration:  Referral to another professional/service is not indicated at this time..    Anticipated number of session for this episode of care: 9-12 sessions  Anticipation frequency of session: Weekly  Anticipated Duration of each session: 38-52 minutes  Treatment plan will be reviewed in 90 days or when goals have been changed.       MeasurableTreatment Goal(s) related to diagnosis / functional impairment(s)  Goal 1: Decrease severity of depression symptoms.       Objective #A (Patient Action)    Patient will Decrease frequency and intensity of feeling down, depressed, hopeless.  Status: New - Date: 7/13/2023  Continued dates: 10/19/2023    Intervention(s)  Client Centered  CBT  Psycho-dynamic  Internal Family Systems  Psycho-education    Objective #B  Patient will Improve quantity and quality of night time sleep / decrease daytime naps.  Status: New - Date: 7/13/2023  Continued dates: 10/19/2023    Intervention(s)  Client Centered  CBT  Internal Family Systems  Psycho-education  Mindulness    Objective #C  Patient will Identify negative self-talk and behaviors: challenge core beliefs, myths, and actions.  Status: New - Date: 7/13/2023  Continued dates: 10/19/2023    Intervention(s)  Client Centered  CBT  Internal Family Systems  Psycho-education      Goal 2: Decrease severity of anxiety related symptoms.       Objective #A (Patient Action)    Status: New - Date: 7/13/2023  Continued dates: 10/19/2023    Patient will identify triggers/ fears / thoughts that contribute to feeling anxious.    Intervention(s)  Psychodynamic  CBT  Internal Family Systems    Objective #B  Patient will use 1 or more coping skills for anxiety management each week.   Status: New - Date: 7/13/2023  Continued dates: 10/19/2023    Intervention(s)  Therapist will teach emotional regulation skills. (mindfulness, breathing techniques,  progressive muscle relaxation) .  Co-develop short-term goals and review progress in session.   CBT  Internal Family Systems  Mindfulness      Goal 3: Improve interpersonal relationships.       Objective #A (Patient Action)    Status: New - Date: 7/13/2023  Continued dates: 10/19/2023    Patient will compile a list of boundaries that they would like to set with others.    Intervention(s)  Client Centered  Motivational Interviewing  Psycho-education  Psychodynamic  Internal Family Systems.     Objective #B  Patient will practice setting boundaries 1 or more times each week.    Status: New - Date: 7/13/2023  Continued dates: 10/19/2023    Intervention(s)  Client Centered  Motivational Interviewing  Co-develop short-term goals and review progress in session.         Patient has reviewed and agreed to the above plan.      Aidee Conti, [facility]  October 19, 2023

## 2023-12-01 ENCOUNTER — VIRTUAL VISIT (OUTPATIENT)
Dept: PSYCHOLOGY | Facility: CLINIC | Age: 53
End: 2023-12-01
Payer: COMMERCIAL

## 2023-12-01 DIAGNOSIS — F43.22 ADJUSTMENT DISORDER WITH ANXIOUS MOOD: Primary | ICD-10-CM

## 2023-12-01 DIAGNOSIS — F33.1 MAJOR DEPRESSIVE DISORDER, RECURRENT EPISODE, MODERATE (H): ICD-10-CM

## 2023-12-01 PROCEDURE — 90837 PSYTX W PT 60 MINUTES: CPT | Mod: VID | Performed by: SOCIAL WORKER

## 2023-12-01 NOTE — PROGRESS NOTES
"[facility]                                     Progress Note    Patient Name: Dalia Hart  Date: 2023       Service Type: Individual      Session Start Time: 2:06 PM  Session End Time: 2:59 PM     Session Length:    53 min     Session #: 17    Attendees: Client attended alone    Service Modality: Phone Visit:      Provider verified identity through the following two step process.  Patient provided:  Patient  and Patient is known previously to provider     Telemedicine Visit: The patient's condition can be safely assessed and treated via synchronous audio and visual telemedicine encounter.      Reason for Telemedicine Visit: Patient convenience (e.g. access to timely appointments / distance to available provider)    Originating Site (Patient Location): Patient's home    Distant Site (Provider Location): Provider Remote Setting- Home Office    Consent:  The patient/guardian has verbally consented to: the potential risks and benefits of telemedicine (video visit) versus in person care; bill my insurance or make self-payment for services provided; and responsibility for payment of non-covered services.   Distant Location (Provider):  Off-site    As the provider I attest to compliance with applicable laws and regulations related to telemedicine.    DATA  Interactive Complexity: No  Crisis: No        Progress Since Last Session (Related to Symptoms / Goals / Homework):   Symptoms: No change in symptoms reported.       Homework: Partially completed      Episode of Care Goals: Satisfactory progress - ACTION (Actively working towards change); Intervened by reinforcing change plan / affirming steps taken     Current / Ongoing Stressors and Concerns:   The patient continues to present with depression and adjustment disorder symptoms. The patient states: \"I've been busy.\" The patient reports that she has been busy trying to get stuff done before the New Year. The patient identified the " following stressors and concerns: her job and interpersonal relationships. Patient processed through her thoughts and emotions related to: her interpersonal relationships, dating, her job, and her upcoming trip. Patient and therapist continue to discussed the benefits of: breaking tasks into smaller pieces, maintaining healthy interpersonal boundaries,  journaling, and focusing on what she wants.      Treatment Objective(s) Addressed in This Session:   Continued to address:   Patient will identify triggers/ fears / thoughts that contribute to feeling anxious   Patient will use 1 or more coping skills for anxiety management each week.    Patient will practice setting boundaries 1 or more times each week.      Intervention:   Client Centered   Psychodynamic   Psycho-education    Integrative Psychotherapy  Motivational Interviewing   Validation and Normalization   Co-develop short-term goals and review progress in session.    Assessments completed prior to visit: None  The following assessments were completed by patient for this visit: None     ASSESSMENT: Current Emotional / Mental Status (status of significant symptoms):   Risk status (Self / Other harm or suicidal ideation)   Patient denies current fears or concerns for personal safety.   Patient denies current or recent suicidal ideation or behaviors.   Patient denies current or recent homicidal ideation or behaviors.   Patient denies current or recent self injurious behavior or ideation.   Patient denies other safety concerns.   Patient reports there has been no change in risk factors since their last session.     Patient reports there has been no change in protective factors since their last session.     Recommended that patient call 911 or go to the local ED should there be a change in any of these risk factors.     Appearance:   Appropriate    Eye Contact:   Good    Psychomotor Behavior: Normal    Attitude:   Cooperative  Interested Friendly Pleasant  Attentive   Orientation:   Person Place Time Situation All   Speech    Rate / Production: Normal/ Responsive    Volume:  Normal    Mood:    Normal   Affect:    Appropriate    Thought Content:  Clear    Thought Form:  Coherent  Goal Directed  Logical    Insight:    Good      Medication Review:   No current psychiatric medications prescribed     Medication Compliance:   NA     Changes in Health Issues:   None reported     Chemical Use Review:   Substance Use: Chemical use reviewed, no active concerns identified      Tobacco Use: Not addressed in visit.     Diagnosis:  1. Adjustment disorder with anxious mood    2. Major depressive disorder, recurrent episode, moderate (H)      R/O: ADHD    Collateral Reports Completed:   Not Applicable    PLAN: (Patient Tasks / Therapist Tasks / Other  Patient plans to set 15 minute challenges for herself to help herself complete non-preferred tasks.   Patient plans to right down 1-3 things she is grateful for each morning.  Patient also plans to create a budget.   Patient plans to continue to maintain healthy interpersonal boundaries.   Patient plans to return for follow up: 12/12/2023    In a future session: patient plans to make a strict schedule so that she doesn't overwork herself. Pattient plans to come up with a plan to manage her SAD      Aidee CURTIS Conti, [facility]                                                         ______________________________________________________________________    Individual Treatment Plan    Patient's Name: Dalia Hart  YOB: 1970    Date of Creation: 7/13/2023  Date Treatment Plan Last Reviewed/Revised: 10/19/2023    DSM5 Diagnoses:   1. Major depressive disorder, recurrent episode, moderate (H)    2. Adjustment disorder with anxious mood      Psychosocial / Contextual Factors: Patient identified the following stressors/concerns: financial, patient recently lost her job, and parenting stress. Patent report that she is not  getting a lot of sleep and reports that she does not always get regular sleep.   PROMIS (reviewed every 90 days): 21    Referral / Collaboration:  Referral to another professional/service is not indicated at this time..    Anticipated number of session for this episode of care: 9-12 sessions  Anticipation frequency of session: Weekly  Anticipated Duration of each session: 38-52 minutes  Treatment plan will be reviewed in 90 days or when goals have been changed.       MeasurableTreatment Goal(s) related to diagnosis / functional impairment(s)  Goal 1: Decrease severity of depression symptoms.       Objective #A (Patient Action)    Patient will Decrease frequency and intensity of feeling down, depressed, hopeless.  Status: New - Date: 7/13/2023  Continued dates: 10/19/2023    Intervention(s)  Client Centered  CBT  Psycho-dynamic  Internal Family Systems  Psycho-education    Objective #B  Patient will Improve quantity and quality of night time sleep / decrease daytime naps.  Status: New - Date: 7/13/2023  Continued dates: 10/19/2023    Intervention(s)  Client Centered  CBT  Internal Family Systems  Psycho-education  Mindulness    Objective #C  Patient will Identify negative self-talk and behaviors: challenge core beliefs, myths, and actions.  Status: New - Date: 7/13/2023  Continued dates: 10/19/2023    Intervention(s)  Client Centered  CBT  Internal Family Systems  Psycho-education      Goal 2: Decrease severity of anxiety related symptoms.       Objective #A (Patient Action)    Status: New - Date: 7/13/2023  Continued dates: 10/19/2023    Patient will identify triggers/ fears / thoughts that contribute to feeling anxious.    Intervention(s)  Psychodynamic  CBT  Internal Family Systems    Objective #B  Patient will use 1 or more coping skills for anxiety management each week.   Status: New - Date: 7/13/2023  Continued dates: 10/19/2023    Intervention(s)  Therapist will teach emotional regulation skills. (mindfulness,  breathing techniques, progressive muscle relaxation) .  Co-develop short-term goals and review progress in session.   CBT  Internal Family Systems  Mindfulness      Goal 3: Improve interpersonal relationships.       Objective #A (Patient Action)    Status: New - Date: 7/13/2023  Continued dates: 10/19/2023    Patient will compile a list of boundaries that they would like to set with others.    Intervention(s)  Client Centered  Motivational Interviewing  Psycho-education  Psychodynamic  Internal Family Systems.     Objective #B  Patient will practice setting boundaries 1 or more times each week.    Status: New - Date: 7/13/2023  Continued dates: 10/19/2023    Intervention(s)  Client Centered  Motivational Interviewing  Co-develop short-term goals and review progress in session.         Patient has reviewed and agreed to the above plan.      Aidee Conti, [facility]  October 19, 2023

## 2023-12-12 ENCOUNTER — VIRTUAL VISIT (OUTPATIENT)
Dept: PSYCHOLOGY | Facility: CLINIC | Age: 53
End: 2023-12-12
Payer: COMMERCIAL

## 2023-12-12 DIAGNOSIS — F33.1 MAJOR DEPRESSIVE DISORDER, RECURRENT EPISODE, MODERATE (H): ICD-10-CM

## 2023-12-12 DIAGNOSIS — F43.22 ADJUSTMENT DISORDER WITH ANXIOUS MOOD: Primary | ICD-10-CM

## 2023-12-12 PROCEDURE — 90834 PSYTX W PT 45 MINUTES: CPT | Mod: VID | Performed by: SOCIAL WORKER

## 2023-12-12 NOTE — PROGRESS NOTES
[facility]                                     Progress Note    Patient Name: Dalia Hart  Date: 2023         Service Type: Individual      Session Start Time: 2:08 PM  Session End Time: 2:47 PM     Session Length:    39 min     Session #: 18    Attendees: Client attended alone    Service Modality: Phone Visit:      Provider verified identity through the following two step process.  Patient provided:  Patient  and Patient is known previously to provider     Telemedicine Visit: The patient's condition can be safely assessed and treated via synchronous audio and visual telemedicine encounter.      Reason for Telemedicine Visit: Patient convenience (e.g. access to timely appointments / distance to available provider)    Originating Site (Patient Location): Patient's home    Distant Site (Provider Location): Provider Remote Setting- Home Office    Consent:  The patient/guardian has verbally consented to: the potential risks and benefits of telemedicine (video visit) versus in person care; bill my insurance or make self-payment for services provided; and responsibility for payment of non-covered services.   Distant Location (Provider):  Off-site    As the provider I attest to compliance with applicable laws and regulations related to telemedicine.    DATA  Interactive Complexity: No  Crisis: No        Progress Since Last Session (Related to Symptoms / Goals / Homework):   Symptoms: No change in symptoms reported.       Homework: Partially completed      Episode of Care Goals: Satisfactory progress - ACTION (Actively working towards change); Intervened by reinforcing change plan / affirming steps taken     Current / Ongoing Stressors and Concerns:   The patient continues to present with depression and adjustment disorder symptoms. The patient identified the following stressors and concerns: interpersonal stress. The patient processed through her internal experiences related to:  her job/work/employment opportunities, her recent interpersonal interactions, her interpersonal relationships, and dating.     Treatment Objective(s) Addressed in This Session:   Continued to address:   Patient will identify triggers/ fears / thoughts that contribute to feeling anxious   Patient will use 1 or more coping skills for anxiety management each week.    Patient will practice setting boundaries 1 or more times each week.      Intervention:   Client Centered   Psychodynamic   Modeling assertive communication strategies   Integrative Psychotherapy  Motivational Interviewing   Validation and Normalization   Co-develop short-term goals and review progress in session.    Assessments completed prior to visit: None  The following assessments were completed by patient for this visit: None     ASSESSMENT: Current Emotional / Mental Status (status of significant symptoms):   Risk status (Self / Other harm or suicidal ideation)   Patient denies current fears or concerns for personal safety.   Patient denies current or recent suicidal ideation or behaviors.   Patient denies current or recent homicidal ideation or behaviors.   Patient denies current or recent self injurious behavior or ideation.   Patient denies other safety concerns.   Patient reports there has been no change in risk factors since their last session.     Patient reports there has been no change in protective factors since their last session.     Recommended that patient call 911 or go to the local ED should there be a change in any of these risk factors.     Appearance:   Appropriate    Eye Contact:   Good    Psychomotor Behavior: Normal    Attitude:   Cooperative  Interested Friendly Pleasant Attentive   Orientation:   Person Place Time Situation All   Speech    Rate / Production: Normal/ Responsive    Volume:  Normal    Mood:    Normal   Affect:    Appropriate    Thought Content:  Clear    Thought Form:  Coherent  Goal Directed  Logical     Insight:    Good      Medication Review:   No current psychiatric medications prescribed     Medication Compliance:   NA     Changes in Health Issues:   None reported     Chemical Use Review:   Substance Use: Chemical use reviewed, no active concerns identified      Tobacco Use: Not addressed in visit.     Diagnosis:  1. Adjustment disorder with anxious mood    2. Major depressive disorder, recurrent episode, moderate (H)        R/O: ADHD    Collateral Reports Completed:   Not Applicable    PLAN: (Patient Tasks / Therapist Tasks / Other  Patient plans to continue to journal and read her book.   Patient continue to maintain healthy interpersonal boundaries.   Patient plans to return for follow up: 12/21/2023    In a future session: patient plans to make a strict schedule so that she doesn't overwork herself. Pattient plans to come up with a plan to manage her SAD  Short-term goals on hold: Patient plans to continue to set 15 minute challenges for herself to help herself complete non-preferred tasks.   Patient plans to right down 1-3 things she is grateful for each morning.  Patient also plans to create a budget.     Aidee Conti, [facility]                                                         ______________________________________________________________________    Individual Treatment Plan    Patient's Name: Dalia Hart  YOB: 1970    Date of Creation: 7/13/2023  Date Treatment Plan Last Reviewed/Revised: 10/19/2023    DSM5 Diagnoses:   1. Major depressive disorder, recurrent episode, moderate (H)    2. Adjustment disorder with anxious mood      Psychosocial / Contextual Factors: Patient identified the following stressors/concerns: financial, patient recently lost her job, and parenting stress. Patent report that she is not getting a lot of sleep and reports that she does not always get regular sleep.   PROMIS (reviewed every 90 days): 21    Referral / Collaboration:  Referral to another  professional/service is not indicated at this time..    Anticipated number of session for this episode of care: 9-12 sessions  Anticipation frequency of session: Weekly  Anticipated Duration of each session: 38-52 minutes  Treatment plan will be reviewed in 90 days or when goals have been changed.       MeasurableTreatment Goal(s) related to diagnosis / functional impairment(s)  Goal 1: Decrease severity of depression symptoms.       Objective #A (Patient Action)    Patient will Decrease frequency and intensity of feeling down, depressed, hopeless.  Status: New - Date: 7/13/2023  Continued dates: 10/19/2023    Intervention(s)  Client Centered  CBT  Psycho-dynamic  Internal Family Systems  Psycho-education    Objective #B  Patient will Improve quantity and quality of night time sleep / decrease daytime naps.  Status: New - Date: 7/13/2023  Continued dates: 10/19/2023    Intervention(s)  Client Centered  CBT  Internal Family Systems  Psycho-education  Mindulness    Objective #C  Patient will Identify negative self-talk and behaviors: challenge core beliefs, myths, and actions.  Status: New - Date: 7/13/2023  Continued dates: 10/19/2023    Intervention(s)  Client Centered  CBT  Internal Family Systems  Psycho-education      Goal 2: Decrease severity of anxiety related symptoms.       Objective #A (Patient Action)    Status: New - Date: 7/13/2023  Continued dates: 10/19/2023    Patient will identify triggers/ fears / thoughts that contribute to feeling anxious.    Intervention(s)  Psychodynamic  CBT  Internal Family Systems    Objective #B  Patient will use 1 or more coping skills for anxiety management each week.   Status: New - Date: 7/13/2023  Continued dates: 10/19/2023    Intervention(s)  Therapist will teach emotional regulation skills. (mindfulness, breathing techniques, progressive muscle relaxation) .  Co-develop short-term goals and review progress in session.   CBT  Internal Family  Systems  Mindfulness      Goal 3: Improve interpersonal relationships.       Objective #A (Patient Action)    Status: New - Date: 7/13/2023  Continued dates: 10/19/2023    Patient will compile a list of boundaries that they would like to set with others.    Intervention(s)  Client Centered  Motivational Interviewing  Psycho-education  Psychodynamic  Internal Family Systems.     Objective #B  Patient will practice setting boundaries 1 or more times each week.    Status: New - Date: 7/13/2023  Continued dates: 10/19/2023    Intervention(s)  Client Centered  Motivational Interviewing  Co-develop short-term goals and review progress in session.         Patient has reviewed and agreed to the above plan.      Aidee Conti, [facility]  October 19, 2023

## 2023-12-28 ENCOUNTER — VIRTUAL VISIT (OUTPATIENT)
Dept: PSYCHOLOGY | Facility: CLINIC | Age: 53
End: 2023-12-28
Payer: COMMERCIAL

## 2023-12-28 DIAGNOSIS — R41.840 ATTENTION OR CONCENTRATION DEFICIT: ICD-10-CM

## 2023-12-28 DIAGNOSIS — F43.22 ADJUSTMENT DISORDER WITH ANXIOUS MOOD: Primary | ICD-10-CM

## 2023-12-28 DIAGNOSIS — F33.1 MAJOR DEPRESSIVE DISORDER, RECURRENT EPISODE, MODERATE (H): ICD-10-CM

## 2023-12-28 PROCEDURE — 90837 PSYTX W PT 60 MINUTES: CPT | Mod: VID | Performed by: SOCIAL WORKER

## 2023-12-28 NOTE — PROGRESS NOTES
[facility]                                     Progress Note    Patient Name: Dalia Hart  Date: 2023       Service Type: Individual      Session Start Time: 1:07 PM  Session End Time: 2:04 PM     Session Length:  57   min (Session was 53+ minutes in length due to: patient thought content, content of session, progress review and scheduling)    Session #: 19    Attendees: Client attended alone    Service Modality: Phone Visit:      Provider verified identity through the following two step process.  Patient provided:  Patient  and Patient is known previously to provider     Telemedicine Visit: The patient's condition can be safely assessed and treated via synchronous audio and visual telemedicine encounter.      Reason for Telemedicine Visit: Patient convenience (e.g. access to timely appointments / distance to available provider)    Originating Site (Patient Location): Patient's home    Distant Site (Provider Location): [facility]    Consent:  The patient/guardian has verbally consented to: the potential risks and benefits of telemedicine (video visit) versus in person care; bill my insurance or make self-payment for services provided; and responsibility for payment of non-covered services.   Distant Location (Provider):  On-site    As the provider I attest to compliance with applicable laws and regulations related to telemedicine.    DATA  Interactive Complexity: No  Crisis: No        Progress Since Last Session (Related to Symptoms / Goals / Homework):   Symptoms: No change in symptoms reported.   Patient continues to report and exhibit: depression, adjustment disorder with anxious mood and ADHD related symptoms.     Homework: Partially completed      Episode of Care Goals: Satisfactory progress - ACTION (Actively working towards change); Intervened by reinforcing change plan / affirming steps taken     Current /  Ongoing Stressors and Concerns:   The patient continues to present with depression and adjustment disorder symptoms. The patient also continues to report and exhibit and ADHD related symptoms. The patient identified the following stressors and concerns: she has been feeling sick for the past few days and financial stress. The patient processed through her internal experiences related to: her recent trip with her niece, work, her health, her financial situation, her spending habits, his recent interpersonal interactions, and her interpersonal relationships.     Treatment Objective(s) Addressed in This Session:   Improve quantity and quality of night time sleep / decrease daytime naps  Continued to address:   Patient will identify triggers/ fears / thoughts that contribute to feeling anxious    Patient will practice setting boundaries 1 or more times each week.      Intervention:   Client Centered   Psychodynamic   Integrative Psychotherapy  ACT  Motivational Interviewing   Validation and Normalization   Co-develop short-term goals and review progress in session.    Assessments completed prior to visit: None  The following assessments were completed by patient for this visit: None     ASSESSMENT: Current Emotional / Mental Status (status of significant symptoms):   Risk status (Self / Other harm or suicidal ideation)   Patient denies current fears or concerns for personal safety.   Patient denies current or recent suicidal ideation or behaviors.   Patient denies current or recent homicidal ideation or behaviors.   Patient denies current or recent self injurious behavior or ideation.   Patient denies other safety concerns.   Patient reports there has been no change in risk factors since their last session.     Patient reports there has been no change in protective factors since their last session.     Recommended that patient call 911 or go to the local ED should there be a change in any of these risk  factors.     Appearance:   Appropriate    Eye Contact:   Good    Psychomotor Behavior: Normal    Attitude:   Cooperative  Interested Friendly Pleasant Attentive   Orientation:   Person Place Time Situation All   Speech    Rate / Production: Normal/ Responsive    Volume:  Normal    Mood:    Normal   Affect:    Appropriate    Thought Content:  Clear    Thought Form:  Coherent  Goal Directed  Logical    Insight:    Good      Medication Review:   No current psychiatric medications prescribed     Medication Compliance:   NA     Changes in Health Issues:   None reported     Chemical Use Review:   Substance Use: Chemical use reviewed, no active concerns identified      Tobacco Use: Not addressed in visit.     Diagnosis:  1. Adjustment disorder with anxious mood    2. Major depressive disorder, recurrent episode, moderate (H)    3. Attention or concentration deficit      R/O: ADHD    Collateral Reports Completed:   Not Applicable    PLAN: (Patient Tasks / Therapist Tasks / Other  Patient plans to continue to journal and read her book.   Patient continue to maintain healthy interpersonal boundaries.   Patient will consider making a budget.  Patient plans to return for follow up: 1/03/2024    In a future session: patient plans to make a strict schedule so that she doesn't overwork herself. Pattient plans to come up with a plan to manage her SAD  Short-term goals on hold: Patient plans to continue to set 15 minute challenges for herself to help herself complete non-preferred tasks.   Patient plans to right down 1-3 things she is grateful for each morning.      Aidee Conti, [facility]                                                         ______________________________________________________________________    Individual Treatment Plan    Patient's Name: Dalia Hart  YOB: 1970    Date of Creation: 7/13/2023  Date Treatment Plan Last Reviewed/Revised: 10/19/2023    DSM5 Diagnoses:   1. Major depressive  disorder, recurrent episode, moderate (H)    2. Adjustment disorder with anxious mood      Psychosocial / Contextual Factors: Patient identified the following stressors/concerns: financial, patient recently lost her job, and parenting stress. Patent report that she is not getting a lot of sleep and reports that she does not always get regular sleep.   PROMIS (reviewed every 90 days): 21    Referral / Collaboration:  Referral to another professional/service is not indicated at this time..    Anticipated number of session for this episode of care: 9-12 sessions  Anticipation frequency of session: Weekly  Anticipated Duration of each session: 38-52 minutes  Treatment plan will be reviewed in 90 days or when goals have been changed.       MeasurableTreatment Goal(s) related to diagnosis / functional impairment(s)  Goal 1: Decrease severity of depression symptoms.       Objective #A (Patient Action)    Patient will Decrease frequency and intensity of feeling down, depressed, hopeless.  Status: New - Date: 7/13/2023  Continued dates: 10/19/2023    Intervention(s)  Client Centered  CBT  Psycho-dynamic  Internal Family Systems  Psycho-education    Objective #B  Patient will Improve quantity and quality of night time sleep / decrease daytime naps.  Status: New - Date: 7/13/2023  Continued dates: 10/19/2023    Intervention(s)  Client Centered  CBT  Internal Family Systems  Psycho-education  Mindulness    Objective #C  Patient will Identify negative self-talk and behaviors: challenge core beliefs, myths, and actions.  Status: New - Date: 7/13/2023  Continued dates: 10/19/2023    Intervention(s)  Client Centered  CBT  Internal Family Systems  Psycho-education      Goal 2: Decrease severity of anxiety related symptoms.       Objective #A (Patient Action)    Status: New - Date: 7/13/2023  Continued dates: 10/19/2023    Patient will identify triggers/ fears / thoughts that contribute to feeling  anxious.    Intervention(s)  Psychodynamic  CBT  Internal Family Systems    Objective #B  Patient will use 1 or more coping skills for anxiety management each week.   Status: New - Date: 7/13/2023  Continued dates: 10/19/2023    Intervention(s)  Therapist will teach emotional regulation skills. (mindfulness, breathing techniques, progressive muscle relaxation) .  Co-develop short-term goals and review progress in session.   CBT  Internal Family Systems  Mindfulness      Goal 3: Improve interpersonal relationships.       Objective #A (Patient Action)    Status: New - Date: 7/13/2023  Continued dates: 10/19/2023    Patient will compile a list of boundaries that they would like to set with others.    Intervention(s)  Client Centered  Motivational Interviewing  Psycho-education  Psychodynamic  Internal Family Systems.     Objective #B  Patient will practice setting boundaries 1 or more times each week.    Status: New - Date: 7/13/2023  Continued dates: 10/19/2023    Intervention(s)  Client Centered  Motivational Interviewing  Co-develop short-term goals and review progress in session.         Patient has reviewed and agreed to the above plan.      Aidee Conti, [facility]  October 19, 2023

## 2023-12-31 ENCOUNTER — OFFICE VISIT (OUTPATIENT)
Dept: URGENT CARE | Facility: URGENT CARE | Age: 53
End: 2023-12-31
Payer: COMMERCIAL

## 2023-12-31 VITALS
TEMPERATURE: 97.2 F | HEART RATE: 64 BPM | SYSTOLIC BLOOD PRESSURE: 106 MMHG | DIASTOLIC BLOOD PRESSURE: 70 MMHG | HEIGHT: 66 IN | WEIGHT: 208 LBS | RESPIRATION RATE: 16 BRPM | BODY MASS INDEX: 33.43 KG/M2

## 2023-12-31 DIAGNOSIS — Z20.822 EXPOSURE TO 2019 NOVEL CORONAVIRUS: ICD-10-CM

## 2023-12-31 DIAGNOSIS — R05.1 ACUTE COUGH: ICD-10-CM

## 2023-12-31 DIAGNOSIS — N89.8 VAGINAL DISCHARGE: ICD-10-CM

## 2023-12-31 DIAGNOSIS — R07.0 THROAT PAIN: Primary | ICD-10-CM

## 2023-12-31 LAB
CLUE CELLS: ABNORMAL
DEPRECATED S PYO AG THROAT QL EIA: NEGATIVE
GROUP A STREP BY PCR: NOT DETECTED
TRICHOMONAS, WET PREP: ABNORMAL
WBC'S/HIGH POWER FIELD, WET PREP: ABNORMAL
YEAST, WET PREP: ABNORMAL

## 2023-12-31 PROCEDURE — 99213 OFFICE O/P EST LOW 20 MIN: CPT | Performed by: PHYSICIAN ASSISTANT

## 2023-12-31 PROCEDURE — 87491 CHLMYD TRACH DNA AMP PROBE: CPT | Performed by: PHYSICIAN ASSISTANT

## 2023-12-31 PROCEDURE — 87635 SARS-COV-2 COVID-19 AMP PRB: CPT | Performed by: PHYSICIAN ASSISTANT

## 2023-12-31 PROCEDURE — 87591 N.GONORRHOEAE DNA AMP PROB: CPT | Performed by: PHYSICIAN ASSISTANT

## 2023-12-31 PROCEDURE — 87651 STREP A DNA AMP PROBE: CPT | Performed by: PHYSICIAN ASSISTANT

## 2023-12-31 PROCEDURE — 87210 SMEAR WET MOUNT SALINE/INK: CPT | Performed by: PHYSICIAN ASSISTANT

## 2023-12-31 NOTE — PROGRESS NOTES
SUBJECTIVE:   Dalia Hart is a 53 year old female presenting with a chief complaint of   Chief Complaint   Patient presents with    Urgent Care     Concern of strep or Covid, coughing and sore throat. Sick since Monday.     Vaginal Problem     Would like to be tested for BV       She is an established patient of [facility].    -sick for 6 days with cough, runny nose, body aches  -no SOB, no CP  -exposure to her niece with COVID recently  -not updated with COVID vaccine    -also has vaginal discharge and would like  STI screening    Review of Systems    Past Medical History:   Diagnosis Date    Morbid obesity (H)      Family History   Problem Relation Age of Onset    Diabetes Type 2  Maternal Grandmother     Myocardial Infarction Maternal Grandmother         later in life    Diabetes Type 2  Maternal Aunt     Coronary Artery Disease Maternal Aunt         s/p PCI later in life    Cerebrovascular Disease No family hx of     Coronary Artery Disease Early Onset No family hx of     Breast Cancer No family hx of     Colon Cancer No family hx of     Ovarian Cancer No family hx of      Current Outpatient Medications   Medication Sig Dispense Refill    fluticasone (FLONASE) 50 MCG/ACT nasal spray Spray 2 sprays into both nostrils daily 18 mL 11    aspirin 81 MG chewable tablet Take 81 mg by mouth daily      cetirizine (ZYRTEC) 10 MG tablet Take 1 tablet (10 mg) by mouth daily (Patient not taking: Reported on 10/16/2023) 30 tablet 3    diphenoxylate-atropine (LOMOTIL) 2.5-0.025 MG tablet Take 1 tablet by mouth 3 times daily as needed for diarrhea 15 tablet 0    doxycycline hyclate (VIBRAMYCIN) 100 MG capsule       estradiol (ESTRACE) 0.1 MG/GM vaginal cream  (Patient not taking: Reported on 10/16/2023)      fluconazole (DIFLUCAN) 150 MG tablet       fluconazole (DIFLUCAN) 150 MG tablet Take 150 mg by mouth (Patient not taking: Reported on 7/15/2023)      fluticasone (FLONASE) 50 MCG/ACT nasal spray Spray 1 spray into both  "nostrils daily 15.8 mL 0    fluticasone (FLONASE) 50 MCG/ACT nasal spray Spray 1 spray into both nostrils daily 18.2 mL 1    metroNIDAZOLE (METROGEL) 0.75 % vaginal gel  (Patient not taking: Reported on 10/16/2023)      MICONAZOLE 7 2 % cream INSERT 1 APPLICATORFUL VAGINALLY AT BEDTIME FOR 7 DAYS      mometasone (NASONEX) 50 MCG/ACT nasal spray Spray 2 sprays into both nostrils daily (Patient not taking: Reported on 10/16/2023) 17 g 0    nicotine polacrilex (NICORETTE) 2 MG gum 1 piece of gum every 1-2 hours as needed for smoking cessation (Patient not taking: Reported on 10/16/2023) 100 each 1    nitroFURantoin macrocrystal-monohydrate (MACROBID) 100 MG capsule  (Patient not taking: Reported on 10/16/2023)      phenazopyridine (PYRIDIUM) 100 MG tablet       sulfamethoxazole-trimethoprim (BACTRIM DS) 800-160 MG tablet  (Patient not taking: Reported on 10/16/2023)      tretinoin (RETIN-A) 0.025 % external cream Apply topically to face nightly.  Start slowly and titrate up to nightly as tolerated (Patient not taking: Reported on 10/16/2023)      valACYclovir (VALTREX) 1000 mg tablet  (Patient not taking: Reported on 10/16/2023)       Social History     Tobacco Use    Smoking status: Some Days     Packs/day: 0.25     Years: 11.00     Additional pack years: 0.00     Total pack years: 2.75     Types: Cigarettes    Smokeless tobacco: Never    Tobacco comments:     1 pack per month   Substance Use Topics    Alcohol use: No     Alcohol/week: 0.0 standard drinks of alcohol       OBJECTIVE  /70   Pulse 64   Temp 97.2  F (36.2  C) (Temporal)   Resp 16   Ht 1.676 m (5' 6\")   Wt 94.3 kg (208 lb)   BMI 33.57 kg/m      Physical Exam  Vitals and nursing note reviewed.   Cardiovascular:      Rate and Rhythm: Normal rate and regular rhythm.   Pulmonary:      Effort: Pulmonary effort is normal.      Breath sounds: Normal breath sounds and air entry.   Lymphadenopathy:      Cervical: No cervical adenopathy. "         Labs:  Results for orders placed or performed in visit on 12/31/23 (from the past 24 hour(s))   Streptococcus A Rapid Screen w/Reflex to PCR - Clinic Collect    Specimen: Throat; Swab   Result Value Ref Range    Group A Strep antigen Negative Negative   Wet prep - Clinic Collect    Specimen: Vagina; Swab   Result Value Ref Range    Trichomonas Absent Absent    Yeast Absent Absent    Clue Cells Absent Absent    WBCs/high power field 2+ (A) None       ASSESSMENT:    -reassuring exam  -COVID exposure, high suspicion for this  -neg vag swab  -isolation precautions reviewed, note for work  -SUPPORTIVE CARE FOR CURRENT SYMPTOMS DISCUSSED INCLUDING FEVER MANAGEMENT WITH TYLENOL OR IBUPROFEN IN APPROPRIATE DOSE FOR WEIGHT.    MONITOR FOR SYMPTOMS OF DEHYDRATION AND RESPIRATORY DISTRESS WHICH WERE DISCUSSED.  FOLLOW UP IF SYMPTOMS WORSEN OR FAIL TO IMPROVE.        ICD-10-CM    1. Throat pain  R07.0 Streptococcus A Rapid Screen w/Reflex to PCR - Clinic Collect     Symptomatic COVID-19 Virus (Coronavirus) by PCR Nose     Group A Streptococcus PCR Throat Swab      2. Vaginal discharge  N89.8 Wet prep - Clinic Collect     Neisseria gonorrhoeae PCR - Clinic Collect     Chlamydia trachomatis PCR - Clinic Collect      3. Acute cough  R05.1       4. Exposure to 2019 novel coronavirus  Z20.822            Medical Decision Making:    Differential Diagnosis:  Flu, covid, BV, GCC, viral URI    Serious Comorbid Conditions:  Adult:  None    Followup:    If not improving or if conditions worsens over the next 12-24 hours, go to the Emergency Department    There are no Patient Instructions on file for this visit.

## 2023-12-31 NOTE — LETTER
December 31, 2023      Dalia Hart  [address] [facility] [address]        To Whom It May Concern:    Dalia Hart  was seen on 12/31/2023.  Please excuse her  until 1/2/24 due to illness.  COVID testing is pending and pt will be notified.        Sincerely,        Gabriela Nugent PA-C

## 2024-01-01 LAB
C TRACH DNA SPEC QL NAA+PROBE: NEGATIVE
N GONORRHOEA DNA SPEC QL NAA+PROBE: NEGATIVE
SARS-COV-2 RNA RESP QL NAA+PROBE: NEGATIVE

## 2024-01-03 ENCOUNTER — VIRTUAL VISIT (OUTPATIENT)
Dept: PSYCHOLOGY | Facility: CLINIC | Age: 54
End: 2024-01-03
Payer: COMMERCIAL

## 2024-01-03 DIAGNOSIS — F33.1 MAJOR DEPRESSIVE DISORDER, RECURRENT EPISODE, MODERATE (H): ICD-10-CM

## 2024-01-03 DIAGNOSIS — F43.22 ADJUSTMENT DISORDER WITH ANXIOUS MOOD: Primary | ICD-10-CM

## 2024-01-03 DIAGNOSIS — R41.840 ATTENTION OR CONCENTRATION DEFICIT: ICD-10-CM

## 2024-01-03 PROCEDURE — 90834 PSYTX W PT 45 MINUTES: CPT | Mod: 95 | Performed by: SOCIAL WORKER

## 2024-01-03 NOTE — PROGRESS NOTES
[facility]                                     Progress Note    Patient Name: Dalia Hart  Date: January 3, 2024         Service Type: Individual      Session Start Time: 3:13 PM  Session End Time: 4:05 PM     Session Length:   52  min (Session was 53+ minutes in length due to: patient thought content, content of session, progress review and scheduling)    Session #: 20    Attendees: Client attended alone    Service Modality: Phone Visit:      Provider verified identity through the following two step process.  Patient provided:  Patient  and Patient is known previously to provider     Telemedicine Visit: The patient's condition can be safely assessed and treated via synchronous audio and visual telemedicine encounter.      Reason for Telemedicine Visit: Patient convenience (e.g. access to timely appointments / distance to available provider)    Originating Site (Patient Location): Patient's home    Distant Site (Provider Location): [facility] [place]    Consent:  The patient/guardian has verbally consented to: the potential risks and benefits of telemedicine (video visit) versus in person care; bill my insurance or make self-payment for services provided; and responsibility for payment of non-covered services.   Distant Location (Provider):  On-site    As the provider I attest to compliance with applicable laws and regulations related to telemedicine.    DATA  Interactive Complexity: No  Crisis: No        Progress Since Last Session (Related to Symptoms / Goals / Homework):   Symptoms: No change in symptoms reported.   Patient continues to report and exhibit: depression, adjustment disorder with anxious mood and ADHD related symptoms.     Homework: Partially completed      Episode of Care Goals: Satisfactory progress - ACTION (Actively working towards change); Intervened by reinforcing change plan / affirming steps taken     Current /  Ongoing Stressors and Concerns:   The patient continues to present with depression and adjustment disorder with anxious mood symptoms. The patient also continues to report and exhibit and ADHD related symptoms. The patient identified the following stressors and concerns: not been feeling well/has been sick, her upcoming travel plans, and interpersonal/dating related stress. The patient processed through her internal experiences related to: her jobs, her interpersonal relationships, her goals, her current living situation, and role as a parent     Treatment Objective(s) Addressed in This Session:   Continued to address:   Patient will identify triggers/ fears / thoughts that contribute to feeling anxious    Patient will practice setting boundaries 1 or more times each week.      Intervention:   Client Centered   Psychodynamic   Integrative Psychotherapy  ACT  Motivational Interviewing   Validation and Normalization   Co-develop short-term goals and review progress in session.    Assessments completed prior to visit: None  The following assessments were completed by patient for this visit: None     ASSESSMENT: Current Emotional / Mental Status (status of significant symptoms):   Risk status (Self / Other harm or suicidal ideation)   Patient denies current fears or concerns for personal safety.   Patient denies current or recent suicidal ideation or behaviors.   Patient denies current or recent homicidal ideation or behaviors.   Patient denies current or recent self injurious behavior or ideation.   Patient denies other safety concerns.   Patient reports there has been no change in risk factors since their last session.     Patient reports there has been no change in protective factors since their last session.     Recommended that patient call 911 or go to the local ED should there be a change in any of these risk factors.     Appearance:   Appropriate    Eye Contact:   Good    Psychomotor Behavior: Normal     Attitude:   Cooperative  Interested Friendly Pleasant Attentive   Orientation:   Person Place Time Situation All   Speech    Rate / Production: Normal/ Responsive    Volume:  Normal    Mood:    Normal   Affect:    Appropriate  Lethargic    Thought Content:  Clear    Thought Form:  Coherent  Goal Directed  Logical    Insight:    Good      Medication Review:   No current psychiatric medications prescribed     Medication Compliance:   NA     Changes in Health Issues:   None reported     Chemical Use Review:   Substance Use: Chemical use reviewed, no active concerns identified      Tobacco Use: Not addressed in visit.     Diagnosis:  1. Adjustment disorder with anxious mood    2. Major depressive disorder, recurrent episode, moderate (H)    3. Attention or concentration deficit      R/O: ADHD    Collateral Reports Completed:   Not Applicable    PLAN: (Patient Tasks / Therapist Tasks / Other  Patient plans to continue to journal and read her book.   Patient continue to maintain healthy interpersonal boundaries.   Patient will consider making a budget.  Patient plans to clean off her side table and desk before she goes.   Patient plans to continue to go through her belongings and will donate what she no longer needs.   Patient plans to return for follow up: 1/16/2024    Next Session: Update treatment plan    In a future session: patient plans to make a strict schedule so that she doesn't overwork herself. Pattient plans to come up with a plan to manage her SAD  Short-term goals on hold: Patient plans to continue to set 15 minute challenges for herself to help herself complete non-preferred tasks.   Patient plans to right down 1-3 things she is grateful for each morning.      Aidee Conti, [facility]                                                         ______________________________________________________________________    Individual Treatment Plan    Patient's Name: Dalia Hart  Date Of  Birth: 1970    Date of Creation: 7/13/2023  Date Treatment Plan Last Reviewed/Revised: 10/19/2023    DSM5 Diagnoses:   1. Major depressive disorder, recurrent episode, moderate (H)    2. Adjustment disorder with anxious mood      Psychosocial / Contextual Factors: Patient identified the following stressors/concerns: financial, patient recently lost her job, and parenting stress. Patent report that she is not getting a lot of sleep and reports that she does not always get regular sleep.   PROMIS (reviewed every 90 days): 21    Referral / Collaboration:  Referral to another professional/service is not indicated at this time..    Anticipated number of session for this episode of care: 9-12 sessions  Anticipation frequency of session: Weekly  Anticipated Duration of each session: 38-52 minutes  Treatment plan will be reviewed in 90 days or when goals have been changed.       MeasurableTreatment Goal(s) related to diagnosis / functional impairment(s)  Goal 1: Decrease severity of depression symptoms.       Objective #A (Patient Action)    Patient will Decrease frequency and intensity of feeling down, depressed, hopeless.  Status: New - Date: 7/13/2023  Continued dates: 10/19/2023    Intervention(s)  Client Centered  CBT  Psycho-dynamic  Internal Family Systems  Psycho-education    Objective #B  Patient will Improve quantity and quality of night time sleep / decrease daytime naps.  Status: New - Date: 7/13/2023  Continued dates: 10/19/2023    Intervention(s)  Client Centered  CBT  Internal Family Systems  Psycho-education  Mindulness    Objective #C  Patient will Identify negative self-talk and behaviors: challenge core beliefs, myths, and actions.  Status: New - Date: 7/13/2023  Continued dates: 10/19/2023    Intervention(s)  Client Centered  CBT  Internal Family Systems  Psycho-education      Goal 2: Decrease severity of anxiety related symptoms.       Objective #A (Patient Action)    Status: New - Date: 7/13/2023   Continued dates: 10/19/2023    Patient will identify triggers/ fears / thoughts that contribute to feeling anxious.    Intervention(s)  Psychodynamic  CBT  Internal Family Systems    Objective #B  Patient will use 1 or more coping skills for anxiety management each week.   Status: New - Date: 7/13/2023  Continued dates: 10/19/2023    Intervention(s)  Therapist will teach emotional regulation skills. (mindfulness, breathing techniques, progressive muscle relaxation) .  Co-develop short-term goals and review progress in session.   CBT  Internal Family Systems  Mindfulness      Goal 3: Improve interpersonal relationships.       Objective #A (Patient Action)    Status: New - Date: 7/13/2023  Continued dates: 10/19/2023    Patient will compile a list of boundaries that they would like to set with others.    Intervention(s)  Client Centered  Motivational Interviewing  Psycho-education  Psychodynamic  Internal Family Systems.     Objective #B  Patient will practice setting boundaries 1 or more times each week.    Status: New - Date: 7/13/2023  Continued dates: 10/19/2023    Intervention(s)  Client Centered  Motivational Interviewing  Co-develop short-term goals and review progress in session.         Patient has reviewed and agreed to the above plan.      Aidee Conti, [facility]  October 19, 2023

## 2024-01-09 ENCOUNTER — VIRTUAL VISIT (OUTPATIENT)
Dept: PSYCHOLOGY | Facility: CLINIC | Age: 54
End: 2024-01-09
Payer: COMMERCIAL

## 2024-01-09 DIAGNOSIS — F33.1 MAJOR DEPRESSIVE DISORDER, RECURRENT EPISODE, MODERATE (H): ICD-10-CM

## 2024-01-09 DIAGNOSIS — R41.840 ATTENTION OR CONCENTRATION DEFICIT: Primary | ICD-10-CM

## 2024-01-09 PROCEDURE — 90834 PSYTX W PT 45 MINUTES: CPT | Mod: 95 | Performed by: PSYCHOLOGIST
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
## 2024-01-16 ENCOUNTER — VIRTUAL VISIT (OUTPATIENT)
Dept: PSYCHOLOGY | Facility: CLINIC | Age: 54
End: 2024-01-16
Payer: COMMERCIAL

## 2024-01-16 DIAGNOSIS — F90.0 ADHD (ATTENTION DEFICIT HYPERACTIVITY DISORDER), INATTENTIVE TYPE: Primary | ICD-10-CM

## 2024-01-16 PROCEDURE — 90791 PSYCH DIAGNOSTIC EVALUATION: CPT | Mod: 95 | Performed by: PSYCHOLOGIST
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
## 2024-01-18 ENCOUNTER — VIRTUAL VISIT (OUTPATIENT)
Dept: PSYCHOLOGY | Facility: CLINIC | Age: 54
End: 2024-01-18
Payer: COMMERCIAL

## 2024-01-18 DIAGNOSIS — F43.22 ADJUSTMENT DISORDER WITH ANXIOUS MOOD: Primary | ICD-10-CM

## 2024-01-18 DIAGNOSIS — R41.840 ATTENTION OR CONCENTRATION DEFICIT: ICD-10-CM

## 2024-01-18 DIAGNOSIS — F33.1 MAJOR DEPRESSIVE DISORDER, RECURRENT EPISODE, MODERATE (H): ICD-10-CM

## 2024-01-18 PROCEDURE — 90834 PSYTX W PT 45 MINUTES: CPT | Mod: 93 | Performed by: SOCIAL WORKER

## 2024-01-18 NOTE — PROGRESS NOTES
[facility]                                     Progress Note    Patient Name: Dalia Hart  Date: 2024       Service Type: Individual      Session Start Time: 2:18 PM  Session End Time: 3:10 PM     Session Length:   52  min   Session #: 21    Attendees: Client attended alone    Service Modality: Phone Visit:      Provider verified identity through the following two step process.  Patient provided:  Patient  and Patient is known previously to provider     Telemedicine Visit: The patient's condition can be safely assessed and treated via synchronous audio and visual telemedicine encounter.      Reason for Telemedicine Visit: Patient convenience (e.g. access to timely appointments / distance to available provider)    Originating Site (Patient Location): Patient's home    Distant Site (Provider Location): [facility] [place]    Consent:  The patient/guardian has verbally consented to: the potential risks and benefits of telemedicine (video visit) versus in person care; bill my insurance or make self-payment for services provided; and responsibility for payment of non-covered services.   Distant Location (Provider):  On-site    As the provider I attest to compliance with applicable laws and regulations related to telemedicine.    DATA  Interactive Complexity: No  Crisis: No        Progress Since Last Session (Related to Symptoms / Goals / Homework):   Symptoms: No change in symptoms reported.   Patient continues to report and exhibit: depression, adjustment disorder with anxious mood and ADHD related symptoms.     Homework: Partially completed      Episode of Care Goals: Satisfactory progress - ACTION (Actively working towards change); Intervened by reinforcing change plan / affirming steps taken     Current / Ongoing Stressors and Concerns:   The patient continues to present with depression and adjustment disorder with anxious mood  symptoms. The patient also continues to report and exhibit and ADHD related symptoms. The patient identified the following stressors and concerns: interpersonal stress. The patient processed through her internal experiences related to: her trip to [place], her experiences visiting her friends in [place], her ADHD evaluation, psychiatric medications, her interpersonal relationships, and her upcoming trip to [place].      Treatment Objective(s) Addressed in This Session:   Continued to address:   Patient will identify triggers/ fears / thoughts that contribute to feeling anxious    Patient will practice setting boundaries 1 or more times each week.      Intervention:   Client Centered   Psychodynamic  ACT  Motivational Interviewing   Validation and Normalization   Co-develop short-term goals and review progress in session.    Assessments completed prior to visit: None  The following assessments were completed by patient for this visit: None     ASSESSMENT: Current Emotional / Mental Status (status of significant symptoms):   Risk status (Self / Other harm or suicidal ideation)   Patient denies current fears or concerns for personal safety.   Patient denies current or recent suicidal ideation or behaviors.   Patient denies current or recent homicidal ideation or behaviors.   Patient denies current or recent self injurious behavior or ideation.   Patient denies other safety concerns.   Patient reports there has been no change in risk factors since their last session.     Patient reports there has been no change in protective factors since their last session.     Recommended that patient call 911 or go to the local ED should there be a change in any of these risk factors.     Appearance:   Appropriate    Eye Contact:   Good    Psychomotor Behavior: Normal    Attitude:   Cooperative  Interested Friendly Pleasant Attentive   Orientation:   Person Place Time Situation All   Speech    Rate / Production: Normal/  Responsive    Volume:  Normal    Mood:    Normal   Affect:    Appropriate    Thought Content:  Clear    Thought Form:  Coherent  Goal Directed  Logical    Insight:    Good      Medication Review:   No current psychiatric medications prescribed     Medication Compliance:   NA     Changes in Health Issues:   None reported     Chemical Use Review:   Substance Use: Chemical use reviewed, no active concerns identified      Tobacco Use: Not addressed in visit.     Diagnosis:  1. Adjustment disorder with anxious mood    2. Major depressive disorder, recurrent episode, moderate (H)    3. Attention or concentration deficit        R/O: ADHD    Collateral Reports Completed:   Not Applicable    PLAN: (Patient Tasks / Therapist Tasks / Other  Patient plans to continue to read her book.   Patient continue to maintain healthy interpersonal boundaries.   Patient plans to clean for 1 hours/day.   Patient plans to  Patient plans to return for follow up: 02/01/2024      In a future session: patient plans to make a strict schedule so that she doesn't overwork herself. Pattient plans to come up with a plan to manage her SAD  Short-term goals on hold: Patient plans to continue to set 15 minute challenges for herself to help herself complete non-preferred tasks.   Patient plans to right down 1-3 things she is grateful for each morning.      Aidee Conti, [facility]                                                         ______________________________________________________________________    Individual Treatment Plan    Patient's Name: Dalia Hart  YOB: 1970    Date of Creation: 7/13/2023  Date Treatment Plan Last Reviewed/Revised: 1/18/2024    DSM5 Diagnoses:   1. Major depressive disorder, recurrent episode, moderate (H)    2. Adjustment disorder with anxious mood      Psychosocial / Contextual Factors: Patient identified the following stressors/concerns: financial, patient recently lost her job, and parenting  stress. Patent report that she is not getting a lot of sleep and reports that she does not always get regular sleep.   PROMIS (reviewed every 90 days): 23    Referral / Collaboration:  Referral to another professional/service is not indicated at this time..    Anticipated number of session for this episode of care: 9-12 sessions  Anticipation frequency of session: Weekly  Anticipated Duration of each session: 38-52 minutes  Treatment plan will be reviewed in 90 days or when goals have been changed.       MeasurableTreatment Goal(s) related to diagnosis / functional impairment(s)  Goal 1: Decrease severity of depression symptoms.       Objective #A (Patient Action)    Patient will Decrease frequency and intensity of feeling down, depressed, hopeless.  Status: New - Date: 7/13/2023  Continued dates: 10/19/2023, 1/18/2024    Intervention(s)  Client Centered  CBT  Psycho-dynamic  Internal Family Systems  Psycho-education    Objective #B  Patient will Improve quantity and quality of night time sleep / decrease daytime naps.  Status: New - Date: 7/13/2023  Continued dates: 10/19/2023, 1/18/2024    Intervention(s)  Client Centered  CBT  Internal Family Systems  Psycho-education  Mindulness    Objective #C  Patient will Identify negative self-talk and behaviors: challenge core beliefs, myths, and actions.  Status: New - Date: 7/13/2023  Continued dates: 10/19/2023, 1/18/2024    Intervention(s)  Client Centered  CBT  Internal Family Systems  Psycho-education      Goal 2: Decrease severity of anxiety related symptoms.       Objective #A (Patient Action)    Status: New - Date: 7/13/2023  Continued dates: 10/19/2023, 1/18/2024    Patient will identify triggers/ fears / thoughts that contribute to feeling anxious.    Intervention(s)  Psychodynamic  CBT  Internal Family Systems    Objective #B  Patient will use 1 or more coping skills for anxiety management each week.   Status: New - Date: 7/13/2023  Continued dates: 10/19/2023,  1/18/2024    Intervention(s)  Therapist will teach emotional regulation skills. (mindfulness, breathing techniques, progressive muscle relaxation) .  Co-develop short-term goals and review progress in session.   CBT  Internal Family Systems  Mindfulness      Goal 3: Improve interpersonal relationships.       Objective #A (Patient Action)    Status: New - Date: 7/13/2023  Continued dates: 10/19/2023, 1/18/2024    Patient will compile a list of boundaries that they would like to set with others.    Intervention(s)  Client Centered  Motivational Interviewing  Psycho-education  Psychodynamic  Internal Family Systems.     Objective #B  Patient will practice setting boundaries 1 or more times each week.    Status: New - Date: 7/13/2023  Continued dates: 10/19/2023, 1/18/2024    Intervention(s)  Client Centered  Motivational Interviewing  Co-develop short-term goals and review progress in session.         Patient has reviewed and agreed to the above plan.    Aidee Conti, [facility] January 18, 2024

## 2024-02-01 ENCOUNTER — VIRTUAL VISIT (OUTPATIENT)
Dept: PSYCHOLOGY | Facility: CLINIC | Age: 54
End: 2024-02-01
Payer: COMMERCIAL

## 2024-02-01 DIAGNOSIS — F33.1 MAJOR DEPRESSIVE DISORDER, RECURRENT EPISODE, MODERATE (H): ICD-10-CM

## 2024-02-01 DIAGNOSIS — F43.22 ADJUSTMENT DISORDER WITH ANXIOUS MOOD: Primary | ICD-10-CM

## 2024-02-01 DIAGNOSIS — F90.0 ADHD (ATTENTION DEFICIT HYPERACTIVITY DISORDER), INATTENTIVE TYPE: ICD-10-CM

## 2024-02-01 PROCEDURE — 90837 PSYTX W PT 60 MINUTES: CPT | Mod: 95 | Performed by: SOCIAL WORKER

## 2024-02-01 NOTE — PROGRESS NOTES
[facility]                                     Progress Note    Patient Name: Dalia Hart  Date: 2024       Service Type: Individual      Session Start Time: 2:13 PM  Session End Time: 3:08 PM     Session Length:    55 min   Session #: 22    Attendees: Client attended alone    Service Modality: Video Visit via [employer]      Provider verified identity through the following two step process.  Patient provided:  Patient  and Patient is known previously to provider     Telemedicine Visit: The patient's condition can be safely assessed and treated via synchronous audio and visual telemedicine encounter.      Reason for Telemedicine Visit: Patient convenience (e.g. access to timely appointments / distance to available provider)    Originating Site (Patient Location): Patient's home    Distant Site (Provider Location): [facility] [place]    Consent:  The patient/guardian has verbally consented to: the potential risks and benefits of telemedicine (video visit) versus in person care; bill my insurance or make self-payment for services provided; and responsibility for payment of non-covered services.   Distant Location (Provider):  On-site    As the provider I attest to compliance with applicable laws and regulations related to telemedicine.    DATA  Interactive Complexity: No  Crisis: No        Progress Since Last Session (Related to Symptoms / Goals / Homework):   Symptoms: No change in symptoms reported.   Patient continues to report and exhibit: depression, adjustment disorder with anxious mood and ADHD related symptoms.     Homework: Partially completed      Episode of Care Goals: Satisfactory progress - ACTION (Actively working towards change); Intervened by reinforcing change plan / affirming steps taken     Current / Ongoing Stressors and Concerns:   The patient continues to present with depression and adjustment disorder with  anxious mood symptoms. The patient also continues to report and exhibit and ADHD related symptoms. The patient identified the following stressors and concerns: dating/interpersonal stress and difficulty organizing/maintaining a tidy space.  The patient processed through her internal experiences related to: her interpersonal relationships, her recent interpersonal interactions, her recent dating experiences, her trip to [place], her experience hiking with her daughter, and work/employment opportunities.        Treatment Objective(s) Addressed in This Session:   Continued to address:   Patient will identify triggers/ fears / thoughts that contribute to feeling anxious    Patient will practice setting boundaries 1 or more times each week.      Intervention:   Client Centered   Psychodynamic  ACT  Motivational Interviewing   Validation and Normalization   Co-develop short-term goals and review progress in session.    Assessments completed prior to visit: None  The following assessments were completed by patient for this visit: None     ASSESSMENT: Current Emotional / Mental Status (status of significant symptoms):   Risk status (Self / Other harm or suicidal ideation)   Patient denies current fears or concerns for personal safety.   Patient denies current or recent suicidal ideation or behaviors.   Patient denies current or recent homicidal ideation or behaviors.   Patient denies current or recent self injurious behavior or ideation.   Patient denies other safety concerns.   Patient reports there has been no change in risk factors since their last session.     Patient reports there has been no change in protective factors since their last session.     Recommended that patient call 911 or go to the local ED should there be a change in any of these risk factors.     Appearance:   Appropriate    Eye Contact:   Good    Psychomotor Behavior: Normal    Attitude:   Cooperative  Interested Friendly Pleasant  Attentive   Orientation:   All   Speech    Rate / Production: Normal/ Responsive    Volume:  Normal    Mood:    Normal   Affect:    Appropriate    Thought Content:  Clear    Thought Form:  Coherent  Logical    Insight:    Good      Medication Review:   No current psychiatric medications prescribed     Medication Compliance:   NA     Changes in Health Issues:   None reported     Chemical Use Review:   Substance Use: Chemical use reviewed, no active concerns identified      Tobacco Use: Not addressed in visit.     Diagnosis:  1. Adjustment disorder with anxious mood    2. Major depressive disorder, recurrent episode, moderate (H)    3. ADHD (attention deficit hyperactivity disorder), inattentive type        Collateral Reports Completed:   Not Applicable    PLAN: (Patient Tasks / Therapist Tasks / Other  Patient plans to continue to organize her space.   Patient continue to maintain healthy interpersonal boundaries.   Patient plans to try to clean for 1 hours/day.   Patient plans to do her paperwork.   Patient plans to make a doctor's appointment.  Patient plans to return for follow up: 02/08/2024      In a future session: patient plans to make a strict schedule so that she doesn't overwork herself. Pattient plans to come up with a plan to manage her SAD  Short-term goals on hold: Patient plans to continue to set 15 minute challenges for herself to help herself complete non-preferred tasks.   Patient plans to right down 1-3 things she is grateful for each morning.      Aidee Conti, [facility]                                                         ______________________________________________________________________    Individual Treatment Plan    Patient's Name: Dalia Hart  YOB: 1970    Date of Creation: 7/13/2023  Date Treatment Plan Last Reviewed/Revised: 1/18/2024    DSM5 Diagnoses:   1. Major depressive disorder, recurrent episode, moderate (H)    2. Adjustment disorder with anxious mood       Psychosocial / Contextual Factors: Patient identified the following stressors/concerns: financial, patient recently lost her job, and parenting stress. Patent report that she is not getting a lot of sleep and reports that she does not always get regular sleep.   PROMIS (reviewed every 90 days): 23    Referral / Collaboration:  Referral to another professional/service is not indicated at this time..    Anticipated number of session for this episode of care: 9-12 sessions  Anticipation frequency of session: Weekly  Anticipated Duration of each session: 38-52 minutes  Treatment plan will be reviewed in 90 days or when goals have been changed.       MeasurableTreatment Goal(s) related to diagnosis / functional impairment(s)  Goal 1: Decrease severity of depression symptoms.       Objective #A (Patient Action)    Patient will Decrease frequency and intensity of feeling down, depressed, hopeless.  Status: New - Date: 7/13/2023  Continued dates: 10/19/2023, 1/18/2024    Intervention(s)  Client Centered  CBT  Psycho-dynamic  Internal Family Systems  Psycho-education    Objective #B  Patient will Improve quantity and quality of night time sleep / decrease daytime naps.  Status: New - Date: 7/13/2023  Continued dates: 10/19/2023, 1/18/2024    Intervention(s)  Client Centered  CBT  Internal Family Systems  Psycho-education  Mindulness    Objective #C  Patient will Identify negative self-talk and behaviors: challenge core beliefs, myths, and actions.  Status: New - Date: 7/13/2023  Continued dates: 10/19/2023, 1/18/2024    Intervention(s)  Client Centered  CBT  Internal Family Systems  Psycho-education      Goal 2: Decrease severity of anxiety related symptoms.       Objective #A (Patient Action)    Status: New - Date: 7/13/2023  Continued dates: 10/19/2023, 1/18/2024    Patient will identify triggers/ fears / thoughts that contribute to feeling anxious.    Intervention(s)  Psychodynamic  CBT  Internal Family  Systems    Objective #B  Patient will use 1 or more coping skills for anxiety management each week.   Status: New - Date: 7/13/2023  Continued dates: 10/19/2023, 1/18/2024    Intervention(s)  Therapist will teach emotional regulation skills. (mindfulness, breathing techniques, progressive muscle relaxation) .  Co-develop short-term goals and review progress in session.   CBT  Internal Family Systems  Mindfulness      Goal 3: Improve interpersonal relationships.       Objective #A (Patient Action)    Status: New - Date: 7/13/2023  Continued dates: 10/19/2023, 1/18/2024    Patient will compile a list of boundaries that they would like to set with others.    Intervention(s)  Client Centered  Motivational Interviewing  Psycho-education  Psychodynamic  Internal Family Systems.     Objective #B  Patient will practice setting boundaries 1 or more times each week.    Status: New - Date: 7/13/2023  Continued dates: 10/19/2023, 1/18/2024    Intervention(s)  Client Centered  Motivational Interviewing  Co-develop short-term goals and review progress in session.         Patient has reviewed and agreed to the above plan.    Aidee Conti, [facility] January 18, 2024

## 2024-02-05 ENCOUNTER — VIRTUAL VISIT (OUTPATIENT)
Dept: FAMILY MEDICINE | Facility: CLINIC | Age: 54
End: 2024-02-05
Payer: COMMERCIAL

## 2024-02-05 DIAGNOSIS — F90.0 ADHD (ATTENTION DEFICIT HYPERACTIVITY DISORDER), INATTENTIVE TYPE: Primary | ICD-10-CM

## 2024-02-05 DIAGNOSIS — Z86.59 HISTORY OF ADJUSTMENT DISORDER: ICD-10-CM

## 2024-02-05 PROCEDURE — 99214 OFFICE O/P EST MOD 30 MIN: CPT | Mod: 95 | Performed by: FAMILY MEDICINE

## 2024-02-05 RX ORDER — DEXTROAMPHETAMINE SACCHARATE, AMPHETAMINE ASPARTATE MONOHYDRATE, DEXTROAMPHETAMINE SULFATE AND AMPHETAMINE SULFATE 2.5; 2.5; 2.5; 2.5 MG/1; MG/1; MG/1; MG/1
10 CAPSULE, EXTENDED RELEASE ORAL DAILY
Qty: 30 CAPSULE | Refills: 0 | Status: SHIPPED | OUTPATIENT
Start: 2024-02-05 | End: 2024-07-02

## 2024-02-05 ASSESSMENT — PATIENT HEALTH QUESTIONNAIRE - PHQ9
10. IF YOU CHECKED OFF ANY PROBLEMS, HOW DIFFICULT HAVE THESE PROBLEMS MADE IT FOR YOU TO DO YOUR WORK, TAKE CARE OF THINGS AT HOME, OR GET ALONG WITH OTHER PEOPLE: SOMEWHAT DIFFICULT
SUM OF ALL RESPONSES TO PHQ QUESTIONS 1-9: 5
SUM OF ALL RESPONSES TO PHQ QUESTIONS 1-9: 5

## 2024-02-05 NOTE — PROGRESS NOTES
"Dalia is a 53 year old who is being evaluated via a billable video visit.      How would you like to obtain your AVS? MyChart  If the video visit is dropped, the invitation should be resent by: Text to cell phone: [phone]  Will anyone else be joining your video visit? No          Assessment & Plan     ADHD (attention deficit hyperactivity disorder), inattentive type  History of adjustment disorder with depression and anxiety (grief reaction)    She only underwent formal ADHD testing and was diagnosed with ADHD inattentive type.  She still has her appointment tomorrow to go over the results formally with the psychologist, but she would like to start treatment right away.  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  We discussed options and decided to try Adderall XR 10 mg daily in the morning.  Potential side effects were discussed.  She expressed understanding about this.  I recommended she schedule a physical exam/follow-up appointment in 1 month or sooner if needed to check in and see how things are going.  - amphetamine-dextroamphetamine (ADDERALL XR) 10 MG 24 hr capsule; Take 1 capsule (10 mg) by mouth daily    She will continue to follow with her therapist.            BMI  Estimated body mass index is 33.57 kg/m  as calculated from the following:    Height as of 12/31/23: 1.676 m (5' 6\").    Weight as of 12/31/23: 94.3 kg (208 lb).   Weight management plan: Discussed healthy diet and exercise guidelines          Subjective   Dalia is a 53 year old, presenting for the following health issues:  No chief complaint on file.    HPI           She describes having a long history of difficulty with focus, task completion, organization and distractibility.  She recently underwent formal ADHD testing which was consistent with ADHD inattentive type.  She has also had some depression and anxiety issues which she attributes to grief in the past.  She is working " with a therapist and these issues are stable.  She has never taken medication to help with ADHD before and would like to start something right away.  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    Review of Systems  Constitutional, HEENT, cardiovascular, pulmonary, GI, , musculoskeletal, neuro, skin, endocrine and psych systems are negative, except as otherwise noted.      Objective           Vitals:  No vitals were obtained today due to virtual visit.    Physical Exam   GENERAL: alert and no distress  EYES: Eyes grossly normal to inspection.  No discharge or erythema, or obvious scleral/conjunctival abnormalities.  RESP: No audible wheeze, cough, or visible cyanosis.    SKIN: Visible skin clear. No significant rash, abnormal pigmentation or lesions.  NEURO: Cranial nerves grossly intact.  Mentation and speech appropriate for age.  PSYCH: Appropriate affect, tone, and pace of words          Video-Visit Details    Type of service:  Video Visit   Video Start Time: 2:43 PM  Video End Time:3:00 PM    Originating Location (pt. Location): Home    Distant Location (provider location):  On-site  Platform used for Video Visit: [employer]  Signed Electronically by: Yony Alonzo DO

## 2024-02-12 ENCOUNTER — HOSPITAL ENCOUNTER (EMERGENCY)
Facility: CLINIC | Age: 54
Discharge: HOME | End: 2024-02-12
Attending: EMERGENCY MEDICINE | Admitting: EMERGENCY MEDICINE
Payer: COMMERCIAL

## 2024-02-12 ENCOUNTER — NURSE TRIAGE (OUTPATIENT)
Dept: NURSING | Facility: CLINIC | Age: 54
End: 2024-02-12
Payer: COMMERCIAL

## 2024-02-12 ENCOUNTER — APPOINTMENT (OUTPATIENT)
Dept: ULTRASOUND IMAGING | Facility: CLINIC | Age: 54
End: 2024-02-12
Attending: EMERGENCY MEDICINE
Payer: COMMERCIAL

## 2024-02-12 VITALS
SYSTOLIC BLOOD PRESSURE: 111 MMHG | HEART RATE: 86 BPM | DIASTOLIC BLOOD PRESSURE: 61 MMHG | TEMPERATURE: 98 F | RESPIRATION RATE: 18 BRPM | OXYGEN SATURATION: 96 %

## 2024-02-12 DIAGNOSIS — I83.92 VARICOSE VEINS OF LEFT LOWER EXTREMITY, UNSPECIFIED WHETHER COMPLICATED: ICD-10-CM

## 2024-02-12 PROCEDURE — 93971 EXTREMITY STUDY: CPT | Mod: LT

## 2024-02-12 PROCEDURE — 99284 EMERGENCY DEPT VISIT MOD MDM: CPT | Mod: 25

## 2024-02-12 ASSESSMENT — ACTIVITIES OF DAILY LIVING (ADL): ADLS_ACUITY_SCORE: 33

## 2024-02-12 NOTE — TELEPHONE ENCOUNTER
"Nurse Triage SBAR    Is this a 2nd Level Triage? NO    Situation: Leg injury    Background: patient states that she slipped and fell getting out of a hot tub about 3 weeks ago.  She calls today because she can feel a \"small lump\" on the back of her thigh.  She states the bruise is about the the size of her hand. She denies swelling, denies redness.  She is able to walk on the leg without pain.     Assessment: Leg injury     Protocol Recommended Disposition:   See in Office Today or Tomorrow    Recommendation: Care advice given per protocol and call back precautions discussed.  Patient will go to urgent care today.      N/A    EDDIE MURGUIA RN    Does the patient meet one of the following criteria for ADS visit consideration? No    Reason for Disposition   Large swelling or bruise and size > palm of person's hand    Additional Information   Negative: Major bleeding (actively dripping or spurting) that can't be stopped   Negative: Bullet, stabbed by knife, or other serious penetrating wound   Negative: Looks like a dislocated joint (crooked or deformed)   Negative: Can't stand (bear weight) or walk   Negative: Serious injury with multiple fractures (broken bones)   Negative: Sounds like a life-threatening emergency to the triager   Negative: Wound looks infected   Negative: Leg pain from overuse (e.g., sports, running, physical work)   Negative: Leg pain not from an injury   Negative: Hip injury is main concern   Negative: Knee injury is main concern   Negative: Foot or ankle injury is main concern   Negative: SEVERE pain (e.g., excruciating pain, unable to do any normal activities)   Negative: Skin is split open or gaping (or length > 1/2 inch or 12 mm)   Negative: Bleeding won't stop after 10 minutes of direct pressure (using correct technique)   Negative: Dirt in the wound and not removed with 15 minutes of scrubbing   Negative: Sounds like a serious injury to the triager   Negative: Looks infected (e.g., " spreading redness, red streak, pus)   Negative: No prior tetanus shots (or is not fully vaccinated) and any wound (e.g., cut or scrape)   Negative: HIV positive or severe immunodeficiency (severely weak immune system) and DIRTY cut or scrape   Negative: Patient wants to be seen   Negative: Injury interferes with work or school    Protocols used: Leg Injury-A-[place]

## 2024-02-12 NOTE — ED TRIAGE NOTES
Presents to ED with c/o pain and swelling to L posterior thigh. Patient states she fell getting out of a hot tub 2-3 weeks ago and hit the back of her thigh on the ledge which caused a huge bruise. Since that time it has mostly healed but she now has a lump there and pain has not fully resolved. She is concerned it may be a clot. Denies SOB

## 2024-02-13 NOTE — ED PROVIDER NOTES
History     Chief Complaint:  Leg Pain       HPI   Dalia Hart is a 53 year old female who presents to the ED with leg pain. She explains that roughly three weeks ago while exiting a hot tub she slipped, striking the back of her left posterior thigh on the edge of the tub. She reports developing a large bruise thereafter. Today she felt a lump underneath the bruise which has now healed, prompting ED visit. She endorses pain when touching the site but no pain when ambulating. Denies chest pain, shortness of breath, fever, or the site being warm. Patient does not have history of clotting.     Independent Historian:   None - Patient Only    Review of External Notes:   None     Medications:    Adderall  Zyrtec  Estrace  Metrogel  Nasonex  Retina    Past Medical History:    Obesity  Tobacco abuse    Past Surgical History:    Past Surgical History:   Procedure Laterality Date    LASER ABLATION VEIN VARICOSE      right leg    TONSILLECTOMY & ADENOIDECTOMY  1975        Physical Exam   Patient Vitals for the past 24 hrs:   BP Temp Temp src Pulse Resp SpO2   02/12/24 2017 111/61 -- -- 86 18 96 %   02/12/24 1700 112/78 98  F (36.7  C) Temporal 72 18 97 %        Physical Exam  General: the patient is awake and interactive  HEENT:  Moist mucous membranes, conjunctiva normal  Pulmonary:  Normal respiratory effort  Cardiovascular:  Well perfused  Musculoskeletal:  Moving 4 extremities grossly wnl, no deformities  Left leg: no skin discoloration or palpable lumps/cords.  No skin redness.  No bruising.  No leg swelling.  Neuro:  Speech normal, no focal deficits  Psych:  Normal affect    Emergency Department Course     Imaging:  US Lower Extremity Venous Duplex Left   Final Result   IMPRESSION:   1.  No deep venous thrombosis in the left lower extremity.   2.  Varicose vein in the area of palpable abnormality at the posterior distal thigh.         Laboratory:  None     Procedures   None    Emergency Department Course &  Assessments:    Interventions:  Medications - No data to display     Assessments:   I obtained history and examined the patient as noted above.   I rechecked the patient and explained findings.    Independent Interpretation (X-rays, CTs, rhythm strip):  None    Consultations/Discussion of Management or Tests:  None        Social Determinants of Health affecting care:   None    Disposition:  The patient was discharged.     Impression & Plan      Medical Decision Makin-year-old female presenting to the emergency department for evaluation of a lump to the posterior left thigh after injury 3 weeks ago with now resolved bruise to that area.  She is concerned for blood clot.  She is otherwise vitally stable.  Denies chest pain or shortness of breath.  Ultrasound of the lower extremity is negative for DVT but showed a varicose vein in the area of palpable abnormality.  No obvious bruising noted to the area.  Doubt acute ischemic limb or arterial insufficiency.  Discussed ultrasound findings with the patient.  She is overall safe to discharge home and follow-up with her primary care doctor.  Discussed supportive cares for home.  She is comfortable with the plan.  Discussed return precautions for the ER.  All questions answered prior to discharge.    Diagnosis:    ICD-10-CM    1. Varicose veins of left lower extremity, unspecified whether complicated  I83.92            Discharge Medications:  Discharge Medication List as of 2024  8:15 PM           Scribe Disclosure:  I, OPHELIA DENT, am serving as a scribe at 7:50 PM on 2024 to document services personally performed by Amari Hough MD based on my observations and the provider's statements to me.     2024   Amari Hough MD [place], Edgar Ronald, MD  [id number]

## 2024-02-16 ENCOUNTER — VIRTUAL VISIT (OUTPATIENT)
Dept: PSYCHOLOGY | Facility: CLINIC | Age: 54
End: 2024-02-16
Payer: COMMERCIAL

## 2024-02-16 DIAGNOSIS — F90.0 ADHD (ATTENTION DEFICIT HYPERACTIVITY DISORDER), INATTENTIVE TYPE: ICD-10-CM

## 2024-02-16 DIAGNOSIS — F43.22 ADJUSTMENT DISORDER WITH ANXIOUS MOOD: Primary | ICD-10-CM

## 2024-02-16 DIAGNOSIS — F33.1 MAJOR DEPRESSIVE DISORDER, RECURRENT EPISODE, MODERATE (H): ICD-10-CM

## 2024-02-16 PROCEDURE — 90837 PSYTX W PT 60 MINUTES: CPT | Mod: 95 | Performed by: SOCIAL WORKER

## 2024-02-16 NOTE — PROGRESS NOTES
[facility]                                     Progress Note    Patient Name: Dalia Hart  Date: 2024       Service Type: Individual      Session Start Time: 10:42 M  Session End Time: 11:46  AM     Session Length:  64  min   Session #: 23    Attendees: Client attended alone    Service Modality: Video Visit via [employer]      Provider verified identity through the following two step process.  Patient provided:  Patient  and Patient is known previously to provider     Telemedicine Visit: The patient's condition can be safely assessed and treated via synchronous audio and visual telemedicine encounter.      Reason for Telemedicine Visit: Patient convenience (e.g. access to timely appointments / distance to available provider)    Originating Site (Patient Location): Patient's home    Distant Site (Provider Location): [facility] [place]    Consent:  The patient/guardian has verbally consented to: the potential risks and benefits of telemedicine (video visit) versus in person care; bill my insurance or make self-payment for services provided; and responsibility for payment of non-covered services.   Distant Location (Provider):  On-site    As the provider I attest to compliance with applicable laws and regulations related to telemedicine.    DATA  Interactive Complexity: No  Crisis: No        Progress Since Last Session (Related to Symptoms / Goals / Homework):   Symptoms: No change in symptoms reported.   Patient continues to report and exhibit: depression, adjustment disorder with anxious mood and ADHD related symptoms.     Homework: Partially completed      Episode of Care Goals: Satisfactory progress - ACTION (Actively working towards change); Intervened by reinforcing change plan / affirming steps taken     Current / Ongoing Stressors and Concerns:   The patient continues to present with depression and adjustment disorder with  anxious mood symptoms. The patient also continues to report and exhibit ADHD related symptoms. The patient identified the following stressors and concerns: patient is is preparing to start a new job where she has to start work between 8:00-9:00 AM, interpersonal stress/dating, difficulty organizing, difficulty completing tasks, and difficulty managing her time. The patient processed through her internal experiences related to: her ADDERRAL XR medication,  her progress, her jobs, her interpersonal relationships, and her recent interpersonal interactions, and dating.       Treatment Objective(s) Addressed in This Session:    identify triggers/ fears / thoughts that contribute to feeling anxious  Identify negative self-talk and behaviors: challenge core beliefs, myths, and actions     Intervention:   Client Centered   Psychodynamic  ACT  Integrative Psychotherapy  Motivational Interviewing   Validation and Normalization   Co-develop short-term goals and review progress in session.    Assessments completed prior to visit: None  The following assessments were completed by patient for this visit: None     ASSESSMENT: Current Emotional / Mental Status (status of significant symptoms):   Risk status (Self / Other harm or suicidal ideation)   Patient denies current fears or concerns for personal safety.   Patient denies current or recent suicidal ideation or behaviors.   Patient denies current or recent homicidal ideation or behaviors.   Patient denies current or recent self injurious behavior or ideation.   Patient denies other safety concerns.   Patient reports there has been no change in risk factors since their last session.     Patient reports there has been no change in protective factors since their last session.     Recommended that patient call 911 or go to the local ED should there be a change in any of these risk factors.     Appearance:   Appropriate    Eye Contact:   Good    Psychomotor Behavior: Normal     Attitude:   Cooperative  Interested Friendly Pleasant Attentive   Orientation:   All   Speech    Rate / Production: Normal/ Responsive    Volume:  Normal    Mood:    Normal   Affect:    Appropriate    Thought Content:  Clear    Thought Form:  Coherent  Logical    Insight:    Good      Medication Review:   Changes to psychiatric medications, see updated Medication List in EPIC.      Medication Compliance:   Yes     Changes in Health Issues:   None reported     Chemical Use Review:   Substance Use: Chemical use reviewed, no active concerns identified      Tobacco Use: Not addressed in visit.     Diagnosis:  1. Adjustment disorder with anxious mood    2. Major depressive disorder, recurrent episode, moderate (H)    3. ADHD (attention deficit hyperactivity disorder), inattentive type          Collateral Reports Completed:   Not Applicable    PLAN: (Patient Tasks / Therapist Tasks / Other  Patient plans to continue to organize her space.   Patient continue to maintain healthy interpersonal boundaries.   Patient plans to try to clean for 1 hours/day.   Patient plans to do her paperwork/prepare for taxes.   Patient plans to make a follow up appointment for her ADHD evaluation results.   Patient plans to return for follow up: 02/21/2024        Aidee Conti, [facility]                                                         ______________________________________________________________________    Individual Treatment Plan    Patient's Name: Dalia Hart  YOB: 1970    Date of Creation: 7/13/2023  Date Treatment Plan Last Reviewed/Revised: 1/18/2024    DSM5 Diagnoses:   1. Major depressive disorder, recurrent episode, moderate (H)    2. Adjustment disorder with anxious mood      Psychosocial / Contextual Factors: Patient identified the following stressors/concerns: financial, patient recently lost her job, and parenting stress. Patent report that she is not getting a lot of sleep and reports that she  does not always get regular sleep.   PROMIS (reviewed every 90 days): 23    Referral / Collaboration:  Referral to another professional/service is not indicated at this time..    Anticipated number of session for this episode of care: 9-12 sessions  Anticipation frequency of session: Weekly  Anticipated Duration of each session: 38-52 minutes  Treatment plan will be reviewed in 90 days or when goals have been changed.       MeasurableTreatment Goal(s) related to diagnosis / functional impairment(s)  Goal 1: Decrease severity of depression symptoms.       Objective #A (Patient Action)    Patient will Decrease frequency and intensity of feeling down, depressed, hopeless.  Status: New - Date: 7/13/2023  Continued dates: 10/19/2023, 1/18/2024    Intervention(s)  Client Centered  CBT  Psycho-dynamic  Internal Family Systems  Psycho-education    Objective #B  Patient will Improve quantity and quality of night time sleep / decrease daytime naps.  Status: New - Date: 7/13/2023  Continued dates: 10/19/2023, 1/18/2024    Intervention(s)  Client Centered  CBT  Internal Family Systems  Psycho-education  Mindulness    Objective #C  Patient will Identify negative self-talk and behaviors: challenge core beliefs, myths, and actions.  Status: New - Date: 7/13/2023  Continued dates: 10/19/2023, 1/18/2024    Intervention(s)  Client Centered  CBT  Internal Family Systems  Psycho-education      Goal 2: Decrease severity of anxiety related symptoms.       Objective #A (Patient Action)    Status: New - Date: 7/13/2023  Continued dates: 10/19/2023, 1/18/2024    Patient will identify triggers/ fears / thoughts that contribute to feeling anxious.    Intervention(s)  Psychodynamic  CBT  Internal Family Systems    Objective #B  Patient will use 1 or more coping skills for anxiety management each week.   Status: New - Date: 7/13/2023  Continued dates: 10/19/2023, 1/18/2024    Intervention(s)  Therapist will teach emotional regulation skills.  (mindfulness, breathing techniques, progressive muscle relaxation) .  Co-develop short-term goals and review progress in session.   CBT  Internal Family Systems  Mindfulness      Goal 3: Improve interpersonal relationships.       Objective #A (Patient Action)    Status: New - Date: 7/13/2023  Continued dates: 10/19/2023, 1/18/2024    Patient will compile a list of boundaries that they would like to set with others.    Intervention(s)  Client Centered  Motivational Interviewing  Psycho-education  Psychodynamic  Internal Family Systems.     Objective #B  Patient will practice setting boundaries 1 or more times each week.    Status: New - Date: 7/13/2023  Continued dates: 10/19/2023, 1/18/2024    Intervention(s)  Client Centered  Motivational Interviewing  Co-develop short-term goals and review progress in session.         Patient has reviewed and agreed to the above plan.    Aidee Conti, [facility] January 18, 2024

## 2024-02-27 ENCOUNTER — VIRTUAL VISIT (OUTPATIENT)
Dept: PSYCHOLOGY | Facility: CLINIC | Age: 54
End: 2024-02-27
Payer: COMMERCIAL

## 2024-02-27 DIAGNOSIS — F90.0 ADHD (ATTENTION DEFICIT HYPERACTIVITY DISORDER), INATTENTIVE TYPE: ICD-10-CM

## 2024-02-27 DIAGNOSIS — F33.1 MAJOR DEPRESSIVE DISORDER, RECURRENT EPISODE, MODERATE (H): ICD-10-CM

## 2024-02-27 DIAGNOSIS — F43.22 ADJUSTMENT DISORDER WITH ANXIOUS MOOD: Primary | ICD-10-CM

## 2024-02-27 PROCEDURE — 90834 PSYTX W PT 45 MINUTES: CPT | Mod: 95 | Performed by: SOCIAL WORKER

## 2024-02-27 NOTE — PROGRESS NOTES
"    [facility]                                     Progress Note    Patient Name: Dalia Hart  Date: 2024         Service Type: Individual      Session Start Time: 1:18 PM Session End Time: 1:56 PM     Session Length:   38 min   Session #: 24    Attendees: Client attended alone    Service Modality: Video Visit via [employer]      Provider verified identity through the following two step process.  Patient provided:  Patient  and Patient is known previously to provider     Telemedicine Visit: The patient's condition can be safely assessed and treated via synchronous audio and visual telemedicine encounter.      Reason for Telemedicine Visit: Patient convenience (e.g. access to timely appointments / distance to available provider)    Originating Site (Patient Location): Patient's home    Distant Site (Provider Location): [facility] [place]    Consent:  The patient/guardian has verbally consented to: the potential risks and benefits of telemedicine (video visit) versus in person care; bill my insurance or make self-payment for services provided; and responsibility for payment of non-covered services.   Distant Location (Provider):  On-site    As the provider I attest to compliance with applicable laws and regulations related to telemedicine.    DATA  Interactive Complexity: No  Crisis: No        Progress Since Last Session (Related to Symptoms / Goals / Homework):   Symptoms: No change in symptoms reported.   The patient reports feeling \"nervous\" because she has to \"get up and be some place every day.\" Patient continues to report and exhibit: depression, adjustment disorder with anxious mood and ADHD related symptoms.     Homework: Partially completed      Episode of Care Goals: Satisfactory progress - ACTION (Actively working towards change); Intervened by reinforcing change plan / affirming steps taken     Current / Ongoing Stressors and " Concerns:   The patient continues to present with depression and adjustment disorder with anxious mood symptoms. The patient also continues to report and exhibit ADHD related symptoms. The patient identified the following stressors and concerns: patient recently started a new job, parenting stress, and interpersonal stress. The patient processed through her internal experiences related to: her new job, her week, her parenting responsibilities, her parenting experiences, her recent interpersonal interactions, and her interpersonal relationships.        Treatment Objective(s) Addressed in This Session:   Therapist and patient continue to address the following treatment objectives:  -Patient will identify triggers/ fears / thoughts that contribute to feeling anxious  -Patient will use 1 or more coping skills for anxiety management each week.     Intervention:   Client Centered   Psychodynamic  ACT  Integrative Psychotherapy  Motivational Interviewing   Validation and Normalization   Co-develop short-term goals and review progress in session.    Assessments completed prior to visit: None  The following assessments were completed by patient for this visit: None     ASSESSMENT: Current Emotional / Mental Status (status of significant symptoms):   Risk status (Self / Other harm or suicidal ideation)   Patient denies current fears or concerns for personal safety.   Patient denies current or recent suicidal ideation or behaviors.   Patient denies current or recent homicidal ideation or behaviors.   Patient denies current or recent self injurious behavior or ideation.   Patient denies other safety concerns.   Patient reports there has been no change in risk factors since their last session.     Patient reports there has been no change in protective factors since their last session.     Recommended that patient call 911 or go to the local ED should there be a change in any of these risk factors.     Appearance:   Appropriate     Eye Contact:   Good    Psychomotor Behavior: Normal    Attitude:   Cooperative  Interested Friendly Pleasant Attentive   Orientation:   All   Speech    Rate / Production: Normal/ Responsive    Volume:  Normal    Mood:    Normal   Affect:    Appropriate    Thought Content:  Clear    Thought Form:  Coherent  Logical    Insight:    Good      Medication Review:   No changes to current psychiatric medication(s)     Medication Compliance:   Yes     Changes in Health Issues:   None reported     Chemical Use Review:   Substance Use: Chemical use reviewed, no active concerns identified      Tobacco Use: Not addressed in visit.     Diagnosis:  1. Adjustment disorder with anxious mood    2. Major depressive disorder, recurrent episode, moderate (H)    3. ADHD (attention deficit hyperactivity disorder), inattentive type        Collateral Reports Completed:   Not Applicable    PLAN: (Patient Tasks / Therapist Tasks / Other  Patient plans to check-in with her energy and re-center herself as needed using her breath.   Patient plans to continue to organize her space.   Patient plans to make a follow up appointment for her ADHD evaluation results.   Patient plans to return for follow up: 03/07/2024        Aidee Conti, [facility]                                                         ______________________________________________________________________    Individual Treatment Plan    Patient's Name: Dalia Hart  YOB: 1970    Date of Creation: 7/13/2023  Date Treatment Plan Last Reviewed/Revised: 1/18/2024    DSM5 Diagnoses:   1. Major depressive disorder, recurrent episode, moderate (H)    2. Adjustment disorder with anxious mood      Psychosocial / Contextual Factors: Patient identified the following stressors/concerns: financial, patient recently lost her job, and parenting stress. Patent report that she is not getting a lot of sleep and reports that she does not always get regular sleep.   REE  (reviewed every 90 days): 23    Referral / Collaboration:  Referral to another professional/service is not indicated at this time..    Anticipated number of session for this episode of care: 9-12 sessions  Anticipation frequency of session: Weekly  Anticipated Duration of each session: 38-52 minutes  Treatment plan will be reviewed in 90 days or when goals have been changed.       MeasurableTreatment Goal(s) related to diagnosis / functional impairment(s)  Goal 1: Decrease severity of depression symptoms.       Objective #A (Patient Action)    Patient will Decrease frequency and intensity of feeling down, depressed, hopeless.  Status: New - Date: 7/13/2023  Continued dates: 10/19/2023, 1/18/2024    Intervention(s)  Client Centered  CBT  Psycho-dynamic  Internal Family Systems  Psycho-education    Objective #B  Patient will Improve quantity and quality of night time sleep / decrease daytime naps.  Status: New - Date: 7/13/2023  Continued dates: 10/19/2023, 1/18/2024    Intervention(s)  Client Centered  CBT  Internal Family Systems  Psycho-education  Mindulness    Objective #C  Patient will Identify negative self-talk and behaviors: challenge core beliefs, myths, and actions.  Status: New - Date: 7/13/2023  Continued dates: 10/19/2023, 1/18/2024    Intervention(s)  Client Centered  CBT  Internal Family Systems  Psycho-education      Goal 2: Decrease severity of anxiety related symptoms.       Objective #A (Patient Action)    Status: New - Date: 7/13/2023  Continued dates: 10/19/2023, 1/18/2024    Patient will identify triggers/ fears / thoughts that contribute to feeling anxious.    Intervention(s)  Psychodynamic  CBT  Internal Family Systems    Objective #B  Patient will use 1 or more coping skills for anxiety management each week.   Status: New - Date: 7/13/2023  Continued dates: 10/19/2023, 1/18/2024    Intervention(s)  Therapist will teach emotional regulation skills. (mindfulness, breathing techniques,  progressive muscle relaxation) .  Co-develop short-term goals and review progress in session.   CBT  Internal Family Systems  Mindfulness      Goal 3: Improve interpersonal relationships.       Objective #A (Patient Action)    Status: New - Date: 7/13/2023  Continued dates: 10/19/2023, 1/18/2024    Patient will compile a list of boundaries that they would like to set with others.    Intervention(s)  Client Centered  Motivational Interviewing  Psycho-education  Psychodynamic  Internal Family Systems.     Objective #B  Patient will practice setting boundaries 1 or more times each week.    Status: New - Date: 7/13/2023  Continued dates: 10/19/2023, 1/18/2024    Intervention(s)  Client Centered  Motivational Interviewing  Co-develop short-term goals and review progress in session.         Patient has reviewed and agreed to the above plan.    Aidee Conti, [facility] January 18, 2024

## 2024-03-07 ENCOUNTER — VIRTUAL VISIT (OUTPATIENT)
Dept: PSYCHOLOGY | Facility: CLINIC | Age: 54
End: 2024-03-07
Payer: COMMERCIAL

## 2024-03-07 DIAGNOSIS — F43.22 ADJUSTMENT DISORDER WITH ANXIOUS MOOD: Primary | ICD-10-CM

## 2024-03-07 DIAGNOSIS — F33.1 MAJOR DEPRESSIVE DISORDER, RECURRENT EPISODE, MODERATE (H): ICD-10-CM

## 2024-03-07 DIAGNOSIS — F90.0 ADHD (ATTENTION DEFICIT HYPERACTIVITY DISORDER), INATTENTIVE TYPE: ICD-10-CM

## 2024-03-07 PROCEDURE — 90834 PSYTX W PT 45 MINUTES: CPT | Mod: 95 | Performed by: SOCIAL WORKER

## 2024-03-07 NOTE — PROGRESS NOTES
"    [facility]                                     Progress Note    Patient Name: Dalia Hart  Date: 2024         Service Type: Individual      Session Start Time: 1:05 PM Session End Time: 1:50 PM     Session Length:   45 min   Session #: 25    Attendees: Client attended alone    Service Modality: Video Visit via stickK      Provider verified identity through the following two step process.  Patient provided:  Patient  and Patient is known previously to provider     Telemedicine Visit: The patient's condition can be safely assessed and treated via synchronous audio and visual telemedicine encounter.      Reason for Telemedicine Visit: Patient convenience (e.g. access to timely appointments / distance to available provider)    Originating Site (Patient Location): Patient's home    Distant Site (Provider Location): [facility] [place]    Consent:  The patient/guardian has verbally consented to: the potential risks and benefits of telemedicine (video visit) versus in person care; bill my insurance or make self-payment for services provided; and responsibility for payment of non-covered services.   Distant Location (Provider):  On-site    As the provider I attest to compliance with applicable laws and regulations related to telemedicine.    DATA  Interactive Complexity: No  Crisis: No        Progress Since Last Session (Related to Symptoms / Goals / Homework):   Symptoms: No change in symptoms reported.   Patient continues to report and exhibit: depression, adjustment disorder with anxious mood and ADHD related symptoms. The patient reports that she has been doing a lot of processing lately about her past relationship and childhood. The patient reports that she has been feeling \"too tired\".     Homework: Partially completed      Episode of Care Goals: Satisfactory progress - ACTION (Actively working towards change); Intervened by " reinforcing change plan / affirming steps taken     Current / Ongoing Stressors and Concerns:   The patient continues to report and present with: ADHD, depression, adjustment disorder with anxious mood symptoms. The patient identified the following stressors and concerns: patient has a bad tooth ache, patient just started a new job, patient reports that she is adjusting to having get up early and going to the same place every day to work, the patient is trying to get all of her job training done before she goes out of town, and interpersonal stress. The patient processed through her internal experiences related to: her new job, her interpersonal relationships, and her current lifestyle. Patient and provider discussed the benefits of getting adequate sleep.      Treatment Objective(s) Addressed in This Session:   Improve quantity and quality of night time sleep / decrease daytime naps  Therapist and patient continue to address the following treatment objectives:  -Patient will identify triggers/ fears / thoughts that contribute to feeling anxious       Intervention:   Client Centered   Psychodynamic  ACT  Motivational Interviewing   Validation and Normalization   Co-develop short-term goals and review progress in session.    Assessments completed prior to visit: None  The following assessments were completed by patient for this visit: None     ASSESSMENT: Current Emotional / Mental Status (status of significant symptoms):   Risk status (Self / Other harm or suicidal ideation)   Patient denies current fears or concerns for personal safety.   Patient denies current or recent suicidal ideation or behaviors.   Patient denies current or recent homicidal ideation or behaviors.   Patient denies current or recent self injurious behavior or ideation.   Patient denies other safety concerns.   Patient reports there has been no change in risk factors since their last session.     Patient reports there has been no change in  protective factors since their last session.     Recommended that patient call 911 or go to the local ED should there be a change in any of these risk factors.     Appearance:   Appropriate    Eye Contact:   Good    Psychomotor Behavior: Normal    Attitude:   Cooperative  Interested Friendly Pleasant Attentive   Orientation:   All   Speech    Rate / Production: Normal/ Responsive    Volume:  Normal    Mood:    Normal Tired   Affect:    Appropriate    Thought Content:  Clear    Thought Form:  Coherent  Logical    Insight:    Good      Medication Review:   No changes to current psychiatric medication(s)     Medication Compliance:   Yes     Changes in Health Issues:   Yes: patient reports having a tooth ache     Chemical Use Review:   Substance Use: Chemical use reviewed, no active concerns identified      Tobacco Use: Not addressed in visit.     Diagnosis:  1. Adjustment disorder with anxious mood    2. Major depressive disorder, recurrent episode, moderate (H)    3. ADHD (attention deficit hyperactivity disorder), inattentive type          Collateral Reports Completed:   Not Applicable    PLAN: (Patient Tasks / Therapist Tasks / Other  Patient plans to be patient with herself as she adjusts to her new job and new work schedule.   Patient plans to continue to organize her space.   Patient plans to return for follow up: 03/28/2024        Aidee Conti, [facility]                                                         ______________________________________________________________________    Individual Treatment Plan    Patient's Name: Dalia Hart  YOB: 1970    Date of Creation: 7/13/2023  Date Treatment Plan Last Reviewed/Revised: 1/18/2024    DSM5 Diagnoses:   1. Major depressive disorder, recurrent episode, moderate (H)    2. Adjustment disorder with anxious mood      Psychosocial / Contextual Factors: Patient identified the following stressors/concerns: financial, patient recently lost her job,  and parenting stress. Patent report that she is not getting a lot of sleep and reports that she does not always get regular sleep.   PROMIS (reviewed every 90 days): 23    Referral / Collaboration:  Referral to another professional/service is not indicated at this time..    Anticipated number of session for this episode of care: 9-12 sessions  Anticipation frequency of session: Weekly  Anticipated Duration of each session: 38-52 minutes  Treatment plan will be reviewed in 90 days or when goals have been changed.       MeasurableTreatment Goal(s) related to diagnosis / functional impairment(s)  Goal 1: Decrease severity of depression symptoms.       Objective #A (Patient Action)    Patient will Decrease frequency and intensity of feeling down, depressed, hopeless.  Status: New - Date: 7/13/2023  Continued dates: 10/19/2023, 1/18/2024    Intervention(s)  Client Centered  CBT  Psycho-dynamic  Internal Family Systems  Psycho-education    Objective #B  Patient will Improve quantity and quality of night time sleep / decrease daytime naps.  Status: New - Date: 7/13/2023  Continued dates: 10/19/2023, 1/18/2024    Intervention(s)  Client Centered  CBT  Internal Family Systems  Psycho-education  Mindulness    Objective #C  Patient will Identify negative self-talk and behaviors: challenge core beliefs, myths, and actions.  Status: New - Date: 7/13/2023  Continued dates: 10/19/2023, 1/18/2024    Intervention(s)  Client Centered  CBT  Internal Family Systems  Psycho-education      Goal 2: Decrease severity of anxiety related symptoms.       Objective #A (Patient Action)    Status: New - Date: 7/13/2023  Continued dates: 10/19/2023, 1/18/2024    Patient will identify triggers/ fears / thoughts that contribute to feeling anxious.    Intervention(s)  Psychodynamic  CBT  Internal Family Systems    Objective #B  Patient will use 1 or more coping skills for anxiety management each week.   Status: New - Date: 7/13/2023  Continued  dates: 10/19/2023, 1/18/2024    Intervention(s)  Therapist will teach emotional regulation skills. (mindfulness, breathing techniques, progressive muscle relaxation) .  Co-develop short-term goals and review progress in session.   CBT  Internal Family Systems  Mindfulness      Goal 3: Improve interpersonal relationships.       Objective #A (Patient Action)    Status: New - Date: 7/13/2023  Continued dates: 10/19/2023, 1/18/2024    Patient will compile a list of boundaries that they would like to set with others.    Intervention(s)  Client Centered  Motivational Interviewing  Psycho-education  Psychodynamic  Internal Family Systems.     Objective #B  Patient will practice setting boundaries 1 or more times each week.    Status: New - Date: 7/13/2023  Continued dates: 10/19/2023, 1/18/2024    Intervention(s)  Client Centered  Motivational Interviewing  Co-develop short-term goals and review progress in session.         Patient has reviewed and agreed to the above plan.    Aidee Conti, [facility] January 18, 2024

## 2024-03-10 SDOH — HEALTH STABILITY: PHYSICAL HEALTH: ON AVERAGE, HOW MANY MINUTES DO YOU ENGAGE IN EXERCISE AT THIS LEVEL?: 90 MIN

## 2024-03-10 SDOH — HEALTH STABILITY: PHYSICAL HEALTH: ON AVERAGE, HOW MANY DAYS PER WEEK DO YOU ENGAGE IN MODERATE TO STRENUOUS EXERCISE (LIKE A BRISK WALK)?: 2 DAYS

## 2024-03-10 ASSESSMENT — ANXIETY QUESTIONNAIRES
6. BECOMING EASILY ANNOYED OR IRRITABLE: SEVERAL DAYS
2. NOT BEING ABLE TO STOP OR CONTROL WORRYING: NOT AT ALL
1. FEELING NERVOUS, ANXIOUS, OR ON EDGE: NOT AT ALL
IF YOU CHECKED OFF ANY PROBLEMS ON THIS QUESTIONNAIRE, HOW DIFFICULT HAVE THESE PROBLEMS MADE IT FOR YOU TO DO YOUR WORK, TAKE CARE OF THINGS AT HOME, OR GET ALONG WITH OTHER PEOPLE: SOMEWHAT DIFFICULT
3. WORRYING TOO MUCH ABOUT DIFFERENT THINGS: NOT AT ALL
GAD7 TOTAL SCORE: 2
5. BEING SO RESTLESS THAT IT IS HARD TO SIT STILL: NOT AT ALL
4. TROUBLE RELAXING: SEVERAL DAYS
7. FEELING AFRAID AS IF SOMETHING AWFUL MIGHT HAPPEN: NOT AT ALL

## 2024-03-10 ASSESSMENT — SOCIAL DETERMINANTS OF HEALTH (SDOH): HOW OFTEN DO YOU GET TOGETHER WITH FRIENDS OR RELATIVES?: MORE THAN THREE TIMES A WEEK

## 2024-03-10 ASSESSMENT — PATIENT HEALTH QUESTIONNAIRE - PHQ9: SUM OF ALL RESPONSES TO PHQ QUESTIONS 1-9: 4

## 2024-03-28 ENCOUNTER — VIRTUAL VISIT (OUTPATIENT)
Dept: PSYCHOLOGY | Facility: CLINIC | Age: 54
End: 2024-03-28
Payer: COMMERCIAL

## 2024-03-28 DIAGNOSIS — F33.1 MAJOR DEPRESSIVE DISORDER, RECURRENT EPISODE, MODERATE (H): ICD-10-CM

## 2024-03-28 DIAGNOSIS — F90.0 ADHD (ATTENTION DEFICIT HYPERACTIVITY DISORDER), INATTENTIVE TYPE: ICD-10-CM

## 2024-03-28 DIAGNOSIS — F43.22 ADJUSTMENT DISORDER WITH ANXIOUS MOOD: Primary | ICD-10-CM

## 2024-03-28 PROCEDURE — 90834 PSYTX W PT 45 MINUTES: CPT | Mod: 95 | Performed by: SOCIAL WORKER

## 2024-03-28 NOTE — PROGRESS NOTES
[facility]                                     Progress Note    Patient Name: Dalia Hart  Date: 2024       Service Type: Individual      Session Start Time: 1:11 PM Session End Time: 1:59 PM     Session Length:   48 min     Session #: 25    Attendees: Client attended alone    Service Modality: Video Visit via QVIVO      Provider verified identity through the following two step process.  Patient provided:  Patient  and Patient is known previously to provider     Telemedicine Visit: The patient's condition can be safely assessed and treated via synchronous audio and visual telemedicine encounter.      Reason for Telemedicine Visit: Patient convenience (e.g. access to timely appointments / distance to available provider)    Originating Site (Patient Location): Patient's place of employment    Distant Site (Provider Location): [facility] [place]    Consent:  The patient/guardian has verbally consented to: the potential risks and benefits of telemedicine (video visit) versus in person care; bill my insurance or make self-payment for services provided; and responsibility for payment of non-covered services.   Distant Location (Provider):  On-site    As the provider I attest to compliance with applicable laws and regulations related to telemedicine.    DATA  Interactive Complexity: No  Crisis: No        Progress Since Last Session (Related to Symptoms / Goals / Homework):   Symptoms: No change in symptoms reported.   Patient continues to report and exhibit: depression, adjustment disorder with anxious mood and ADHD related symptoms.     Homework: Partially completed      Episode of Care Goals: Satisfactory progress - ACTION (Actively working towards change); Intervened by reinforcing change plan / affirming steps taken     Current / Ongoing Stressors and Concerns:   The patient continues to report and present with: ADHD, depression,  "adjustment disorder with anxious mood symptoms. The patient identified the following stressors and concerns: parenting stress, patient is only getting 4.5 hours of sleep according to her apple watch, and patient is still adjusting to her new work schedule. The patient states: \"things are actually going really good.\" The patient reports that she is enjoying her new  job.  The patient processed through her internal experiences related to: her recent parenting experiences, her interpersonal relationships, her recent interpersonal interactions, and her new job. Patient and provider discussed the benefits of getting adequate sleep.      Treatment Objective(s) Addressed in This Session:   Improve quantity and quality of night time sleep / decrease daytime naps  Decrease frequency and intensity of feeling down, depressed, hopeless  Patient will practice setting boundaries 1 or more times each week.     Intervention:   Client Centered   Psychodynamic  ACT  Motivational Interviewing   Validation and Normalization   Co-develop short-term goals and review progress in session.    Assessments completed prior to visit: None  The following assessments were completed by patient for this visit: None     ASSESSMENT: Current Emotional / Mental Status (status of significant symptoms):   Risk status (Self / Other harm or suicidal ideation)   Patient denies current fears or concerns for personal safety.   Patient denies current or recent suicidal ideation or behaviors.   Patient denies current or recent homicidal ideation or behaviors.   Patient denies current or recent self injurious behavior or ideation.   Patient denies other safety concerns.   Patient reports there has been no change in risk factors since their last session.     Patient reports there has been no change in protective factors since their last session.     Recommended that patient call 911 or go to the local ED should there be a change in any of these risk " factors.     Appearance:   Appropriate    Eye Contact:   Good    Psychomotor Behavior: Normal    Attitude:   Cooperative  Interested Friendly Pleasant Attentive   Orientation:   All   Speech    Rate / Production: Normal/ Responsive    Volume:  Normal    Mood:    Normal   Affect:    Appropriate    Thought Content:  Clear    Thought Form:  Coherent  Logical    Insight:    Good      Medication Review:   No changes to current psychiatric medication(s)     Medication Compliance:   Yes     Changes in Health Issues:   None reported     Chemical Use Review:   Substance Use: Chemical use reviewed, no active concerns identified      Tobacco Use: Not addressed in visit.     Diagnosis:  1. Adjustment disorder with anxious mood    2. Major depressive disorder, recurrent episode, moderate (H)    3. ADHD (attention deficit hyperactivity disorder), inattentive type      Collateral Reports Completed:   Not Applicable    PLAN: (Patient Tasks / Therapist Tasks / Other  Patient plans to go to the spa.   Patient plans to continue to organize her space.   Patient plans to return for follow up: 04/03/2024      Aidee Conti, [facility]                                                      ______________________________________________________________________    Individual Treatment Plan    Patient's Name: Dalia Hart  YOB: 1970    Date of Creation: 7/13/2023  Date Treatment Plan Last Reviewed/Revised: 1/18/2024    DSM5 Diagnoses:   1. Major depressive disorder, recurrent episode, moderate (H)    2. Adjustment disorder with anxious mood      Psychosocial / Contextual Factors: Patient identified the following stressors/concerns: financial, patient recently lost her job, and parenting stress. Patent report that she is not getting a lot of sleep and reports that she does not always get regular sleep.   PROMIS (reviewed every 90 days): 24    Referral / Collaboration:  Referral to another professional/service is not indicated  at this time..    Anticipated number of session for this episode of care: 9-12 sessions  Anticipation frequency of session: Weekly  Anticipated Duration of each session: 38-52 minutes  Treatment plan will be reviewed in 90 days or when goals have been changed.       MeasurableTreatment Goal(s) related to diagnosis / functional impairment(s)  Goal 1: Decrease severity of depression symptoms.       Objective #A (Patient Action)    Patient will Decrease frequency and intensity of feeling down, depressed, hopeless.  Status: New - Date: 7/13/2023  Continued dates: 10/19/2023, 1/18/2024    Intervention(s)  Client Centered  CBT  Psycho-dynamic  Internal Family Systems  Psycho-education    Objective #B  Patient will Improve quantity and quality of night time sleep / decrease daytime naps.  Status: New - Date: 7/13/2023  Continued dates: 10/19/2023, 1/18/2024    Intervention(s)  Client Centered  CBT  Internal Family Systems  Psycho-education  Mindulness    Objective #C  Patient will Identify negative self-talk and behaviors: challenge core beliefs, myths, and actions.  Status: New - Date: 7/13/2023  Continued dates: 10/19/2023, 1/18/2024    Intervention(s)  Client Centered  CBT  Internal Family Systems  Psycho-education      Goal 2: Decrease severity of anxiety related symptoms.       Objective #A (Patient Action)    Status: New - Date: 7/13/2023  Continued dates: 10/19/2023, 1/18/2024    Patient will identify triggers/ fears / thoughts that contribute to feeling anxious.    Intervention(s)  Psychodynamic  CBT  Internal Family Systems    Objective #B  Patient will use 1 or more coping skills for anxiety management each week.   Status: New - Date: 7/13/2023  Continued dates: 10/19/2023, 1/18/2024    Intervention(s)  Therapist will teach emotional regulation skills. (mindfulness, breathing techniques, progressive muscle relaxation) .  Co-develop short-term goals and review progress in session.   CBT  Internal Family  Systems  Mindfulness      Goal 3: Improve interpersonal relationships.       Objective #A (Patient Action)    Status: New - Date: 7/13/2023  Continued dates: 10/19/2023, 1/18/2024    Patient will compile a list of boundaries that they would like to set with others.    Intervention(s)  Client Centered  Motivational Interviewing  Psycho-education  Psychodynamic  Internal Family Systems.     Objective #B  Patient will practice setting boundaries 1 or more times each week.    Status: New - Date: 7/13/2023  Continued dates: 10/19/2023, 1/18/2024    Intervention(s)  Client Centered  Motivational Interviewing  Co-develop short-term goals and review progress in session.         Patient has reviewed and agreed to the above plan.    Aidee Conti, [facility] January 18, 2024

## 2024-04-03 ENCOUNTER — VIRTUAL VISIT (OUTPATIENT)
Dept: PSYCHOLOGY | Facility: CLINIC | Age: 54
End: 2024-04-03
Payer: COMMERCIAL

## 2024-04-03 DIAGNOSIS — F90.0 ADHD (ATTENTION DEFICIT HYPERACTIVITY DISORDER), INATTENTIVE TYPE: Primary | ICD-10-CM

## 2024-04-03 DIAGNOSIS — F43.22 ADJUSTMENT DISORDER WITH ANXIOUS MOOD: ICD-10-CM

## 2024-04-03 DIAGNOSIS — F33.1 MAJOR DEPRESSIVE DISORDER, RECURRENT EPISODE, MODERATE (H): ICD-10-CM

## 2024-04-03 PROCEDURE — 90832 PSYTX W PT 30 MINUTES: CPT | Mod: 95 | Performed by: SOCIAL WORKER

## 2024-04-03 NOTE — PROGRESS NOTES
[facility]                                     Progress Note    Patient Name: Dalia Hart  Date: April 3, 2024         Service Type: Individual      Session Start Time: 8:39 AM Session End Time: 9:06 AM     Session Length:  27  min     Session #: 26    Attendees: Client attended alone    Service Modality: Video Visit via Amwell      Provider verified identity through the following two step process.  Patient provided:  Patient  and Patient is known previously to provider     Telemedicine Visit: The patient's condition can be safely assessed and treated via synchronous audio and visual telemedicine encounter.      Reason for Telemedicine Visit: Patient convenience (e.g. access to timely appointments / distance to available provider)    Originating Site (Patient Location): Patient's place of employment    Distant Site (Provider Location): [facility] [place]    Consent:  The patient/guardian has verbally consented to: the potential risks and benefits of telemedicine (video visit) versus in person care; bill my insurance or make self-payment for services provided; and responsibility for payment of non-covered services.   Distant Location (Provider):  On-site    As the provider I attest to compliance with applicable laws and regulations related to telemedicine.    DATA  Interactive Complexity: No  Crisis: No        Progress Since Last Session (Related to Symptoms / Goals / Homework):   Symptoms: Improving - patient reports that she has been feeling happier overall and reports that she has been maintaining healthy boundaries for herself.  Patient presented with ADHD related symptoms.     Homework: Partially completed      Episode of Care Goals: Satisfactory progress - ACTION (Actively working towards change); Intervened by reinforcing change plan / affirming steps taken     Current / Ongoing Stressors and Concerns:   The patient continues to report  and present with: ADHD, depression, adjustment disorder with anxious mood symptoms. The patient identified the following stressors and concerns: concerned about her taxes and dating. The patient reports that she is feeling happier. The patient processed through her internal experiences related to: her job, dating, her interpersonal relationships, and her mindset. Patient and provider discussed the benefits of establishing a consistent sleep schedule.      Treatment Objective(s) Addressed in This Session:   Identify negative self-talk and behaviors: challenge core beliefs, myths, and actions  Continued to address:  Improve quantity and quality of night time sleep / decrease daytime naps  Decrease frequency and intensity of feeling down, depressed, hopeless  Patient will practice setting boundaries 1 or more times each week.     Intervention:   Client Centered   Psychodynamic  ACT  Motivational Interviewing   Validation and Normalization   Co-develop short-term goals and review progress in session.    Assessments completed prior to visit: None  The following assessments were completed by patient for this visit: None     ASSESSMENT: Current Emotional / Mental Status (status of significant symptoms):   Risk status (Self / Other harm or suicidal ideation)   Patient denies current fears or concerns for personal safety.   Patient denies current or recent suicidal ideation or behaviors.   Patient denies current or recent homicidal ideation or behaviors.   Patient denies current or recent self injurious behavior or ideation.   Patient denies other safety concerns.   Patient reports there has been no change in risk factors since their last session.     Patient reports there has been no change in protective factors since their last session.     Recommended that patient call 911 or go to the local ED should there be a change in any of these risk factors.     Appearance:   Appropriate    Eye Contact:   Good    Psychomotor  Behavior: Normal    Attitude:   Cooperative  Interested Friendly Pleasant Attentive   Orientation:   All   Speech    Rate / Production: Normal/ Responsive    Volume:  Normal    Mood:    Normal Happy   Affect:    Appropriate  Bright    Thought Content:  Clear    Thought Form:  Coherent  Logical    Insight:    Good      Medication Review:   No changes to current psychiatric medication(s)     Medication Compliance:   Yes     Changes in Health Issues:   None reported     Chemical Use Review:   Substance Use: Chemical use reviewed, no active concerns identified      Tobacco Use: Not addressed in visit.     Diagnosis:  1. ADHD (attention deficit hyperactivity disorder), inattentive type    2. Adjustment disorder with anxious mood    3. Major depressive disorder, recurrent episode, moderate (H)        Collateral Reports Completed:   Not Applicable    PLAN: (Patient Tasks / Therapist Tasks / Other  Patient plans to focus on establishing a regular sleep schedule.   Patient plans to continue to organize her space.   Patient plans to return for follow up: 04/24/2024    Next session: update treatment plan      Aidee Conti, [facility]                                                      ______________________________________________________________________    Individual Treatment Plan    Patient's Name: Dalia Hart  YOB: 1970    Date of Creation: 7/13/2023  Date Treatment Plan Last Reviewed/Revised: 1/18/2024    DSM5 Diagnoses:   1. Major depressive disorder, recurrent episode, moderate (H)    2. Adjustment disorder with anxious mood      Psychosocial / Contextual Factors: Patient identified the following stressors/concerns: financial, patient recently lost her job, and parenting stress. Patent report that she is not getting a lot of sleep and reports that she does not always get regular sleep.   PROMIS (reviewed every 90 days): 24    Referral / Collaboration:  Referral to another professional/service is  not indicated at this time..    Anticipated number of session for this episode of care: 9-12 sessions  Anticipation frequency of session: Weekly  Anticipated Duration of each session: 38-52 minutes  Treatment plan will be reviewed in 90 days or when goals have been changed.       MeasurableTreatment Goal(s) related to diagnosis / functional impairment(s)  Goal 1: Decrease severity of depression symptoms.       Objective #A (Patient Action)    Patient will Decrease frequency and intensity of feeling down, depressed, hopeless.  Status: New - Date: 7/13/2023  Continued dates: 10/19/2023, 1/18/2024    Intervention(s)  Client Centered  CBT  Psycho-dynamic  Internal Family Systems  Psycho-education    Objective #B  Patient will Improve quantity and quality of night time sleep / decrease daytime naps.  Status: New - Date: 7/13/2023  Continued dates: 10/19/2023, 1/18/2024    Intervention(s)  Client Centered  CBT  Internal Family Systems  Psycho-education  Mindulness    Objective #C  Patient will Identify negative self-talk and behaviors: challenge core beliefs, myths, and actions.  Status: New - Date: 7/13/2023  Continued dates: 10/19/2023, 1/18/2024    Intervention(s)  Client Centered  CBT  Internal Family Systems  Psycho-education      Goal 2: Decrease severity of anxiety related symptoms.       Objective #A (Patient Action)    Status: New - Date: 7/13/2023  Continued dates: 10/19/2023, 1/18/2024    Patient will identify triggers/ fears / thoughts that contribute to feeling anxious.    Intervention(s)  Psychodynamic  CBT  Internal Family Systems    Objective #B  Patient will use 1 or more coping skills for anxiety management each week.   Status: New - Date: 7/13/2023  Continued dates: 10/19/2023, 1/18/2024    Intervention(s)  Therapist will teach emotional regulation skills. (mindfulness, breathing techniques, progressive muscle relaxation) .  Co-develop short-term goals and review progress in session.   CBT  Internal  Family Systems  Mindfulness      Goal 3: Improve interpersonal relationships.       Objective #A (Patient Action)    Status: New - Date: 7/13/2023  Continued dates: 10/19/2023, 1/18/2024    Patient will compile a list of boundaries that they would like to set with others.    Intervention(s)  Client Centered  Motivational Interviewing  Psycho-education  Psychodynamic  Internal Family Systems.     Objective #B  Patient will practice setting boundaries 1 or more times each week.    Status: New - Date: 7/13/2023  Continued dates: 10/19/2023, 1/18/2024    Intervention(s)  Client Centered  Motivational Interviewing  Co-develop short-term goals and review progress in session.         Patient has reviewed and agreed to the above plan.    Aidee Conti, [facility] January 18, 2024

## 2024-04-24 ENCOUNTER — VIRTUAL VISIT (OUTPATIENT)
Dept: PSYCHOLOGY | Facility: CLINIC | Age: 54
End: 2024-04-24
Payer: COMMERCIAL

## 2024-04-24 DIAGNOSIS — F90.0 ADHD (ATTENTION DEFICIT HYPERACTIVITY DISORDER), INATTENTIVE TYPE: Primary | ICD-10-CM

## 2024-04-24 DIAGNOSIS — F43.22 ADJUSTMENT DISORDER WITH ANXIOUS MOOD: ICD-10-CM

## 2024-04-24 DIAGNOSIS — F33.1 MAJOR DEPRESSIVE DISORDER, RECURRENT EPISODE, MODERATE (H): ICD-10-CM

## 2024-04-24 PROCEDURE — 90832 PSYTX W PT 30 MINUTES: CPT | Mod: 95 | Performed by: SOCIAL WORKER

## 2024-04-24 ASSESSMENT — COLUMBIA-SUICIDE SEVERITY RATING SCALE - C-SSRS
6. HAVE YOU EVER DONE ANYTHING, STARTED TO DO ANYTHING, OR PREPARED TO DO ANYTHING TO END YOUR LIFE?: NO
1. SINCE LAST CONTACT, HAVE YOU WISHED YOU WERE DEAD OR WISHED YOU COULD GO TO SLEEP AND NOT WAKE UP?: NO
2. HAVE YOU ACTUALLY HAD ANY THOUGHTS OF KILLING YOURSELF?: NO

## 2024-04-24 NOTE — PROGRESS NOTES
[facility]                                     Progress Note    Patient Name: Dalia Hart  Date: 2024       Service Type: Individual      Session Start Time: 10:41 AM Session End Time: 11:13 AM     Session Length: 32 min     Session #: 27    Attendees: Client attended alone    Service Modality: Video Visit via Amwell      Provider verified identity through the following two step process.  Patient provided:  Patient  and Patient is known previously to provider     Telemedicine Visit: The patient's condition can be safely assessed and treated via synchronous audio and visual telemedicine encounter.      Reason for Telemedicine Visit: Patient convenience (e.g. access to timely appointments / distance to available provider)    Originating Site (Patient Location): Patient's place of employment    Distant Site (Provider Location): [facility] [place]    Consent:  The patient/guardian has verbally consented to: the potential risks and benefits of telemedicine (video visit) versus in person care; bill my insurance or make self-payment for services provided; and responsibility for payment of non-covered services.   Distant Location (Provider):  On-site    As the provider I attest to compliance with applicable laws and regulations related to telemedicine.    DATA  Interactive Complexity: No  Crisis: No        Progress Since Last Session (Related to Symptoms / Goals / Homework):   Symptoms: No change in symptoms reported.  Patient continues to presented with ADHD and adjustment disorder related symptoms.     Homework: Achieved / completed to satisfaction      Episode of Care Goals: Satisfactory progress - ACTION (Actively working towards change); Intervened by reinforcing change plan / affirming steps taken     Current / Ongoing Stressors and Concerns:   The patient continues to report and present with: ADHD, depression, adjustment disorder  with anxious mood symptoms. The patient identified the following stressors and concerns: patient reports that she is still adjusting to having a job with regular consistent hours, patient reports that she does not want to work, difficulty being around other people's energies (some days), difficulty concentrating/focusing, increased pressure at work, and parenting stress. The patient processed through her internal experiences related to: her job, her recent interpersonal interactions, and her interpersonal relationships. Patient and provider continue to discussed the benefits of establishing a consistent sleep schedule and the benefits of getting adequate sleep.      Treatment Objective(s) Addressed in This Session:   Continued to address:  Improve quantity and quality of night time sleep / decrease daytime naps  Decrease frequency and intensity of feeling down, depressed, hopeless  Patient will practice setting boundaries 1 or more times each week.     Intervention:   Client Centered   Psychodynamic  Motivational Interviewing   Validation and Normalization   Co-develop short-term goals and review progress in session.    Assessments completed prior to visit: None  The following assessments were completed by patient for this visit: Jenners Short     ASSESSMENT: Current Emotional / Mental Status (status of significant symptoms):   Risk status (Self / Other harm or suicidal ideation)   Patient denies current fears or concerns for personal safety.   Patient denies current or recent suicidal ideation or behaviors.   Patient denies current or recent homicidal ideation or behaviors.   Patient denies current or recent self injurious behavior or ideation.   Patient denies other safety concerns.   Patient reports there has been no change in risk factors since their last session.     Patient reports there has been no change in protective factors since their last session.     Recommended that patient call 911 or go to the local  ED should there be a change in any of these risk factors.     Appearance:   Appropriate    Eye Contact:   Good    Psychomotor Behavior: Normal    Attitude:   Cooperative  Interested Friendly Pleasant Attentive   Orientation:   All   Speech    Rate / Production: Normal/ Responsive    Volume:  Normal    Mood:    Normal   Affect:    Appropriate  Tired    Thought Content:  Clear    Thought Form:  Coherent  Logical    Insight:    Good      Medication Review:   No changes to current psychiatric medication(s)     Medication Compliance:   Yes     Changes in Health Issues:   None reported     Chemical Use Review:   Substance Use: Chemical use reviewed, no active concerns identified      Tobacco Use: Not addressed in visit.     Diagnosis:  1. ADHD (attention deficit hyperactivity disorder), inattentive type    2. Adjustment disorder with anxious mood    3. Major depressive disorder, recurrent episode, moderate (H)          Collateral Reports Completed:   Not Applicable    PLAN: (Patient Tasks / Therapist Tasks / Other  Patient plans to continue to focus on establishing a regular sleep schedule.   Patient plans to continue to organize her space.   Patient plans to establish household expectations with her daughters.   Patient plans to return for follow up: 5/06/2024    Aidee Conti, [facility]                                                      ______________________________________________________________________    Individual Treatment Plan    Patient's Name: Dalia Hart  YOB: 1970    Date of Creation: 7/13/2023  Date Treatment Plan Last Reviewed/Revised: 4/24/2024    DSM5 Diagnoses:   1. Major depressive disorder, recurrent episode, moderate (H)    2. Adjustment disorder with anxious mood      Psychosocial / Contextual Factors: Patient identified the following stressors/concerns: financial, patient recently lost her job, and parenting stress. Patent report that she is not getting a lot of sleep and  reports that she does not always get regular sleep.   PROMIS (reviewed every 90 days): 24    Referral / Collaboration:  Referral to another professional/service is not indicated at this time..    Anticipated number of session for this episode of care: 9-12 sessions  Anticipation frequency of session: Weekly  Anticipated Duration of each session: 38-52 minutes  Treatment plan will be reviewed in 90 days or when goals have been changed.       MeasurableTreatment Goal(s) related to diagnosis / functional impairment(s)  Goal 1: Decrease severity of depression symptoms.       Objective #A (Patient Action)    Patient will Decrease frequency and intensity of feeling down, depressed, hopeless.  Status: New - Date: 7/13/2023  Continued dates: 10/19/2023, 1/18/2024, 4/24/2024    Intervention(s)  Client Centered  CBT  Psycho-dynamic  Internal Family Systems  Psycho-education    Objective #B  Patient will Improve quantity and quality of night time sleep / decrease daytime naps.  Status: New - Date: 7/13/2023  Continued dates: 10/19/2023, 1/18/2024, 4/24/2024    Intervention(s)  Client Centered  CBT  Internal Family Systems  Psycho-education  Mindulness    Objective #C  Patient will Identify negative self-talk and behaviors: challenge core beliefs, myths, and actions.  Status: New - Date: 7/13/2023  Continued dates: 10/19/2023, 1/18/2024, 4/24/2024    Intervention(s)  Client Centered  CBT  Internal Family Systems  Psycho-education      Goal 2: Decrease severity of anxiety related symptoms.       Objective #A (Patient Action)    Status: New - Date: 7/13/2023  Continued dates: 10/19/2023, 1/18/2024, 4/24/2024    Patient will identify triggers/ fears / thoughts that contribute to feeling anxious.    Intervention(s)  Psychodynamic  CBT  Internal Family Systems    Objective #B  Patient will use 1 or more coping skills for anxiety management each week.   Status: New - Date: 7/13/2023  Continued dates: 10/19/2023, 1/18/2024,  4/24/2024    Intervention(s)  Therapist will teach emotional regulation skills. (mindfulness, breathing techniques, progressive muscle relaxation) .  Co-develop short-term goals and review progress in session.   CBT  Internal Family Systems  Mindfulness      Goal 3: Improve interpersonal relationships.       Objective #A (Patient Action)    Status: New - Date: 7/13/2023  Continued dates: 10/19/2023, 1/18/2024, 4/24/2024    Patient will compile a list of boundaries that they would like to set with others.    Intervention(s)  Client Centered  Motivational Interviewing  Psycho-education  Psychodynamic  Internal Family Systems.     Objective #B  Patient will practice setting boundaries 1 or more times each week.    Status: New - Date: 7/13/2023  Continued dates: 10/19/2023, 1/18/2024, 4/24/2024    Intervention(s)  Client Centered  Motivational Interviewing  Co-develop short-term goals and review progress in session.         Patient has reviewed and agreed to the above plan.    Aidee Conti, [facility] April 24, 2024

## 2024-04-26 ASSESSMENT — PATIENT HEALTH QUESTIONNAIRE - PHQ9
10. IF YOU CHECKED OFF ANY PROBLEMS, HOW DIFFICULT HAVE THESE PROBLEMS MADE IT FOR YOU TO DO YOUR WORK, TAKE CARE OF THINGS AT HOME, OR GET ALONG WITH OTHER PEOPLE: NOT DIFFICULT AT ALL
SUM OF ALL RESPONSES TO PHQ QUESTIONS 1-9: 4

## 2024-04-26 ASSESSMENT — ANXIETY QUESTIONNAIRES
8. IF YOU CHECKED OFF ANY PROBLEMS, HOW DIFFICULT HAVE THESE MADE IT FOR YOU TO DO YOUR WORK, TAKE CARE OF THINGS AT HOME, OR GET ALONG WITH OTHER PEOPLE?: SOMEWHAT DIFFICULT
GAD7 TOTAL SCORE: 2
7. FEELING AFRAID AS IF SOMETHING AWFUL MIGHT HAPPEN: NOT AT ALL
GAD7 TOTAL SCORE: 2

## 2024-04-26 NOTE — COMMUNITY RESOURCES LIST (ENGLISH)
April 26, 2024           YOUR PERSONALIZED LIST OF SERVICES & PROGRAMS           NAVIGATION    Eligibility Screening      [facility] Food HelpLine  Phone: (906) 234-1993  Website: https://www.Wootocracy.org/find-help/  Language: English, Gibraltarian  Hours: Mon 10:00 AM - 5:00 PM Tue 10:00 AM - 5:00 PM Wed 10:00 AM - 5:00 PM Thu 10:00 AM - 5:00 PM Fri 10:00 AM - 5:00 PM  Fee: Free  Accessibility: Ada accessible, Blind accommodation, Deaf or hard of hearing, Translation services      [employer] [place] - SNAP (formerly food stamps) Screening and Application help  Phone: (613) 977-2648  Website: https://www.Wootocracy.org/programs/[place]-food-helpline/  Language: English  Hours: Mon 10:00 AM - 5:00 PM Tue 10:00 AM - 5:00 PM Wed 10:00 AM - 5:00 PM Thu 10:00 AM - 5:00 PM Fri 10:00 AM - 5:00 PM  Fee: Free  Accessibility: Ada accessible, Blind accommodation, Deaf or hard of hearing, Translation services      [employer] - [employer]igators  Phone: (872) 191-4678  Website: https://www.[facility].org/about-us/assister-program/navigators/index.jsp  Language: English  Hours: Mon 8:00 AM - 4:00 PM Tue 8:00 AM - 4:00 PM Wed 8:00 AM - 4:00 PM Thu 8:00 AM - 4:00 PM        ASSISTANCE    Nutrition Benefits      [facility] Food HelpLine  Phone: (510) 243-1783  Website: https://www.Wootocracy.org/find-help/  Language: English, Gibraltarian  Hours: Mon 10:00 AM - 5:00 PM Tue 10:00 AM - 5:00 PM Wed 10:00 AM - 5:00 PM Thu 10:00 AM - 5:00 PM Fri 10:00 AM - 5:00 PM  Fee: Free  Accessibility: Ada accessible, Blind accommodation, Deaf or hard of hearing, Translation services      [employer] [place] [employer] SNAP (formerly food stamps) Screening and Application help  Phone: (720) 925-1819  Website: https://www.Wootocracy.org/programs/[place]-food-helpline/  Language: English  Hours: Mon 10:00 AM - 5:00 PM Tue 10:00 AM - 5:00 PM Wed 10:00 AM - 5:00 PM Thu 10:00 AM - 5:00 PM Fri 10:00 AM - 5:00 PM   Fee: Free  Accessibility: Ada accessible, Blind accommodation, Deaf or hard of hearing, Translation services      [employer] [place] [employer]  Phone: (922) 240-3077  Website: https://www.Maxtenaorg/programs/market-emmett/  Language: English  Hours: Mon 10:00 AM - 5:00 PM Tue 10:00 AM - 5:00 PM Wed 10:00 AM - 5:00 PM Thu 10:00 AM - 5:00 PM Fri 10:00 AM - 5:00 PM  Fee: Self pay    Pantry      [facility] Food pantry  [address] [place] [address] (Distance: 9.4 miles)  Phone: (210) 151-4401  Website: http://www.saintolaf.org/  Language: English  Fee: Free  Accessibility: Ada accessible      [employer] [place] [employer] Food Shelf   Phone: (469) 457-2072  Website: https://www.[employer]/find-help/  Language: English  Hours: Mon 10:00 AM - 5:00 PM Tue 10:00 AM - 5:00 PM Wed 10:00 AM - 5:00 PM Thu 10:00 AM - 5:00 PM Fri 10:00 AM - 5:00 PM  Fee: Free  Accessibility: Ada accessible, Blind accommodation, Deaf or hard of hearing, Translation services      [employer]  Phone: (716) 824-7982  Website: https://www.[employer].Frodio/empowerment-food-bank  Language: English  Hours: Mon 9:00 AM - 5:00 PM Tue 9:00 AM - 5:00 PM Wed 9:00 AM - 5:00 PM Thu 9:00 AM - 5:00 PM Fri 9:00 AM - 5:00 PM  Fee: Free               IMPORTANT NUMBERS & WEBSITES        Emergency Services  911  .   United Way  211 http://211unitedway.org  .   Poison Control  (471) 369-7994 http://mnpoison.org http://wis[place]poison.org  .     Suicide and Crisis Lifeline  988 http://988lifeline.org  .   Childhelp National Child Abuse Hotline  943.178.1201 http://Childhelphotline.org   .   National Sexual Assault Hotline  (335) 288-7309 (HOPE) http://Rainn.org   .     National Runaway Safeline  (108) 928-7817 (RUNAWAY) http://[employer].org  .   Pregnancy & Postpartum Support  Call/text 777-137-5109  [place]: http://ppsupportmn.org  [place]: http://TRiQ.com/wi  .   Substance Abuse  National Helpline ([facility])  800-622-HELP (7180) http://Findtreatment.gov   .                DISCLAIMER: These resources have been generated via the [employer] Platform. [employer] does not endorse any service providers mentioned in this resource list. [employer] does not guarantee that the services mentioned in this resource list will be available to you or will improve your health or wellness.    [facility]

## 2024-04-26 NOTE — COMMUNITY RESOURCES LIST (ENGLISH)
April 26, 2024           YOUR PERSONALIZED LIST OF SERVICES & PROGRAMS           NAVIGATION    Eligibility Screening      [facility] HelpLine  Phone: (849) 131-1982  Website: https://www.[employer].org/find-help/  Language: English, Maldivian  Hours: Mon 10:00 AM - 5:00 PM Tue 10:00 AM - 5:00 PM Wed 10:00 AM - 5:00 PM Thu 10:00 AM - 5:00 PM Fri 10:00 AM - 5:00 PM  Fee: Free  Accessibility: Ada accessible, Blind accommodation, Deaf or hard of hearing, Translation services      [employer] [place] - SNAP (formerly food stamps) Screening and Application help  Phone: (672) 393-9869  Website: https://www.[employer].org/programs/[place]-food-helpline/  Language: English  Hours: Mon 10:00 AM - 5:00 PM Tue 10:00 AM - 5:00 PM Wed 10:00 AM - 5:00 PM Thu 10:00 AM - 5:00 PM Fri 10:00 AM - 5:00 PM  Fee: Free  Accessibility: Ada accessible, Blind accommodation, Deaf or hard of hearing, Translation services      [employer]  Phone: (895) 717-8749  Website: https://www.[facility].org/about-us/assister-program/navigators/index.jsp  Language: English  Hours: Mon 8:00 AM - 4:00 PM Tue 8:00 AM - 4:00 PM Wed 8:00 AM - 4:00 PM Thu 8:00 AM - 4:00 PM        ASSISTANCE    Nutrition Benefits      [facility] HelpLine  Phone: (972) 627-7272  Website: https://www.[employer].org/find-help/  Language: English, Maldivian  Hours: Mon 10:00 AM - 5:00 PM Tue 10:00 AM - 5:00 PM Wed 10:00 AM - 5:00 PM Thu 10:00 AM - 5:00 PM Fri 10:00 AM - 5:00 PM  Fee: Free  Accessibility: Ada accessible, Blind accommodation, Deaf or hard of hearing, Translation services      [employer] [place] [employer] SNAP (formerly food stamps) Screening and Application help  Phone: (965) 622-3565  Website: https://www.[employer].org/programs/[place]-food-helpline/  Language: English  Hours: Mon 10:00 AM - 5:00 PM Tue 10:00 AM - 5:00 PM Wed 10:00 AM - 5:00 PM Thu 10:00 AM - 5:00 PM Fri 10:00 AM - 5:00 PM   Fee: Free  Accessibility: Ada accessible, Blind accommodation, Deaf or hard of hearing, Translation services      [facility] [place] [employer]  Phone: (672) 872-2340  Website: https://www.[employer]org/programs/market-emmett/  Language: English  Hours: Mon 10:00 AM - 5:00 PM Tue 10:00 AM - 5:00 PM Wed 10:00 AM - 5:00 PM Thu 10:00 AM - 5:00 PM Fri 10:00 AM - 5:00 PM  Fee: Self pay    Pantry      [facility] - Food pantry  [address] [place] [address] (Distance: 9.4 miles)  Phone: (995) 116-9431  Website: http://www.saintolaf.org/  Language: English  Fee: Free  Accessibility: Ada accessible      [employer] [place] [employer]   Phone: (733) 111-8426  Website: https://www.[employer]/find-help/  Language: English  Hours: Mon 10:00 AM - 5:00 PM Tue 10:00 AM - 5:00 PM Wed 10:00 AM - 5:00 PM Thu 10:00 AM - 5:00 PM Fri 10:00 AM - 5:00 PM  Fee: Free  Accessibility: Ada accessible, Blind accommodation, Deaf or hard of hearing, Translation services      [employer]  Phone: (867) 865-3143  Website: https://www.[employer].Cloudfind/empowerment-food-bank  Language: English  Hours: Mon 9:00 AM - 5:00 PM Tue 9:00 AM - 5:00 PM Wed 9:00 AM - 5:00 PM Thu 9:00 AM - 5:00 PM Fri 9:00 AM - 5:00 PM  Fee: Free               IMPORTANT NUMBERS & WEBSITES        Emergency Services  911  .   United Way  211 http://211unitedway.org  .   Poison Control  (195) 240-3391 http://[place]poison.org http://wis[place]poison.org  .     Suicide and Crisis Lifeline  988 http://988lifeline.org  .   Childhelp National Child Abuse Hotline  224.790.8032 http://Childhelphotline.org   .   National Sexual Assault Hotline  (236) 290-7871 (HOPE) http://Rainn.org   .     National Runaway Safeline  (652) 772-1913 (RUNAWAY) http://OvelinruVibrow.org  .   Pregnancy & Postpartum Support  Call/text 151-034-1342  [place]: http://ppsupportmn.org  [place]: http://Cuciniale.com/wi  .   Substance Abuse  National Helpline ([facility])  800-622-HELP (5804) http://Findtreatment.gov   .                DISCLAIMER: These resources have been generated via the [employer] Platform. [employer] does not endorse any service providers mentioned in this resource list. [employer] does not guarantee that the services mentioned in this resource list will be available to you or will improve your health or wellness.    [facility]

## 2024-04-29 ENCOUNTER — VIRTUAL VISIT (OUTPATIENT)
Dept: FAMILY MEDICINE | Facility: CLINIC | Age: 54
End: 2024-04-29
Payer: COMMERCIAL

## 2024-04-29 DIAGNOSIS — F43.22 ADJUSTMENT DISORDER WITH ANXIOUS MOOD: ICD-10-CM

## 2024-04-29 DIAGNOSIS — F90.0 ADHD (ATTENTION DEFICIT HYPERACTIVITY DISORDER), INATTENTIVE TYPE: Primary | ICD-10-CM

## 2024-04-29 PROCEDURE — 99214 OFFICE O/P EST MOD 30 MIN: CPT | Mod: 95 | Performed by: FAMILY MEDICINE

## 2024-04-29 RX ORDER — DEXTROAMPHETAMINE SACCHARATE, AMPHETAMINE ASPARTATE MONOHYDRATE, DEXTROAMPHETAMINE SULFATE AND AMPHETAMINE SULFATE 2.5; 2.5; 2.5; 2.5 MG/1; MG/1; MG/1; MG/1
10 CAPSULE, EXTENDED RELEASE ORAL DAILY
Qty: 30 CAPSULE | Refills: 0 | Status: CANCELLED | OUTPATIENT
Start: 2024-04-29

## 2024-04-29 RX ORDER — DEXTROAMPHETAMINE SACCHARATE, AMPHETAMINE ASPARTATE MONOHYDRATE, DEXTROAMPHETAMINE SULFATE AND AMPHETAMINE SULFATE 2.5; 2.5; 2.5; 2.5 MG/1; MG/1; MG/1; MG/1
10 CAPSULE, EXTENDED RELEASE ORAL DAILY
Qty: 30 CAPSULE | Refills: 0 | Status: SHIPPED | OUTPATIENT
Start: 2024-06-30 | End: 2024-07-02

## 2024-04-29 RX ORDER — DEXTROAMPHETAMINE SACCHARATE, AMPHETAMINE ASPARTATE MONOHYDRATE, DEXTROAMPHETAMINE SULFATE AND AMPHETAMINE SULFATE 2.5; 2.5; 2.5; 2.5 MG/1; MG/1; MG/1; MG/1
10 CAPSULE, EXTENDED RELEASE ORAL DAILY
Qty: 30 CAPSULE | Refills: 0 | Status: SHIPPED | OUTPATIENT
Start: 2024-04-29 | End: 2024-05-29

## 2024-04-29 RX ORDER — DEXTROAMPHETAMINE SACCHARATE, AMPHETAMINE ASPARTATE MONOHYDRATE, DEXTROAMPHETAMINE SULFATE AND AMPHETAMINE SULFATE 2.5; 2.5; 2.5; 2.5 MG/1; MG/1; MG/1; MG/1
10 CAPSULE, EXTENDED RELEASE ORAL DAILY
Qty: 30 CAPSULE | Refills: 0 | Status: SHIPPED | OUTPATIENT
Start: 2024-05-30 | End: 2024-06-29

## 2024-04-29 NOTE — PROGRESS NOTES
Leila is a 53 year old who is being evaluated via a billable video visit.    How would you like to obtain your AVS? MyChart  If the video visit is dropped, the invitation should be resent by: Text to cell phone: [phone]  Will anyone else be joining your video visit? No      Assessment & Plan     ADHD (attention deficit hyperactivity disorder), inattentive type  She was given refills of Adderall which continue to help control ADHD symptoms without unwanted side effects.  She is going to schedule follow-up in 3 months or sooner if needed.  - amphetamine-dextroamphetamine (ADDERALL XR) 10 MG 24 hr capsule; Take 1 capsule (10 mg) by mouth daily for 30 days  - amphetamine-dextroamphetamine (ADDERALL XR) 10 MG 24 hr capsule; Take 1 capsule (10 mg) by mouth daily for 30 days  - amphetamine-dextroamphetamine (ADDERALL XR) 10 MG 24 hr capsule; Take 1 capsule (10 mg) by mouth daily for 30 days    Adjustment disorder with anxious mood  She feels like this issue is stable.  I recommended that she keep following with her therapist.              Counseling  Appropriate preventive services were discussed with this patient, including applicable screening as appropriate for fall prevention, nutrition, physical activity, Tobacco-use cessation, weight loss and cognition.  Checklist reviewing preventive services available has been given to the patient.          Subjective   Leila is a 53 year old, presenting for the following health issues:  A.D.H.D      4/29/2024     9:38 AM   Additional Questions   Roomed by Gracie ROCHE   Accompanied by self     Video Start Time: 11:02 AM    A.D.H.D     ADHD Follow-Up    Date of last ADHD office visit: 02/05/2024  Status since last visit: Improving  Taking controlled (daily) medications as prescribed: No- taking PRN                       Parent/Patient Concerns with Medications: None  ADHD Medication       Amphetamines Disp Start End     amphetamine-dextroamphetamine (ADDERALL XR) 10 MG 24 hr  "capsule 30 capsule 2/5/2024 --    Sig - Route: Take 1 capsule (10 mg) by mouth daily - Oral    Class: E-Prescribe    Earliest Fill Date: 2/5/2024          She recently underwent formal ADHD testing which was consistent with ADHD inattentive type. She has also had some depression and anxiety issues which she attributes to grief in the past. She is working with a therapist and these issues are stable.  She was recently prescribed Adderall XR 10 mg daily which has been helpful in controlling ADHD symptoms.  She denies any significant unwanted side effects.  Appetite, sleep and mood have been fine.                  Review of Systems  Constitutional, HEENT, cardiovascular, pulmonary, GI, , musculoskeletal, neuro, skin, endocrine and psych systems are negative, except as otherwise noted.      Objective    Vitals - Patient Reported  Weight (Patient Reported): 94.8 kg (209 lb)  Height (Patient Reported): 167.6 cm (5' 6\")  BMI (Based on Pt Reported Ht/Wt): 33.73        Physical Exam   GENERAL: alert and no distress  RESP: No audible wheeze, cough, or visible cyanosis.    SKIN: Visible skin clear. No significant rash, abnormal pigmentation or lesions.  NEURO: Cranial nerves grossly intact.  Mentation and speech appropriate for age.  PSYCH: Appropriate affect, tone, and pace of words          Video-Visit Details    Type of service:  Video Visit   Video End Time:11:12 AM  Originating Location (pt. Location): Home    Distant Location (provider location):  On-site  Platform used for Video Visit: Lisa  Signed Electronically by: Yony Alonzo, DO    Answers submitted by the patient for this visit:  Patient Health Questionnaire (Submitted on 4/26/2024)  If you checked off any problems, how difficult have these problems made it for you to do your work, take care of things at home, or get along with other people?: Not difficult at all  PHQ9 TOTAL SCORE: 4  ROSMERY-7 (Submitted on 4/26/2024)  ROSMERY 7 TOTAL SCORE: 2    "

## 2024-05-06 ENCOUNTER — VIRTUAL VISIT (OUTPATIENT)
Dept: PSYCHOLOGY | Facility: CLINIC | Age: 54
End: 2024-05-06
Payer: COMMERCIAL

## 2024-05-06 DIAGNOSIS — F43.22 ADJUSTMENT DISORDER WITH ANXIOUS MOOD: ICD-10-CM

## 2024-05-06 DIAGNOSIS — F33.1 MAJOR DEPRESSIVE DISORDER, RECURRENT EPISODE, MODERATE (H): ICD-10-CM

## 2024-05-06 DIAGNOSIS — F90.0 ADHD (ATTENTION DEFICIT HYPERACTIVITY DISORDER), INATTENTIVE TYPE: Primary | ICD-10-CM

## 2024-05-06 PROCEDURE — 90832 PSYTX W PT 30 MINUTES: CPT | Mod: 95 | Performed by: SOCIAL WORKER

## 2024-05-06 NOTE — PROGRESS NOTES
"    [facility]                                     Progress Note    Patient Name: Dalia Hart  Date: May 6, 2024         Service Type: Individual      Session Start Time: 2:17 PM Session End Time: 2:53 M     Session Length: 36 min     Session #: 28    Attendees: Client attended alone    Service Modality: Video Visit via CartoDB      Provider verified identity through the following two step process.  Patient provided:  Patient  and Patient is known previously to provider     Telemedicine Visit: The patient's condition can be safely assessed and treated via synchronous audio and visual telemedicine encounter.      Reason for Telemedicine Visit: Patient convenience (e.g. access to timely appointments / distance to available provider)    Originating Site (Patient Location): Patient's place of employment    Distant Site (Provider Location): [facility]    Consent:  The patient/guardian has verbally consented to: the potential risks and benefits of telemedicine (video visit) versus in person care; bill my insurance or make self-payment for services provided; and responsibility for payment of non-covered services.   Distant Location (Provider):  On-site    As the provider I attest to compliance with applicable laws and regulations related to telemedicine.    DATA  Interactive Complexity: No  Crisis: No        Progress Since Last Session (Related to Symptoms / Goals / Homework):   Symptoms: No change in symptoms reported.  Patient continues to presented with ADHD and adjustment disorder related symptoms. The patient reports that she is doing \"pretty good\".     Homework: Achieved / completed to satisfaction      Episode of Care Goals: Satisfactory progress - ACTION (Actively working towards change); Intervened by reinforcing change plan / affirming steps taken     Current / Ongoing Stressors and Concerns:   The patient continues to report and " present with: ADHD, depression, adjustment disorder with anxious mood symptoms. The patient identified the following stressors and concerns: patient reports that she is still struggling to have to be someplace every day and having to be accountable to other people and parenting stress. The patient processed through her internal experiences related to: her job, parenting, her current living situation, her recent interpersonal interactions, and dating.      Treatment Objective(s) Addressed in This Session:   Continued to address:  Decrease frequency and intensity of feeling down, depressed, hopeless  Patient will practice setting boundaries 1 or more times each week.  Improve quantity and quality of night time sleep / decrease daytime naps     Intervention:   Client Centered   Psychodynamic  Motivational Interviewing   Validation and Normalization   Co-develop short-term goals and review progress in session.    Assessments completed prior to visit: None  The following assessments were completed by patient for this visit: None     ASSESSMENT: Current Emotional / Mental Status (status of significant symptoms):   Risk status (Self / Other harm or suicidal ideation)   Patient denies current fears or concerns for personal safety.   Patient denies current or recent suicidal ideation or behaviors.   Patient denies current or recent homicidal ideation or behaviors.   Patient denies current or recent self injurious behavior or ideation.   Patient denies other safety concerns.   Patient reports there has been no change in risk factors since their last session.     Patient reports there has been no change in protective factors since their last session.     Recommended that patient call 911 or go to the local ED should there be a change in any of these risk factors.     Appearance:   Appropriate    Eye Contact:   Good    Psychomotor Behavior: Normal    Attitude:   Cooperative  Interested Friendly Pleasant  Attentive   Orientation:   All   Speech    Rate / Production: Normal/ Responsive    Volume:  Normal    Mood:    Normal   Affect:    Appropriate    Thought Content:  Clear    Thought Form:  Coherent  Logical    Insight:    Good      Medication Review:   No changes to current psychiatric medication(s)     Medication Compliance:   Yes     Changes in Health Issues:   None reported     Chemical Use Review:   Substance Use: Chemical use reviewed, no active concerns identified      Tobacco Use: Not addressed in visit.     Diagnosis:  1. ADHD (attention deficit hyperactivity disorder), inattentive type    2. Adjustment disorder with anxious mood    3. Major depressive disorder, recurrent episode, moderate (H)      Collateral Reports Completed:   Not Applicable    PLAN: (Patient Tasks / Therapist Tasks / Other  Patient plans to continue to focus on establishing a regular sleep schedule.   Patient plans to continue to organize her space - Patient plans to focus on cleaning her living room.   Patient plans to return for follow up: 5/10/2024    Aidee Conti, [facility]                                                      ______________________________________________________________________    Individual Treatment Plan    Patient's Name: Dalia Hart  YOB: 1970    Date of Creation: 7/13/2023  Date Treatment Plan Last Reviewed/Revised: 4/24/2024    DSM5 Diagnoses:   1. Major depressive disorder, recurrent episode, moderate (H)    2. Adjustment disorder with anxious mood      Psychosocial / Contextual Factors: Patient identified the following stressors/concerns: financial, patient recently lost her job, and parenting stress. Patent report that she is not getting a lot of sleep and reports that she does not always get regular sleep.   PROMIS (reviewed every 90 days): 24    Referral / Collaboration:  Referral to another professional/service is not indicated at this time..    Anticipated number of session for  this episode of care: 9-12 sessions  Anticipation frequency of session: Weekly  Anticipated Duration of each session: 38-52 minutes  Treatment plan will be reviewed in 90 days or when goals have been changed.       MeasurableTreatment Goal(s) related to diagnosis / functional impairment(s)  Goal 1: Decrease severity of depression symptoms.       Objective #A (Patient Action)    Patient will Decrease frequency and intensity of feeling down, depressed, hopeless.  Status: New - Date: 7/13/2023  Continued dates: 10/19/2023, 1/18/2024, 4/24/2024    Intervention(s)  Client Centered  CBT  Psycho-dynamic  Internal Family Systems  Psycho-education    Objective #B  Patient will Improve quantity and quality of night time sleep / decrease daytime naps.  Status: New - Date: 7/13/2023  Continued dates: 10/19/2023, 1/18/2024, 4/24/2024    Intervention(s)  Client Centered  CBT  Internal Family Systems  Psycho-education  Mindulness    Objective #C  Patient will Identify negative self-talk and behaviors: challenge core beliefs, myths, and actions.  Status: New - Date: 7/13/2023  Continued dates: 10/19/2023, 1/18/2024, 4/24/2024    Intervention(s)  Client Centered  CBT  Internal Family Systems  Psycho-education      Goal 2: Decrease severity of anxiety related symptoms.       Objective #A (Patient Action)    Status: New - Date: 7/13/2023  Continued dates: 10/19/2023, 1/18/2024, 4/24/2024    Patient will identify triggers/ fears / thoughts that contribute to feeling anxious.    Intervention(s)  Psychodynamic  CBT  Internal Family Systems    Objective #B  Patient will use 1 or more coping skills for anxiety management each week.   Status: New - Date: 7/13/2023  Continued dates: 10/19/2023, 1/18/2024, 4/24/2024    Intervention(s)  Therapist will teach emotional regulation skills. (mindfulness, breathing techniques, progressive muscle relaxation) .  Co-develop short-term goals and review progress in session.   CBT  Internal Family  Systems  Mindfulness      Goal 3: Improve interpersonal relationships.       Objective #A (Patient Action)    Status: New - Date: 7/13/2023  Continued dates: 10/19/2023, 1/18/2024, 4/24/2024    Patient will compile a list of boundaries that they would like to set with others.    Intervention(s)  Client Centered  Motivational Interviewing  Psycho-education  Psychodynamic  Internal Family Systems.     Objective #B  Patient will practice setting boundaries 1 or more times each week.    Status: New - Date: 7/13/2023  Continued dates: 10/19/2023, 1/18/2024, 4/24/2024    Intervention(s)  Client Centered  Motivational Interviewing  Co-develop short-term goals and review progress in session.         Patient has reviewed and agreed to the above plan.    Aidee Conti, [facility] April 24, 2024

## 2024-05-10 ENCOUNTER — VIRTUAL VISIT (OUTPATIENT)
Dept: PSYCHIATRY | Facility: CLINIC | Age: 54
End: 2024-05-10
Payer: COMMERCIAL

## 2024-05-10 DIAGNOSIS — F90.0 ADHD (ATTENTION DEFICIT HYPERACTIVITY DISORDER), INATTENTIVE TYPE: Primary | ICD-10-CM

## 2024-05-10 DIAGNOSIS — F41.9 ANXIETY DISORDER, UNSPECIFIED TYPE: ICD-10-CM

## 2024-05-10 PROCEDURE — 99205 OFFICE O/P NEW HI 60 MIN: CPT | Mod: 95 | Performed by: NURSE PRACTITIONER

## 2024-05-10 ASSESSMENT — PATIENT HEALTH QUESTIONNAIRE - PHQ9
SUM OF ALL RESPONSES TO PHQ QUESTIONS 1-9: 4
SUM OF ALL RESPONSES TO PHQ QUESTIONS 1-9: 4
10. IF YOU CHECKED OFF ANY PROBLEMS, HOW DIFFICULT HAVE THESE PROBLEMS MADE IT FOR YOU TO DO YOUR WORK, TAKE CARE OF THINGS AT HOME, OR GET ALONG WITH OTHER PEOPLE: NOT DIFFICULT AT ALL

## 2024-05-10 ASSESSMENT — PAIN SCALES - GENERAL
PAINLEVEL: NO PAIN (0)
PAINLEVEL: NO PAIN (0)
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
## 2024-05-10 NOTE — NURSING NOTE
Is the patient currently in the state of [place]? YES    Visit mode:VIDEO    If the visit is dropped, the patient can be reconnected by: VIDEO VISIT: Text to cell phone:   Telephone Information:   Mobile [phone]   Mobile [phone]       Will anyone else be joining the visit? NO  (If patient encounters technical issues they should call [phone] :[phone])    How would you like to obtain your AVS? MyChart    Are changes needed to the allergy or medication list? No    Are refills needed on medications prescribed by this physician? NO    Reason for visit: Consult    Care team has reviewed attendance agreement with patient. Patient advised that two failed appointments within 6 months may lead to termination of current episode of care.        Mariann PORTILLO

## 2024-05-10 NOTE — PATIENT INSTRUCTIONS
Treatment plan today   Follow up in 6 weeks (or sooner as needed)  Medication changes   No changes  Refill(S) on file    What to Expect and What NOT to Expect from Stimulant Medication  by Faiza Vital, PhD  Many clients taking stimulant medication aren t sure what to expect from it. They may have unrealistic expectations of their medication and decide it s not working. Or they may have become used to the benefits of stimulant medication and think it s no longer working.    Here s a list of things to expect when taking stimulant medications. Of course, everyone has a different reaction and a different level of sensitivity to medication, so some seem to benefit much more clearly than others.    A good response to stimulant medication typically results in:  - Improved attention span - being able to read longer while staying focused; being able to listen longer while staying focused.  - Reduced distractability - being able to remain focused when some distractions occur around you.  - Greater ease in getting back on task after an interruption.  - Better working memory - i.e., being able to remember the three things you went downstairs to get.  - Easier to start tasks and complete them.  - Reduced feeling of stress and overwhelm.  - Decreased irritability and over-reactivity  - Reduced feelings of restlessness or hyperactivity  - Reduced impulsiveness - less likely to interrupt, to make decisions with little consideration for costs or consequences  However, as Adi Crook MD, said so memorably,  Pills don t build skills.  You shouldn t expect stimulant medication to help you organize your files, improve your study skills, write your paper, prioritize your tasks, reduce your clutter, or problem-solve better. But it will put your brain in a state where these skills can be more easily learned.  Often, the best pairing is stimulant medication in combination with a therapist, organizer or  that is helping you  build the skills you need to succeed now that you re able to focus and concentrate.    ADHD medication expectations:   https://[email or web address]/slideshows/pua-ufcm-axeo-medication-work/      Communication  Call the psychiatric nurse line with medication questions or concerns at [phone] or [phone].  Triptelligenthart may be used to communicate with your care team, but this is not intended to be used for emergencies.  You can call the above number to make appointments, leave a message with our nursing team, and inquire about any mental health referrals I have placed.  Please call your pharmacy to request a refill of your medications listed above if needed between appointments.   Safety Plan - see below for crisis resources   Call or text [phone] for mental health crisis.   Call 911 or use ER for potentially life-threatening situations.     JAYESH Nash, PMYUDIP  [facility]   [facility]         RESOURCES     Crisis Resources  For emergency help, please call 911 or go to the nearest Emergency Department.     Emergency Walk-In Options:   EmPATH Unit @ [facility] ([place]): [phone] - Specialized mental health emergency area designed to be calming  [facility] ([place]): [phone]  [facility] ([place]): [phone]  [facility]): [phone]    [facility] Crisis Information:   [place]: [phone]  [place]: [phone]  [place] ([place]) - Adult: [phone]     Child: [phone]  [place] - Adult: [phone]     Child: [phone]  [place]: [phone]  [facility], [place], [facility], [facility] & [facility], and the [facility] of [facility]: [phone] or TEXT  [place]  to [phone]  List of all [facility] resources:   https://[place].gov/dhs/people-we-serve/adults/health-care/mental-health/resources/crisis-contacts.jsp    National Crisis Information:   National Suicide & Crisis Lifeline: Call 135   For online chat options,  "visit https://suicidepreventionlifeline.org/chat/  Poison Control Center: 1-654-824-1612  Poison Control Center: 2-427-618-1037  Trans Lifeline: 1-598.472.9814 - Hotline for transgender people of all ages  The Brendan Project: 1-397.621.1132 - Hotline for LGBT youth     For Non-Emergency Support:   Fast Tracker: Mental Health & Substance Use Disorder Resources -   https://www.Vtrim.org/    Additional Resources  Financial Assistance [phone]  [facility] Billing [phone]  Central Billing Office, [facility]: [phone]  [place] Billing [phone]  Medical Records [phone]  [place] Patient Bill of Rights [email or web address]         Patient Education   [facility]  What to Expect  Here's what to expect in the [facility].   About the program  You'll be meeting with a psychiatric doctor to check your mental health. A psychiatric doctor helps you deal with troubling thoughts and feelings by giving you medicine. They'll make sure you know the plan for your care. You may see them for a long time. When you're feeling better, they may refer you back to seeing your family doctor.   If you have any questions, we'll be glad to talk to you.  About visits  Be open  At your visits, please talk openly about your problems. It may feel hard, but it's the best way for us to help you.  Cancelling visits  If you can't come to your visit, please call us right away at [phone]. If you don't cancel at least 24 hours (1 full day) before your visit, that's \"late cancellation.\"  Not showing up for your visits  Being very late is the same as not showing up. You'll be a \"no show\" if:  You're more than 15 minutes late for a 30-minute (half hour) visit.  You're more than 30 minutes late for a 60-minute (full hour) visit.  If you cancel late or don't show up 2 times within 6 months, we may end your care.  Getting help between " visits  If you need help between visits, you can call us Monday to Friday from 8 a.m. to 4:30 p.m. at [phone].  Emergency care  Call 911 or go to the nearest emergency department if your life or someone else's life is in danger.  Call 988 anytime to reach the national Suicide and Crisis hotline.  Medicine refills  To refill your medicine, call your pharmacy. You can also call [facility]'s [facility]al [facility] at [phone], Monday to Friday, 8 a.m. to 4:30 p.m. It can take 1 to 3 business days to get a refill.   Forms, letters, and tests  You may have papers to fill out, like FMLA, short-term disability, and workability. We can help you with these forms at your visits, but you must have an appointment. You may need more than 1 visit for this, to be in an intensive therapy program, or both.  Before we can give you medicine for ADHD, we may refer you to get tested for it or confirm it another way.  We may not be able to give you an emotional support animal letter.  We don't do mental health checks ordered by the court.   We don't do mental health testing, but we can refer you to get tested.   Thank you for choosing us for your care.  For informational purposes only. Not to replace the advice of your health care provider. Copyright   2022 [facility]. All rights reserved. [place] 615029 - 12/22.

## 2024-06-05 ENCOUNTER — VIRTUAL VISIT (OUTPATIENT)
Dept: PSYCHOLOGY | Facility: CLINIC | Age: 54
End: 2024-06-05
Payer: COMMERCIAL

## 2024-06-05 DIAGNOSIS — F90.0 ADHD (ATTENTION DEFICIT HYPERACTIVITY DISORDER), INATTENTIVE TYPE: Primary | ICD-10-CM

## 2024-06-05 DIAGNOSIS — F33.1 MAJOR DEPRESSIVE DISORDER, RECURRENT EPISODE, MODERATE (H): ICD-10-CM

## 2024-06-05 DIAGNOSIS — F43.22 ADJUSTMENT DISORDER WITH ANXIOUS MOOD: ICD-10-CM

## 2024-06-05 PROCEDURE — 90837 PSYTX W PT 60 MINUTES: CPT | Mod: 95 | Performed by: SOCIAL WORKER

## 2024-06-05 NOTE — PROGRESS NOTES
"    [facility]                                     Progress Note    Patient Name: Dalia Hart  Date: 2024       Service Type: Individual      Session Start Time: 4:14 PM Session End Time:  5:11 PM     Session Length: 57 min     Session #: 29    Attendees: Client attended alone    Service Modality: Video Visit via [email or web address]      Provider verified identity through the following two step process.  Patient provided:  Patient  and Patient is known previously to provider     Telemedicine Visit: The patient's condition can be safely assessed and treated via synchronous audio and visual telemedicine encounter.      Reason for Telemedicine Visit: Patient convenience (e.g. access to timely appointments / distance to available provider)    Originating Site (Patient Location): Patient's place of employment    Distant Site (Provider Location): [facility] [place]    Consent:  The patient/guardian has verbally consented to: the potential risks and benefits of telemedicine (video visit) versus in person care; bill my insurance or make self-payment for services provided; and responsibility for payment of non-covered services.   Distant Location (Provider):  On-site    As the provider I attest to compliance with applicable laws and regulations related to telemedicine.    DATA  Interactive Complexity: No  Crisis: No        Progress Since Last Session (Related to Symptoms / Goals / Homework):   Symptoms: No change in symptoms reported.  Patient continues to present with ADHD and adjustment disorder related symptoms. The patient reports that she is doing \"pretty good\".     Homework: Achieved / completed to satisfaction      Episode of Care Goals: Satisfactory progress - ACTION (Actively working towards change); Intervened by reinforcing change plan / affirming steps taken     Current / Ongoing Stressors and Concerns:   The patient continues to report and " present with: ADHD, depression, adjustment disorder with anxious mood symptoms. The patient identified the following stressors and concerns: patient reports that she has had a hard couple of weeks because all of her children were home at the same time, the anniversary of her mother's death hit her harder than usual, interpersonal stress, and parenting stress.     Treatment Objective(s) Addressed in This Session:   Continued to address:  Decrease frequency and intensity of feeling down, depressed, hopeless  Patient will practice setting boundaries 1 or more times each week.  Improve quantity and quality of night time sleep / decrease daytime naps     Intervention:   Client Centered   Psychodynamic  Motivational Interviewing   Validation and Normalization   Co-develop short-term goals and review progress in session.    Assessments completed prior to visit: None  The following assessments were completed by patient for this visit: None     ASSESSMENT: Current Emotional / Mental Status (status of significant symptoms):   Risk status (Self / Other harm or suicidal ideation)   Patient denies current fears or concerns for personal safety.   Patient denies current or recent suicidal ideation or behaviors.   Patient denies current or recent homicidal ideation or behaviors.   Patient denies current or recent self injurious behavior or ideation.   Patient denies other safety concerns.   Patient reports there has been no change in risk factors since their last session.     Patient reports there has been no change in protective factors since their last session.     Recommended that patient call 911 or go to the local ED should there be a change in any of these risk factors.     Appearance:   Appropriate    Eye Contact:   Good    Psychomotor Behavior: Normal    Attitude:   Cooperative  Interested Friendly Pleasant Attentive   Orientation:   All   Speech    Rate / Production: Normal/ Responsive    Volume:  Normal     Mood:    Normal   Affect:    Appropriate    Thought Content:  Clear    Thought Form:  Coherent  Logical    Insight:    Good      Medication Review:   No changes to current psychiatric medication(s)     Medication Compliance:   Yes     Changes in Health Issues:   None reported     Chemical Use Review:   Substance Use: Chemical use reviewed, no active concerns identified      Tobacco Use: Not addressed in visit.     Diagnosis:  1. ADHD (attention deficit hyperactivity disorder), inattentive type    2. Adjustment disorder with anxious mood    3. Major depressive disorder, recurrent episode, moderate (H)        Collateral Reports Completed:   Not Applicable    PLAN: (Patient Tasks / Therapist Tasks / Other  Patient plans to focus on keeping her parenting commitments.   Patient plans to continue to focus on establishing a regular sleep schedule.   Patient plans to continue to organize her space - Patient plans to focus on cleaning her living room.   Patient plans to return for follow up: 6/19/2024    Aidee Conti, [facility]                                                      ______________________________________________________________________    Individual Treatment Plan    Patient's Name: Dalia Hart  YOB: 1970    Date of Creation: 7/13/2023  Date Treatment Plan Last Reviewed/Revised: 4/24/2024    DSM5 Diagnoses:   1. Major depressive disorder, recurrent episode, moderate (H)    2. Adjustment disorder with anxious mood      Psychosocial / Contextual Factors: Patient identified the following stressors/concerns: financial, patient recently lost her job, and parenting stress. Patent report that she is not getting a lot of sleep and reports that she does not always get regular sleep.   PROMIS (reviewed every 90 days): 24    Referral / Collaboration:  Referral to another professional/service is not indicated at this time..    Anticipated number of session for this episode of care: 9-12  sessions  Anticipation frequency of session: Weekly  Anticipated Duration of each session: 38-52 minutes  Treatment plan will be reviewed in 90 days or when goals have been changed.       MeasurableTreatment Goal(s) related to diagnosis / functional impairment(s)  Goal 1: Decrease severity of depression symptoms.       Objective #A (Patient Action)    Patient will Decrease frequency and intensity of feeling down, depressed, hopeless.  Status: New - Date: 7/13/2023  Continued dates: 10/19/2023, 1/18/2024, 4/24/2024    Intervention(s)  Client Centered  CBT  Psycho-dynamic  Internal Family Systems  Psycho-education    Objective #B  Patient will Improve quantity and quality of night time sleep / decrease daytime naps.  Status: New - Date: 7/13/2023  Continued dates: 10/19/2023, 1/18/2024, 4/24/2024    Intervention(s)  Client Centered  CBT  Internal Family Systems  Psycho-education  Mindulness    Objective #C  Patient will Identify negative self-talk and behaviors: challenge core beliefs, myths, and actions.  Status: New - Date: 7/13/2023  Continued dates: 10/19/2023, 1/18/2024, 4/24/2024    Intervention(s)  Client Centered  CBT  Internal Family Systems  Psycho-education      Goal 2: Decrease severity of anxiety related symptoms.       Objective #A (Patient Action)    Status: New - Date: 7/13/2023  Continued dates: 10/19/2023, 1/18/2024, 4/24/2024    Patient will identify triggers/ fears / thoughts that contribute to feeling anxious.    Intervention(s)  Psychodynamic  CBT  Internal Family Systems    Objective #B  Patient will use 1 or more coping skills for anxiety management each week.   Status: New - Date: 7/13/2023  Continued dates: 10/19/2023, 1/18/2024, 4/24/2024    Intervention(s)  Therapist will teach emotional regulation skills. (mindfulness, breathing techniques, progressive muscle relaxation) .  Co-develop short-term goals and review progress in session.   CBT  Internal Family Systems  Mindfulness      Goal 3:  Improve interpersonal relationships.       Objective #A (Patient Action)    Status: New - Date: 7/13/2023  Continued dates: 10/19/2023, 1/18/2024, 4/24/2024    Patient will compile a list of boundaries that they would like to set with others.    Intervention(s)  Client Centered  Motivational Interviewing  Psycho-education  Psychodynamic  Internal Family Systems.     Objective #B  Patient will practice setting boundaries 1 or more times each week.    Status: New - Date: 7/13/2023  Continued dates: 10/19/2023, 1/18/2024, 4/24/2024    Intervention(s)  Client Centered  Motivational Interviewing  Co-develop short-term goals and review progress in session.         Patient has reviewed and agreed to the above plan.    Aidee Conti, [facility] April 24, 2024

## 2024-06-09 ENCOUNTER — HEALTH MAINTENANCE LETTER (OUTPATIENT)
Age: 54
End: 2024-06-09

## 2024-06-19 ENCOUNTER — APPOINTMENT (OUTPATIENT)
Dept: PSYCHOLOGY | Facility: CLINIC | Age: 54
End: 2024-06-19
Payer: COMMERCIAL

## 2024-07-02 ENCOUNTER — VIRTUAL VISIT (OUTPATIENT)
Dept: PSYCHIATRY | Facility: CLINIC | Age: 54
End: 2024-07-02
Payer: COMMERCIAL

## 2024-07-02 DIAGNOSIS — F90.0 ADHD (ATTENTION DEFICIT HYPERACTIVITY DISORDER), INATTENTIVE TYPE: ICD-10-CM

## 2024-07-02 DIAGNOSIS — F41.0 PANIC ATTACK: Primary | ICD-10-CM

## 2024-07-02 PROCEDURE — 99214 OFFICE O/P EST MOD 30 MIN: CPT | Mod: 95 | Performed by: NURSE PRACTITIONER

## 2024-07-02 PROCEDURE — G2211 COMPLEX E/M VISIT ADD ON: HCPCS | Mod: 95 | Performed by: NURSE PRACTITIONER

## 2024-07-02 RX ORDER — LORAZEPAM 0.5 MG/1
0.5 TABLET ORAL DAILY PRN
Qty: 5 TABLET | Refills: 0 | Status: SHIPPED | OUTPATIENT
Start: 2024-07-02

## 2024-07-02 RX ORDER — DEXTROAMPHETAMINE SACCHARATE, AMPHETAMINE ASPARTATE MONOHYDRATE, DEXTROAMPHETAMINE SULFATE AND AMPHETAMINE SULFATE 2.5; 2.5; 2.5; 2.5 MG/1; MG/1; MG/1; MG/1
10 CAPSULE, EXTENDED RELEASE ORAL DAILY
Qty: 30 CAPSULE | Refills: 0 | Status: SHIPPED | OUTPATIENT
Start: 2024-07-02

## 2024-07-02 ASSESSMENT — PAIN SCALES - GENERAL: PAINLEVEL: NO PAIN (0)

## 2024-07-02 NOTE — NURSING NOTE
Current patient location:  Pt will be in her car, in the parking lot of her workplace.     Is the patient currently in the state of [place]? YES    Visit mode:VIDEO    If the visit is dropped, the patient can be reconnected by: VIDEO VISIT: Text to cell phone:   Telephone Information:   Mobile [phone]       Will anyone else be joining the visit? NO  (If patient encounters technical issues they should call [phone]    How would you like to obtain your AVS? MyChart    Are changes needed to the allergy or medication list? No    Are refills needed on medications prescribed by this physician? NO    Reason for visit: RECHECK    Mariann PORTILLO
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
## 2024-07-03 ENCOUNTER — VIRTUAL VISIT (OUTPATIENT)
Dept: PSYCHOLOGY | Facility: CLINIC | Age: 54
End: 2024-07-03
Payer: COMMERCIAL

## 2024-07-03 DIAGNOSIS — F90.0 ADHD (ATTENTION DEFICIT HYPERACTIVITY DISORDER), INATTENTIVE TYPE: Primary | ICD-10-CM

## 2024-07-03 DIAGNOSIS — F43.22 ADJUSTMENT DISORDER WITH ANXIOUS MOOD: ICD-10-CM

## 2024-07-03 DIAGNOSIS — F33.1 MAJOR DEPRESSIVE DISORDER, RECURRENT EPISODE, MODERATE (H): ICD-10-CM

## 2024-07-03 PROCEDURE — 90832 PSYTX W PT 30 MINUTES: CPT | Mod: 95 | Performed by: SOCIAL WORKER

## 2024-07-03 NOTE — PROGRESS NOTES
[facility]                                     Progress Note    Patient Name: Dalia Hart  Date: July 3, 2024         Service Type: Individual      Session Start Time: 3:11 PM Session End Time:   3:46 PM     Session Length: 35 min     Session #: 30    Attendees: Client attended alone    Service Modality: Video Visit via [employer]      Provider verified identity through the following two step process.  Patient provided:  Patient  and Patient is known previously to provider     Telemedicine Visit: The patient's condition can be safely assessed and treated via synchronous audio and visual telemedicine encounter.      Reason for Telemedicine Visit: Patient convenience (e.g. access to timely appointments / distance to available provider)    Originating Site (Patient Location): Patient's other car    Distant Site (Provider Location): Provider Remote Setting- Home Office    Consent:  The patient/guardian has verbally consented to: the potential risks and benefits of telemedicine (video visit) versus in person care; bill my insurance or make self-payment for services provided; and responsibility for payment of non-covered services.   Distant Location (Provider):  On-site    As the provider I attest to compliance with applicable laws and regulations related to telemedicine.    DATA  Interactive Complexity: No  Crisis: No        Progress Since Last Session (Related to Symptoms / Goals / Homework):   Symptoms:  The patient reports and improvement in her interpersonal relationships.  Patient continues to present with ADHD and adjustment disorder related symptoms.    Homework: Achieved / completed to satisfaction      Episode of Care Goals: Satisfactory progress - ACTION (Actively working towards change); Intervened by reinforcing change plan / affirming steps taken     Current / Ongoing Stressors and Concerns:   The patient continues to report and present with: ADHD, depression, adjustment disorder  with anxious mood symptoms. The patient identified the following stressors and concerns: she is preparing to travel in a week, parenting stress, interpersonal stress, difficulty getting rid of things. The patient processed through her thoughts and emotions related to: her upcoming trip to [place], her recent trip to [place], her interpersonal relationships,        Treatment Objective(s) Addressed in This Session:   identify triggers/ fears / thoughts that contribute to feeling anxious  Decrease frequency and intensity of feeling down, depressed, hopeless  Patient will practice setting boundaries 1 or more times each week.  Improve quantity and quality of night time sleep / decrease daytime naps     Intervention:   Client Centered   Psychodynamic   Validation and Normalization   Co-develop short-term goals and review progress in session.    Assessments completed prior to visit: None  The following assessments were completed by patient for this visit: None     ASSESSMENT: Current Emotional / Mental Status (status of significant symptoms):   Risk status (Self / Other harm or suicidal ideation)   Patient denies current fears or concerns for personal safety.   Patient denies current or recent suicidal ideation or behaviors.   Patient denies current or recent homicidal ideation or behaviors.   Patient denies current or recent self injurious behavior or ideation.   Patient denies other safety concerns.   Patient reports there has been no change in risk factors since their last session.     Patient reports there has been no change in protective factors since their last session.     Recommended that patient call 911 or go to the local ED should there be a change in any of these risk factors.     Appearance:   Appropriate    Eye Contact:   Good    Psychomotor Behavior: Normal    Attitude:   Cooperative  Interested Friendly Pleasant Attentive   Orientation:   All   Speech    Rate / Production: Normal/ Responsive    Volume:  Normal     Mood:    Anxious  Normal   Affect:    Appropriate    Thought Content:  Clear    Thought Form:  Coherent  Logical    Insight:    Good      Medication Review:   No changes to current psychiatric medication(s)     Medication Compliance:   Yes     Changes in Health Issues:   None reported     Chemical Use Review:   Substance Use: Chemical use reviewed, no active concerns identified      Tobacco Use: Not addressed in visit.     Diagnosis:  1. ADHD (attention deficit hyperactivity disorder), inattentive type    2. Adjustment disorder with anxious mood    3. Major depressive disorder, recurrent episode, moderate (H)          Collateral Reports Completed:   Not Applicable    PLAN: (Patient Tasks / Therapist Tasks / Other  Patient plans to focus on keeping her parenting commitments.   Patient plans to continue to organize her space - Patient plans to focus on cleaning her living room.   Patient plans to return for follow up: 7/26/2024    Aidee Conti, [facility]                                                      ______________________________________________________________________    Individual Treatment Plan    Patient's Name: Dalia Hart  YOB: 1970    Date of Creation: 7/13/2023  Date Treatment Plan Last Reviewed/Revised: 4/24/2024    DSM5 Diagnoses:   1. Major depressive disorder, recurrent episode, moderate (H)    2. Adjustment disorder with anxious mood      Psychosocial / Contextual Factors: Patient identified the following stressors/concerns: financial, patient recently lost her job, and parenting stress. Patent report that she is not getting a lot of sleep and reports that she does not always get regular sleep.   PROMIS (reviewed every 90 days): 24    Referral / Collaboration:  Referral to another professional/service is not indicated at this time..    Anticipated number of session for this episode of care: 9-12 sessions  Anticipation frequency of session: Weekly  Anticipated Duration of  each session: 38-52 minutes  Treatment plan will be reviewed in 90 days or when goals have been changed.       MeasurableTreatment Goal(s) related to diagnosis / functional impairment(s)  Goal 1: Decrease severity of depression symptoms.       Objective #A (Patient Action)    Patient will Decrease frequency and intensity of feeling down, depressed, hopeless.  Status: New - Date: 7/13/2023  Continued dates: 10/19/2023, 1/18/2024, 4/24/2024    Intervention(s)  Client Centered  CBT  Psycho-dynamic  Internal Family Systems  Psycho-education    Objective #B  Patient will Improve quantity and quality of night time sleep / decrease daytime naps.  Status: New - Date: 7/13/2023  Continued dates: 10/19/2023, 1/18/2024, 4/24/2024    Intervention(s)  Client Centered  CBT  Internal Family Systems  Psycho-education  Mindulness    Objective #C  Patient will Identify negative self-talk and behaviors: challenge core beliefs, myths, and actions.  Status: New - Date: 7/13/2023  Continued dates: 10/19/2023, 1/18/2024, 4/24/2024    Intervention(s)  Client Centered  CBT  Internal Family Systems  Psycho-education      Goal 2: Decrease severity of anxiety related symptoms.       Objective #A (Patient Action)    Status: New - Date: 7/13/2023  Continued dates: 10/19/2023, 1/18/2024, 4/24/2024    Patient will identify triggers/ fears / thoughts that contribute to feeling anxious.    Intervention(s)  Psychodynamic  CBT  Internal Family Systems    Objective #B  Patient will use 1 or more coping skills for anxiety management each week.   Status: New - Date: 7/13/2023  Continued dates: 10/19/2023, 1/18/2024, 4/24/2024    Intervention(s)  Therapist will teach emotional regulation skills. (mindfulness, breathing techniques, progressive muscle relaxation) .  Co-develop short-term goals and review progress in session.   CBT  Internal Family Systems  Mindfulness      Goal 3: Improve interpersonal relationships.       Objective #A (Patient  Action)    Status: New - Date: 7/13/2023  Continued dates: 10/19/2023, 1/18/2024, 4/24/2024    Patient will compile a list of boundaries that they would like to set with others.    Intervention(s)  Client Centered  Motivational Interviewing  Psycho-education  Psychodynamic  Internal Family Systems.     Objective #B  Patient will practice setting boundaries 1 or more times each week.    Status: New - Date: 7/13/2023  Continued dates: 10/19/2023, 1/18/2024, 4/24/2024    Intervention(s)  Client Centered  Motivational Interviewing  Co-develop short-term goals and review progress in session.         Patient has reviewed and agreed to the above plan.    Aidee Conti, [facility] April 24, 2024

## 2024-07-26 ENCOUNTER — VIRTUAL VISIT (OUTPATIENT)
Dept: PSYCHOLOGY | Facility: CLINIC | Age: 54
End: 2024-07-26
Payer: COMMERCIAL

## 2024-07-26 DIAGNOSIS — F43.22 ADJUSTMENT DISORDER WITH ANXIOUS MOOD: ICD-10-CM

## 2024-07-26 DIAGNOSIS — F90.0 ADHD (ATTENTION DEFICIT HYPERACTIVITY DISORDER), INATTENTIVE TYPE: Primary | ICD-10-CM

## 2024-07-26 PROCEDURE — 90832 PSYTX W PT 30 MINUTES: CPT | Mod: 95 | Performed by: SOCIAL WORKER

## 2024-07-26 NOTE — PROGRESS NOTES
[facility]                                     Progress Note    Patient Name: Dalia Hart  Date: 2024       Service Type: Individual      Session Start Time: 1:31 PM Session End Time:   2:04 PM     Session Length: 33 min    Session #: 31    Attendees: Client attended alone    Service Modality: Video Visit via [employer]      Provider verified identity through the following two step process.  Patient provided:  Patient  and Patient is known previously to provider     Telemedicine Visit: The patient's condition can be safely assessed and treated via synchronous audio and visual telemedicine encounter.      Reason for Telemedicine Visit: Patient convenience (e.g. access to timely appointments / distance to available provider)    Originating Site (Patient Location): Patient's other car    Distant Site (Provider Location): Provider Remote Setting- Home Office    Consent:  The patient/guardian has verbally consented to: the potential risks and benefits of telemedicine (video visit) versus in person care; bill my insurance or make self-payment for services provided; and responsibility for payment of non-covered services.   Distant Location (Provider):  On-site    As the provider I attest to compliance with applicable laws and regulations related to telemedicine.    DATA  Interactive Complexity: No  Crisis: No        Progress Since Last Session (Related to Symptoms / Goals / Homework):   Symptoms:  The patient reports improvement in her interpersonal relationships.  Patient continues to present with ADHD and adjustment disorder related symptoms.    Homework: Achieved / completed to satisfaction      Episode of Care Goals: Satisfactory progress - ACTION (Actively working towards change); Intervened by reinforcing change plan / affirming steps taken     Current / Ongoing Stressors and Concerns:   The patient continues to report and present with: ADHD, depression, adjustment disorder with  anxious mood symptoms. The patient identified the following stressors and concerns: work related stress, financial stress, and parenting stress.   The patient processed through her thoughts and emotions related to: her recent trip to [place], work/her jobs, and current life circumstances.      Treatment Objective(s) Addressed in This Session:   identify triggers/ fears / thoughts that contribute to feeling anxious  Decrease frequency and intensity of feeling down, depressed, hopeless  Patient will practice setting boundaries 1 or more times each week.     Intervention:   Client Centered   Psychodynamic   Validation and Normalization   Co-develop short-term goals and review progress in session.   Motivational Interviewing    Assessments completed prior to visit: None  The following assessments were completed by patient for this visit: None     ASSESSMENT: Current Emotional / Mental Status (status of significant symptoms):   Risk status (Self / Other harm or suicidal ideation)   Patient denies current fears or concerns for personal safety.   Patient denies current or recent suicidal ideation or behaviors.   Patient denies current or recent homicidal ideation or behaviors.   Patient denies current or recent self injurious behavior or ideation.   Patient denies other safety concerns.   Patient reports there has been no change in risk factors since their last session.     Patient reports there has been no change in protective factors since their last session.     Recommended that patient call 911 or go to the local ED should there be a change in any of these risk factors.     Appearance:   Appropriate    Eye Contact:   Good    Psychomotor Behavior: Normal    Attitude:   Cooperative  Interested Friendly Pleasant Attentive   Orientation:   All   Speech    Rate / Production: Normal/ Responsive    Volume:  Normal    Mood:    Anxious  Normal   Affect:    Appropriate    Thought Content:  Clear    Thought Form:  Coherent  Logical     Insight:    Good      Medication Review:   No changes to current psychiatric medication(s)     Medication Compliance:   Yes     Changes in Health Issues:   None reported     Chemical Use Review:   Substance Use: Chemical use reviewed, no active concerns identified      Tobacco Use: Not addressed in visit.     Diagnosis:  1. ADHD (attention deficit hyperactivity disorder), inattentive type    2. Adjustment disorder with anxious mood        Collateral Reports Completed:   Not Applicable    PLAN: (Patient Tasks / Therapist Tasks / Other  Patient plans to continue to organize her space - Patient plans to focus on cleaning her living room.   Patient plans to return for follow up: 8/13/2024    Next session update treatment plan: Treatment plan not updated in today's session due to length of session time.     Aidee Conti, [facility]                                                      ______________________________________________________________________    Individual Treatment Plan    Patient's Name: Dalia Hart  YOB: 1970    Date of Creation: 7/13/2023  Date Treatment Plan Last Reviewed/Revised: 4/24/2024     DSM5 Diagnoses:   1. Major depressive disorder, recurrent episode, moderate (H)    2. Adjustment disorder with anxious mood      Psychosocial / Contextual Factors: Patient identified the following stressors/concerns: financial, patient recently lost her job, and parenting stress. Patent report that she is not getting a lot of sleep and reports that she does not always get regular sleep.   PROMIS (reviewed every 90 days): 24    Referral / Collaboration:  Referral to another professional/service is not indicated at this time..    Anticipated number of session for this episode of care: 9-12 sessions  Anticipation frequency of session: Weekly  Anticipated Duration of each session: 38-52 minutes  Treatment plan will be reviewed in 90 days or when goals have been changed.       MeasurableTreatment  Goal(s) related to diagnosis / functional impairment(s)  Goal 1: Decrease severity of depression symptoms.       Objective #A (Patient Action)    Patient will Decrease frequency and intensity of feeling down, depressed, hopeless.  Status: New - Date: 7/13/2023  Continued dates: 10/19/2023, 1/18/2024, 4/24/2024    Intervention(s)  Client Centered  CBT  Psycho-dynamic  Internal Family Systems  Psycho-education    Objective #B  Patient will Improve quantity and quality of night time sleep / decrease daytime naps.  Status: New - Date: 7/13/2023  Continued dates: 10/19/2023, 1/18/2024, 4/24/2024    Intervention(s)  Client Centered  CBT  Internal Family Systems  Psycho-education  Mindulness    Objective #C  Patient will Identify negative self-talk and behaviors: challenge core beliefs, myths, and actions.  Status: New - Date: 7/13/2023  Continued dates: 10/19/2023, 1/18/2024, 4/24/2024    Intervention(s)  Client Centered  CBT  Internal Family Systems  Psycho-education      Goal 2: Decrease severity of anxiety related symptoms.       Objective #A (Patient Action)    Status: New - Date: 7/13/2023  Continued dates: 10/19/2023, 1/18/2024, 4/24/2024    Patient will identify triggers/ fears / thoughts that contribute to feeling anxious.    Intervention(s)  Psychodynamic  CBT  Internal Family Systems    Objective #B  Patient will use 1 or more coping skills for anxiety management each week.   Status: New - Date: 7/13/2023  Continued dates: 10/19/2023, 1/18/2024, 4/24/2024    Intervention(s)  Therapist will teach emotional regulation skills. (mindfulness, breathing techniques, progressive muscle relaxation) .  Co-develop short-term goals and review progress in session.   CBT  Internal Family Systems  Mindfulness      Goal 3: Improve interpersonal relationships.       Objective #A (Patient Action)    Status: New - Date: 7/13/2023  Continued dates: 10/19/2023, 1/18/2024, 4/24/2024    Patient will compile a list of boundaries that  they would like to set with others.    Intervention(s)  Client Centered  Motivational Interviewing  Psycho-education  Psychodynamic  Internal Family Systems.     Objective #B  Patient will practice setting boundaries 1 or more times each week.    Status: New - Date: 7/13/2023  Continued dates: 10/19/2023, 1/18/2024, 4/24/2024    Intervention(s)  Client Centered  Motivational Interviewing  Co-develop short-term goals and review progress in session.         Patient has reviewed and agreed to the above plan.    Aidee Conti, [facility] April 24, 2024

## 2024-08-01 ENCOUNTER — OFFICE VISIT (OUTPATIENT)
Dept: URGENT CARE | Facility: URGENT CARE | Age: 54
End: 2024-08-01
Payer: COMMERCIAL

## 2024-08-01 VITALS — HEART RATE: 60 BPM | TEMPERATURE: 97.3 F | DIASTOLIC BLOOD PRESSURE: 82 MMHG | SYSTOLIC BLOOD PRESSURE: 118 MMHG

## 2024-08-01 DIAGNOSIS — N94.9 VAGINAL DISCOMFORT: ICD-10-CM

## 2024-08-01 DIAGNOSIS — N30.00 ACUTE CYSTITIS WITHOUT HEMATURIA: Primary | ICD-10-CM

## 2024-08-01 DIAGNOSIS — R30.0 DYSURIA: ICD-10-CM

## 2024-08-01 LAB
ALBUMIN UR-MCNC: NEGATIVE MG/DL
APPEARANCE UR: CLEAR
BACTERIA #/AREA URNS HPF: ABNORMAL /HPF
BILIRUB UR QL STRIP: NEGATIVE
CLUE CELLS: ABNORMAL
COLOR UR AUTO: YELLOW
GLUCOSE UR STRIP-MCNC: NEGATIVE MG/DL
HGB UR QL STRIP: ABNORMAL
KETONES UR STRIP-MCNC: NEGATIVE MG/DL
LEUKOCYTE ESTERASE UR QL STRIP: ABNORMAL
NITRATE UR QL: POSITIVE
PH UR STRIP: 6 [PH] (ref 5–7)
RBC #/AREA URNS AUTO: ABNORMAL /HPF
SP GR UR STRIP: 1.02 (ref 1–1.03)
SQUAMOUS #/AREA URNS AUTO: ABNORMAL /LPF
TRICHOMONAS, WET PREP: ABNORMAL
UROBILINOGEN UR STRIP-ACNC: 0.2 E.U./DL
WBC #/AREA URNS AUTO: ABNORMAL /HPF
WBC'S/HIGH POWER FIELD, WET PREP: ABNORMAL
YEAST, WET PREP: ABNORMAL

## 2024-08-01 PROCEDURE — 87210 SMEAR WET MOUNT SALINE/INK: CPT | Performed by: PHYSICIAN ASSISTANT

## 2024-08-01 PROCEDURE — 87086 URINE CULTURE/COLONY COUNT: CPT | Performed by: PHYSICIAN ASSISTANT

## 2024-08-01 PROCEDURE — 81001 URINALYSIS AUTO W/SCOPE: CPT | Performed by: PHYSICIAN ASSISTANT

## 2024-08-01 PROCEDURE — 87491 CHLMYD TRACH DNA AMP PROBE: CPT | Performed by: PHYSICIAN ASSISTANT

## 2024-08-01 PROCEDURE — 87591 N.GONORRHOEAE DNA AMP PROB: CPT | Performed by: PHYSICIAN ASSISTANT

## 2024-08-01 PROCEDURE — 87186 SC STD MICRODIL/AGAR DIL: CPT | Performed by: PHYSICIAN ASSISTANT

## 2024-08-01 PROCEDURE — 99213 OFFICE O/P EST LOW 20 MIN: CPT | Performed by: PHYSICIAN ASSISTANT

## 2024-08-01 RX ORDER — CEFDINIR 300 MG/1
300 CAPSULE ORAL 2 TIMES DAILY
Qty: 14 CAPSULE | Refills: 0 | Status: SHIPPED | OUTPATIENT
Start: 2024-08-01 | End: 2024-08-08

## 2024-08-01 NOTE — PROGRESS NOTES
Dysuria  - UA Macroscopic with reflex to Microscopic and Culture - Clinic Collect  - Chlamydia trachomatis/Neisseria gonorrhoeae by PCR - Clinic Collect  - Urine Microscopic Exam  - Urine Culture    Vaginal discomfort  - Wet prep - Clinic Collect  - Chlamydia trachomatis/Neisseria gonorrhoeae by PCR - Clinic Collect    Acute cystitis without hematuria  - cefdinir (OMNICEF) 300 MG capsule; Take 1 capsule (300 mg) by mouth 2 times daily for 7 days    General Tips for All Ages:    Hydration:  Why: Drinking plenty of water helps flush out bacteria from the urinary system.  What to Do: Aim for at least 8 glasses of water per day.    Cranberry Juice:  Why: Some studies suggest cranberry juice may help prevent bacterial adherence in the urinary tract.  What to Do: Drink pure, unsweetened cranberry juice. Limit added sugars.    For Adults (18 Years and Older):    Over-the-Counter (OTC) Pain Relievers:  Why: Relieves discomfort and pain.  What to Use: Ibuprofen or acetaminophen as directed on the package.    Urinary Alkalinizers (if prescribed):  Why: Alters the acidity of the urine, providing relief.  What to Do: Take as prescribed by your healthcare provider.    Antibiotics (if prescribed):  Why: Eliminates the bacterial infection.  What to Do: Take the full course of antibiotics as directed, even if symptoms improve.    For Adolescents (12-17 Years):    OTC Pain Relievers:  Why: Manages pain and discomfort.  What to Use: Ibuprofen or acetaminophen following package instructions.    Hydration:  Why: Helps flush out bacteria.  What to Do: Drink plenty of water; avoid sugary drinks.    Seek Medical Attention:  When: If symptoms persist or worsen.  What to Do: Contact your healthcare provider for further evaluation.    For Children (Under 12 Years):    Hydration:  Why: Promotes urinary system flushing.  What to Do: Encourage your child to drink water regularly.    Avoid Irritants:  Why: Certain soaps or bubble baths can  worsen symptoms.  What to Do: Use gentle, fragrance-free products for bathing.    OTC Pain Relievers (if approved by pediatrician):  Why: Eases pain and discomfort.  What to Use: Follow your pediatrician's advice on using ibuprofen or acetaminophen.    Seek Medical Attention:  When: If symptoms persist or if your child has a high fever.  What to Do: Contact your child's pediatrician for guidance.    All Ages:    Probiotics:  Why: May help restore healthy bacteria in the gut.  What to Do: Consider including probiotic-rich foods or supplements.    Avoid Irritants:  Why: Certain foods and drinks can worsen symptoms.  What to Do: Limit caffeine, spicy foods, and alcohol during the infection.    Follow-up:    Patient advised to return to clinic for reevaluation (either UC or PCP) if symptoms do not improve in 7 days. Patient educated on red flag symptoms and asked to go directly to the ED if these symptoms present themselves.     Remember, individual cases may vary, and it's crucial to follow your healthcare provider's advice. If you have concerns or if symptoms persist, don't hesitate to reach out for further guidance.    Wishing you a schwartz recovery!      SHIVANI Hannah [facility]    Subjective   53 year old who presents to clinic today for the following health issues:    Urgent Care       HPI     Genitourinary - Female  Onset/Duration: 3-4 days   Description:   Painful urination (Dysuria): No           Frequency: YES  Blood in urine (Hematuria): No but darker and cloudier than usual   Delay in urine (Hesitency): No  Intensity: moderate  Progression of Symptoms:  worsening  Accompanying Signs & Symptoms:  Fever/chills: No  Flank pain: No  Nausea and vomiting: No  Vaginal symptoms: odor and itching  Abdominal/Pelvic Pain: No  History:   History of frequent UTI s: YES  History of kidney stones: No  Sexually Active: YES  Possibility of pregnancy: No  Precipitating or alleviating factors:  None  Therapies tried and outcome: None     Review of Systems   Review of Systems   See HPI    Objective    Temp: 97.3  F (36.3  C) Temp src: Tympanic BP: 118/82 Pulse: 60             Physical Exam   Physical Exam  Constitutional:       General: She is not in acute distress.     Appearance: Normal appearance. She is normal weight. She is not ill-appearing, toxic-appearing or diaphoretic.   HENT:      Head: Normocephalic and atraumatic.   Cardiovascular:      Rate and Rhythm: Normal rate.      Pulses: Normal pulses.   Pulmonary:      Effort: Pulmonary effort is normal. No respiratory distress.   Abdominal:      Tenderness: There is no right CVA tenderness or left CVA tenderness.   Neurological:      General: No focal deficit present.      Mental Status: She is alert and oriented to person, place, and time. Mental status is at baseline.      Gait: Gait normal.   Psychiatric:         Mood and Affect: Mood normal.         Behavior: Behavior normal.         Thought Content: Thought content normal.         Judgment: Judgment normal.          Results for orders placed or performed in visit on 08/01/24 (from the past 24 hour(s))   UA Macroscopic with reflex to Microscopic and Culture - Clinic Collect    Specimen: Urine, Clean Catch   Result Value Ref Range    Color Urine Yellow Colorless, Straw, Light Yellow, Yellow    Appearance Urine Clear Clear    Glucose Urine Negative Negative mg/dL    Bilirubin Urine Negative Negative    Ketones Urine Negative Negative mg/dL    Specific Gravity Urine 1.025 1.003 - 1.035    Blood Urine Trace (A) Negative    pH Urine 6.0 5.0 - 7.0    Protein Albumin Urine Negative Negative mg/dL    Urobilinogen Urine 0.2 0.2, 1.0 E.U./dL    Nitrite Urine Positive (A) Negative    Leukocyte Esterase Urine Small (A) Negative   Wet prep - Clinic Collect    Specimen: Vagina; Swab   Result Value Ref Range    Trichomonas Absent Absent    Yeast Absent Absent    Clue Cells Absent Absent    WBCs/high power field  1+ (A) None   Urine Microscopic Exam   Result Value Ref Range    Bacteria Urine Many (A) None Seen /HPF    RBC Urine 2-5 (A) 0-2 /HPF /HPF    WBC Urine 25-50 (A) 0-5 /HPF /HPF    Squamous Epithelials Urine Moderate (A) None Seen /LPF

## 2024-08-02 LAB
C TRACH DNA SPEC QL PROBE+SIG AMP: NEGATIVE
N GONORRHOEA DNA SPEC QL NAA+PROBE: NEGATIVE

## 2024-08-03 LAB — BACTERIA UR CULT: ABNORMAL

## 2024-08-13 ENCOUNTER — TELEPHONE (OUTPATIENT)
Dept: INTERNAL MEDICINE | Facility: CLINIC | Age: 54
End: 2024-08-13
Payer: COMMERCIAL

## 2024-08-13 ENCOUNTER — VIRTUAL VISIT (OUTPATIENT)
Dept: PSYCHOLOGY | Facility: CLINIC | Age: 54
End: 2024-08-13
Payer: COMMERCIAL

## 2024-08-13 DIAGNOSIS — F33.1 MAJOR DEPRESSIVE DISORDER, RECURRENT EPISODE, MODERATE (H): ICD-10-CM

## 2024-08-13 DIAGNOSIS — F43.22 ADJUSTMENT DISORDER WITH ANXIOUS MOOD: ICD-10-CM

## 2024-08-13 DIAGNOSIS — F90.0 ADHD (ATTENTION DEFICIT HYPERACTIVITY DISORDER), INATTENTIVE TYPE: Primary | ICD-10-CM

## 2024-08-13 PROCEDURE — 90837 PSYTX W PT 60 MINUTES: CPT | Mod: 95 | Performed by: SOCIAL WORKER

## 2024-08-13 ASSESSMENT — COLUMBIA-SUICIDE SEVERITY RATING SCALE - C-SSRS
TOTAL  NUMBER OF INTERRUPTED ATTEMPTS SINCE LAST CONTACT: NO
2. HAVE YOU ACTUALLY HAD ANY THOUGHTS OF KILLING YOURSELF?: NO
1. SINCE LAST CONTACT, HAVE YOU WISHED YOU WERE DEAD OR WISHED YOU COULD GO TO SLEEP AND NOT WAKE UP?: NO
TOTAL  NUMBER OF ABORTED OR SELF INTERRUPTED ATTEMPTS SINCE LAST CONTACT: NO
SUICIDE, SINCE LAST CONTACT: NO
ATTEMPT SINCE LAST CONTACT: NO
6. HAVE YOU EVER DONE ANYTHING, STARTED TO DO ANYTHING, OR PREPARED TO DO ANYTHING TO END YOUR LIFE?: NO

## 2024-08-13 NOTE — PROGRESS NOTES
[facility]                                     Progress Note    Patient Name: Dalia Hart  Date: 2024         Service Type: Individual      Session Start Time: 2:02 PM Session End Time:  3:03 PM     Session Length: 53+ min (Session was 53+ minutes in length due to: content of session, progress review, short-term goal setting, and treatment plan update)    Session #: 32    Attendees: Client attended alone    Service Modality: Video Visit via Vaurum      Provider verified identity through the following two step process.  Patient provided:  Patient  and Patient is known previously to provider     Telemedicine Visit: The patient's condition can be safely assessed and treated via synchronous audio and visual telemedicine encounter.      Reason for Telemedicine Visit: Patient convenience (e.g. access to timely appointments / distance to available provider)    Originating Site (Patient Location): Patient's other car    Distant Site (Provider Location): Provider Remote Setting- Home Office    Consent:  The patient/guardian has verbally consented to: the potential risks and benefits of telemedicine (video visit) versus in person care; bill my insurance or make self-payment for services provided; and responsibility for payment of non-covered services.   Distant Location (Provider):  On-site    As the provider I attest to compliance with applicable laws and regulations related to telemedicine.    DATA  Interactive Complexity: No  Crisis: No        Progress Since Last Session (Related to Symptoms / Goals / Homework):   Symptoms:  No change in symptoms reported  Patient continues to present with ADHD and adjustment disorder related symptoms.    Homework: Achieved / completed to satisfaction      Episode of Care Goals: Satisfactory progress - ACTION (Actively working towards change); Intervened by reinforcing change plan / affirming steps taken     Current / Ongoing Stressors and  "Concerns:   The patient continues to report and present with: ADHD, depression, adjustment disorder with anxious mood symptoms. The patient identified the following stressors and concerns: \"post-trip-depression\", work-related stress, interpersonal stress, the patient reports her \"house is a disaster\", and she is having a hard time getting rid of things. The patient processed through her thoughts and emotions related to: work, her interpersonal relationships, and her interpersonal interactions. The therapist and patient discussed barriers to cleaning and organizing and discussed strategies to overcome identified barriers.        Treatment Objective(s) Addressed in This Session:   Identify triggers/ fears / thoughts that contribute to feeling anxious  Decrease frequency and intensity of feeling down, depressed, hopeless  Identify negative self-talk and behaviors: challenge core beliefs, myths, and action       Intervention:   Client Centered   Psychodynamic   Validation and Normalization   Co-develop short-term goals and review progress in session.   Motivational Interviewing    Assessments completed prior to visit: None  The following assessments were completed by patient for this visit: [place] Since Last Contact     ASSESSMENT: Current Emotional / Mental Status (status of significant symptoms):   Risk status (Self / Other harm or suicidal ideation)   Patient denies current fears or concerns for personal safety.   Patient denies current or recent suicidal ideation or behaviors.   Patient denies current or recent homicidal ideation or behaviors.   Patient denies current or recent self injurious behavior or ideation.   Patient denies other safety concerns.   Patient reports there has been no change in risk factors since their last session.     Patient reports there has been no change in protective factors since their last session.     Recommended that patient call 911 or go to the local ED should there be a change in " any of these risk factors.     Appearance:   Appropriate    Eye Contact:   Good    Psychomotor Behavior: Normal    Attitude:   Cooperative  Interested Friendly Pleasant Attentive   Orientation:   All   Speech    Rate / Production: Normal/ Responsive    Volume:  Normal    Mood:    Normal   Affect:    Appropriate    Thought Content:  Clear    Thought Form:  Coherent  Logical    Insight:    Good      Medication Review:   No changes to current psychiatric medication(s)     Medication Compliance:   Yes     Changes in Health Issues:   None reported     Chemical Use Review:   Substance Use: Chemical use reviewed, no active concerns identified      Tobacco Use: Not addressed in visit.     Diagnosis:  1. ADHD (attention deficit hyperactivity disorder), inattentive type    2. Adjustment disorder with anxious mood    3. Major depressive disorder, recurrent episode, moderate (H)      Collateral Reports Completed:   Not Applicable    PLAN: (Patient Tasks / Therapist Tasks / Other  Patient plans to take things day by day.   Patient plans to continue to organize her space   Patient plans to return for follow up: 8/21/2024      Aidee Conti, [facility]                                                      ______________________________________________________________________    Individual Treatment Plan    Patient's Name: Dalia Hart  YOB: 1970    Date of Creation: 7/13/2023  Date Treatment Plan Last Reviewed/Revised: August 13, 2024      DSM5 Diagnoses:   1. Major depressive disorder, recurrent episode, moderate (H)    2. Adjustment disorder with anxious mood      Psychosocial / Contextual Factors: Patient identified the following stressors/concerns: financial, patient recently lost her job, and parenting stress. Patent report that she is not getting a lot of sleep and reports that she does not always get regular sleep.   PROMIS (reviewed every 90 days): 25    Referral / Collaboration:  Referral to another  professional/service is not indicated at this time..    Anticipated number of session for this episode of care: 9-12 sessions  Anticipation frequency of session: Weekly  Anticipated Duration of each session: 38-52 minutes  Treatment plan will be reviewed in 90 days or when goals have been changed.       MeasurableTreatment Goal(s) related to diagnosis / functional impairment(s)  Goal 1: Decrease severity of depression symptoms.       Objective #A (Patient Action)    Patient will Decrease frequency and intensity of feeling down, depressed, hopeless.  Status: New - Date: 7/13/2023  Continued dates: 10/19/2023, 1/18/2024, 4/24/2024, 8/13/2024    Intervention(s)  Client Centered  CBT  Psycho-dynamic  Internal Family Systems  Psycho-education    Objective #B  Patient will Improve quantity and quality of night time sleep / decrease daytime naps.  Status: New - Date: 7/13/2023  Continued dates: 10/19/2023, 1/18/2024, 4/24/2024, 8/13/2024    Intervention(s)  Client Centered  CBT  Internal Family Systems  Psycho-education  Mindulness    Objective #C  Patient will Identify negative self-talk and behaviors: challenge core beliefs, myths, and actions.  Status: New - Date: 7/13/2023  Continued dates: 10/19/2023, 1/18/2024, 4/24/2024, 8/13/2024    Intervention(s)  Client Centered  CBT  Internal Family Systems  Psycho-education      Goal 2: Decrease severity of anxiety related symptoms.       Objective #A (Patient Action)    Status: New - Date: 7/13/2023  Continued dates: 10/19/2023, 1/18/2024, 4/24/2024, 8/13/2024    Patient will identify triggers/ fears / thoughts that contribute to feeling anxious.    Intervention(s)  Psychodynamic  CBT  Internal Family Systems    Objective #B  Patient will use 1 or more coping skills for anxiety management each week.   Status: New - Date: 7/13/2023  Continued dates: 10/19/2023, 1/18/2024, 4/24/2024, 8/13/2024    Intervention(s)  Therapist will teach emotional regulation skills. (mindfulness,  breathing techniques, progressive muscle relaxation) .  Co-develop short-term goals and review progress in session.   CBT  Internal Family Systems  Mindfulness      Goal 3: Improve interpersonal relationships.       Objective #A (Patient Action)    Status: New - Date: 7/13/2023  Continued dates: 10/19/2023, 1/18/2024, 4/24/2024, 8/13/2024    Patient will compile a list of boundaries that they would like to set with others.    Intervention(s)  Client Centered  Motivational Interviewing  Psycho-education  Psychodynamic  Internal Family Systems.     Objective #B  Patient will practice setting boundaries 1 or more times each week.    Status: New - Date: 7/13/2023  Continued dates: 10/19/2023, 1/18/2024, 4/24/2024, 8/13/2024    Intervention(s)  Client Centered  Motivational Interviewing  Co-develop short-term goals and review progress in session.         Patient has reviewed and agreed to the above plan.      Aidee Conti, [facility] August 13, 2024

## 2024-08-13 NOTE — TELEPHONE ENCOUNTER
Patient contacted stating that she had a recent visit to urgent care on August 1 for UTI symptoms and vaginal discomfort. She received an antibiotic but not an antifungal. Is now requesting Diflucan.      Writer informed patient that she would need to schedule another visit with a provider to request a newprescription, as it has been 12 days since her urgent care visit.  Patient states that she will plan to visit urgent care.

## 2024-08-21 ENCOUNTER — VIRTUAL VISIT (OUTPATIENT)
Dept: PSYCHOLOGY | Facility: CLINIC | Age: 54
End: 2024-08-21
Payer: COMMERCIAL

## 2024-08-21 DIAGNOSIS — F33.1 MAJOR DEPRESSIVE DISORDER, RECURRENT EPISODE, MODERATE (H): ICD-10-CM

## 2024-08-21 DIAGNOSIS — F90.0 ADHD (ATTENTION DEFICIT HYPERACTIVITY DISORDER), INATTENTIVE TYPE: Primary | ICD-10-CM

## 2024-08-21 DIAGNOSIS — F43.22 ADJUSTMENT DISORDER WITH ANXIOUS MOOD: ICD-10-CM

## 2024-08-21 PROCEDURE — 90834 PSYTX W PT 45 MINUTES: CPT | Mod: 95 | Performed by: SOCIAL WORKER

## 2024-08-21 NOTE — PROGRESS NOTES
[facility]                                     Progress Note    Patient Name: Dalia Hart  Date: 2024       Service Type: Individual      Session Start Time: 1:11 PM Session End Time:  1:49 :PM     Session Length: 38 min     Session #: 33    Attendees: Client attended alone    Service Modality: Video Visit via InComm      Provider verified identity through the following two step process.  Patient provided:  Patient  and Patient is known previously to provider     Telemedicine Visit: The patient's condition can be safely assessed and treated via synchronous audio and visual telemedicine encounter.      Reason for Telemedicine Visit: Patient convenience (e.g. access to timely appointments / distance to available provider)    Originating Site (Patient Location): Patient's other car    Distant Site (Provider Location): Provider Remote Setting- Home Office    Consent:  The patient/guardian has verbally consented to: the potential risks and benefits of telemedicine (video visit) versus in person care; bill my insurance or make self-payment for services provided; and responsibility for payment of non-covered services.   Distant Location (Provider):  On-site    As the provider I attest to compliance with applicable laws and regulations related to telemedicine.    DATA  Interactive Complexity: No  Crisis: No        Progress Since Last Session (Related to Symptoms / Goals / Homework):   Symptoms:  No change in symptoms reported  Patient continues to present with ADHD and adjustment disorder related symptoms.    Homework: Achieved / completed to satisfaction      Episode of Care Goals: Satisfactory progress - ACTION (Actively working towards change); Intervened by reinforcing change plan / affirming steps taken     Current / Ongoing Stressors and Concerns:   The patient continues to report and present with: ADHD, depression, adjustment disorder with anxious mood symptoms. The patient  identified the following stressors and concerns: parenting stress, financial stress, work-related stress and interpersonal stress.     The patient processed through her thoughts and emotions related to current stressors.     The therapist and patient continue to discuss barriers to cleaning and organizing and strategies to overcome identified barriers.     Therapist and patient also discussed the importance of getting adequate sleep       Treatment Objective(s) Addressed in This Session:   Identify triggers/ fears / thoughts that contribute to feeling anxious  Decrease frequency and intensity of feeling down, depressed, hopeless  Improve quantity and quality of night time sleep / decrease daytime naps       Intervention:   Client Centered   Psychodynamic - processed through internal experiences   Validation and Normalization   Co-develop short-term goals and review progress in session.   Motivational Interviewing    Assessments completed prior to visit: None  The following assessments were completed by patient for this visit: None     ASSESSMENT: Current Emotional / Mental Status (status of significant symptoms):   Risk status (Self / Other harm or suicidal ideation)   Patient denies current fears or concerns for personal safety.   Patient denies current or recent suicidal ideation or behaviors.   Patient denies current or recent homicidal ideation or behaviors.   Patient denies current or recent self injurious behavior or ideation.   Patient denies other safety concerns.   Patient reports there has been no change in risk factors since their last session.     Patient reports there has been no change in protective factors since their last session.     Recommended that patient call 911 or go to the local ED should there be a change in any of these risk factors.     Appearance:   Appropriate    Eye Contact:   Good    Psychomotor Behavior: Normal    Attitude:   Cooperative  Interested Friendly Pleasant  Attentive   Orientation:   All   Speech    Rate / Production: Normal/ Responsive    Volume:  Normal    Mood:    Normal   Affect:    Appropriate    Thought Content:  Clear    Thought Form:  Coherent  Logical    Insight:    Good      Medication Review:   No changes to current psychiatric medication(s)     Medication Compliance:   Yes     Changes in Health Issues:   None reported     Chemical Use Review:   Substance Use: Chemical use reviewed, no active concerns identified      Tobacco Use: Not addressed in visit.     Diagnosis:  1. ADHD (attention deficit hyperactivity disorder), inattentive type    2. Adjustment disorder with anxious mood    3. Major depressive disorder, recurrent episode, moderate (H)        Collateral Reports Completed:   Not Applicable    PLAN: (Patient Tasks / Therapist Tasks / Other  Patient plans [place] enjoy the [place] fair.   Patient plans to continue to organize her space   Patient plans to return for follow up: 9/20/2024      Aidee Conti, [facility]                                                      ______________________________________________________________________    Individual Treatment Plan    Patient's Name: Dalia Hart  YOB: 1970    Date of Creation: 7/13/2023    Date Treatment Plan Last Reviewed/Revised: August 13, 2024      DSM5 Diagnoses:   1. Major depressive disorder, recurrent episode, moderate (H)    2. Adjustment disorder with anxious mood      Psychosocial / Contextual Factors: Patient identified the following stressors/concerns: financial, patient recently lost her job, and parenting stress. Patent report that she is not getting a lot of sleep and reports that she does not always get regular sleep.   PROMIS (reviewed every 90 days): 25    Referral / Collaboration:  Referral to another professional/service is not indicated at this time..    Anticipated number of session for this episode of care: 9-12 sessions  Anticipation frequency of session:  Weekly  Anticipated Duration of each session: 38-52 minutes  Treatment plan will be reviewed in 90 days or when goals have been changed.       MeasurableTreatment Goal(s) related to diagnosis / functional impairment(s)  Goal 1: Decrease severity of depression symptoms.       Objective #A (Patient Action)    Patient will Decrease frequency and intensity of feeling down, depressed, hopeless.  Status: New - Date: 7/13/2023  Continued dates: 10/19/2023, 1/18/2024, 4/24/2024, 8/13/2024    Intervention(s)  Client Centered  CBT  Psycho-dynamic  Internal Family Systems  Psycho-education    Objective #B  Patient will Improve quantity and quality of night time sleep / decrease daytime naps.  Status: New - Date: 7/13/2023  Continued dates: 10/19/2023, 1/18/2024, 4/24/2024, 8/13/2024    Intervention(s)  Client Centered  CBT  Internal Family Systems  Psycho-education  Mindulness    Objective #C  Patient will Identify negative self-talk and behaviors: challenge core beliefs, myths, and actions.  Status: New - Date: 7/13/2023  Continued dates: 10/19/2023, 1/18/2024, 4/24/2024, 8/13/2024    Intervention(s)  Client Centered  CBT  Internal Family Systems  Psycho-education      Goal 2: Decrease severity of anxiety related symptoms.       Objective #A (Patient Action)    Status: New - Date: 7/13/2023  Continued dates: 10/19/2023, 1/18/2024, 4/24/2024, 8/13/2024    Patient will identify triggers/ fears / thoughts that contribute to feeling anxious.    Intervention(s)  Psychodynamic  CBT  Internal Family Systems    Objective #B  Patient will use 1 or more coping skills for anxiety management each week.   Status: New - Date: 7/13/2023  Continued dates: 10/19/2023, 1/18/2024, 4/24/2024, 8/13/2024    Intervention(s)  Therapist will teach emotional regulation skills. (mindfulness, breathing techniques, progressive muscle relaxation) .  Co-develop short-term goals and review progress in session.   CBT  Internal Family  Systems  Mindfulness      Goal 3: Improve interpersonal relationships.       Objective #A (Patient Action)    Status: New - Date: 7/13/2023  Continued dates: 10/19/2023, 1/18/2024, 4/24/2024, 8/13/2024    Patient will compile a list of boundaries that they would like to set with others.    Intervention(s)  Client Centered  Motivational Interviewing  Psycho-education  Psychodynamic  Internal Family Systems.     Objective #B  Patient will practice setting boundaries 1 or more times each week.    Status: New - Date: 7/13/2023  Continued dates: 10/19/2023, 1/18/2024, 4/24/2024, 8/13/2024    Intervention(s)  Client Centered  Motivational Interviewing  Co-develop short-term goals and review progress in session.         Patient has reviewed and agreed to the above plan.      Aidee Conti, [facility] August 13, 2024

## 2024-09-03 ENCOUNTER — VIRTUAL VISIT (OUTPATIENT)
Dept: PSYCHIATRY | Facility: CLINIC | Age: 54
End: 2024-09-03
Payer: COMMERCIAL

## 2024-09-03 DIAGNOSIS — F41.9 ANXIETY DISORDER, UNSPECIFIED TYPE: ICD-10-CM

## 2024-09-03 DIAGNOSIS — F90.0 ADHD (ATTENTION DEFICIT HYPERACTIVITY DISORDER), INATTENTIVE TYPE: Primary | ICD-10-CM

## 2024-09-03 PROCEDURE — 99213 OFFICE O/P EST LOW 20 MIN: CPT | Mod: 95 | Performed by: NURSE PRACTITIONER

## 2024-09-03 PROCEDURE — G2211 COMPLEX E/M VISIT ADD ON: HCPCS | Mod: 95 | Performed by: NURSE PRACTITIONER

## 2024-09-03 RX ORDER — DEXTROAMPHETAMINE SACCHARATE, AMPHETAMINE ASPARTATE MONOHYDRATE, DEXTROAMPHETAMINE SULFATE AND AMPHETAMINE SULFATE 2.5; 2.5; 2.5; 2.5 MG/1; MG/1; MG/1; MG/1
10 CAPSULE, EXTENDED RELEASE ORAL DAILY
Qty: 30 CAPSULE | Refills: 0 | Status: SHIPPED | OUTPATIENT
Start: 2024-10-04 | End: 2024-11-03

## 2024-09-03 RX ORDER — DEXTROAMPHETAMINE SACCHARATE, AMPHETAMINE ASPARTATE MONOHYDRATE, DEXTROAMPHETAMINE SULFATE AND AMPHETAMINE SULFATE 2.5; 2.5; 2.5; 2.5 MG/1; MG/1; MG/1; MG/1
10 CAPSULE, EXTENDED RELEASE ORAL DAILY
Qty: 30 CAPSULE | Refills: 0 | Status: SHIPPED | OUTPATIENT
Start: 2024-11-04 | End: 2024-12-04

## 2024-09-03 RX ORDER — DEXTROAMPHETAMINE SACCHARATE, AMPHETAMINE ASPARTATE MONOHYDRATE, DEXTROAMPHETAMINE SULFATE AND AMPHETAMINE SULFATE 2.5; 2.5; 2.5; 2.5 MG/1; MG/1; MG/1; MG/1
10 CAPSULE, EXTENDED RELEASE ORAL DAILY
Qty: 30 CAPSULE | Refills: 0 | Status: SHIPPED | OUTPATIENT
Start: 2024-09-03 | End: 2024-10-03

## 2024-09-03 ASSESSMENT — PAIN SCALES - GENERAL: PAINLEVEL: NO PAIN (0)
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
## 2024-09-03 NOTE — NURSING NOTE
Current patient location:  parking lot at work    Is the patient currently in the state of [place]? YES    Visit mode:VIDEO    If the visit is dropped, the patient can be reconnected by: VIDEO VISIT: Text to cell phone:   Telephone Information:   Mobile [phone]       Will anyone else be joining the visit? NO  (If patient encounters technical issues they should call [phone])    How would you like to obtain your AVS? MyChart    Are changes needed to the allergy or medication list? Pt stated no changes to allergies and Pt stated no med changes    Are refills needed on medications prescribed by this physician? YES    Rooming Documentation:  Questionnaire(s) completed      Reason for visit: JASMINE PORTILLO

## 2024-09-20 ENCOUNTER — VIRTUAL VISIT (OUTPATIENT)
Dept: PSYCHOLOGY | Facility: CLINIC | Age: 54
End: 2024-09-20
Payer: COMMERCIAL

## 2024-09-20 DIAGNOSIS — F90.0 ADHD (ATTENTION DEFICIT HYPERACTIVITY DISORDER), INATTENTIVE TYPE: Primary | ICD-10-CM

## 2024-09-20 DIAGNOSIS — F43.22 ADJUSTMENT DISORDER WITH ANXIOUS MOOD: ICD-10-CM

## 2024-09-20 PROCEDURE — 90832 PSYTX W PT 30 MINUTES: CPT | Mod: 95 | Performed by: SOCIAL WORKER

## 2024-09-20 NOTE — PROGRESS NOTES
[facility]                                     Progress Note    Patient Name: Dalia Hart  Date: 2024         Service Type: Individual      Session Start Time: 10:35 AM Session End Time:  11:12 AM   Session Length: 37 min    Session #: 34    Attendees: Client attended alone    Service Modality: [facility]      Provider verified identity through the following two step process.  Patient provided:  Patient  and Patient is known previously to provider     Telemedicine Visit: The patient's condition can be safely assessed and treated via synchronous audio and visual telemedicine encounter.      Reason for Telemedicine Visit: Patient convenience (e.g. access to timely appointments / distance to available provider)    Originating Site (Patient Location): Patient's other car    Distant Site (Provider Location): Provider Remote Setting- Home Office    Consent:  The patient/guardian has verbally consented to: the potential risks and benefits of telemedicine (video visit) versus in person care; bill my insurance or make self-payment for services provided; and responsibility for payment of non-covered services.   Distant Location (Provider):  On-site    As the provider I attest to compliance with applicable laws and regulations related to telemedicine.    DATA  Interactive Complexity: No  Crisis: No        Progress Since Last Session (Related to Symptoms / Goals / Homework):   Symptoms:  No change in symptoms reported  Patient continues to present with ADHD and adjustment disorder related symptoms.    Homework: Achieved / completed to satisfaction      Episode of Care Goals: Satisfactory progress - ACTION (Actively working towards change); Intervened by reinforcing change plan / affirming steps taken     Current / Ongoing Stressors and Concerns:   The patient continues to report and present with: ADHD, depression, adjustment disorder with anxious mood symptoms. The  patient identified the following stressors and concerns: she got a shingles vaccine yesterday and is feeling really sick, interpersonal stress, work related stress and parenting stress. The patient processed through her thoughts and emotions related to current stressors.        Treatment Objective(s) Addressed in This Session:   Identify triggers/ fears / thoughts that contribute to feeling anxious  Decrease frequency and intensity of feeling down, depressed, hopeless     Intervention:   Client Centered   Psychodynamic - processed through internal experiences   Validation and Normalization   Co-develop short-term goals and review progress in session.   Motivational Interviewing    Assessments completed prior to visit: None  The following assessments were completed by patient for this visit: None     ASSESSMENT: Current Emotional / Mental Status (status of significant symptoms):   Risk status (Self / Other harm or suicidal ideation)   Patient denies current fears or concerns for personal safety.   Patient denies current or recent suicidal ideation or behaviors.   Patient denies current or recent homicidal ideation or behaviors.   Patient denies current or recent self injurious behavior or ideation.   Patient denies other safety concerns.   Patient reports there has been no change in risk factors since their last session.     Patient reports there has been no change in protective factors since their last session.     Recommended that patient call 911 or go to the local ED should there be a change in any of these risk factors.     Appearance:   Appropriate    Eye Contact:   Good    Psychomotor Behavior: Normal    Attitude:   Cooperative  Interested Friendly Pleasant Attentive   Orientation:   All   Speech    Rate / Production: Normal/ Responsive    Volume:  Normal    Mood:    NormalFeeling Unwell   Affect:    Appropriate    Thought Content:  Clear    Thought Form:  Coherent  Logical    Insight:    Good      Medication  Review:   No changes to current psychiatric medication(s)     Medication Compliance:   Yes     Changes in Health Issues:   None reported     Chemical Use Review:   Substance Use: Chemical use reviewed, no active concerns identified      Tobacco Use: Not addressed in visit.     Diagnosis:  1. ADHD (attention deficit hyperactivity disorder), inattentive type    2. Adjustment disorder with anxious mood          Collateral Reports Completed:   Not Applicable    PLAN: (Patient Tasks / Therapist Tasks / Other  Patient plans to rest and recover.  Patient plans to return for follow up: 9/30/2024      Aidee Conti, [facility]                                                      ______________________________________________________________________    Individual Treatment Plan    Patient's Name: Dalia Hart  YOB: 1970    Date of Creation: 7/13/2023    Date Treatment Plan Last Reviewed/Revised: August 13, 2024      DSM5 Diagnoses:   1. Major depressive disorder, recurrent episode, moderate (H)    2. Adjustment disorder with anxious mood      Psychosocial / Contextual Factors: Patient identified the following stressors/concerns: financial, patient recently lost her job, and parenting stress. Patent report that she is not getting a lot of sleep and reports that she does not always get regular sleep.   PROMIS (reviewed every 90 days): 25    Referral / Collaboration:  Referral to another professional/service is not indicated at this time..    Anticipated number of session for this episode of care: 9-12 sessions  Anticipation frequency of session: Weekly  Anticipated Duration of each session: 38-52 minutes  Treatment plan will be reviewed in 90 days or when goals have been changed.       MeasurableTreatment Goal(s) related to diagnosis / functional impairment(s)  Goal 1: Decrease severity of depression symptoms.       Objective #A (Patient Action)    Patient will Decrease frequency and intensity of feeling  down, depressed, hopeless.  Status: New - Date: 7/13/2023  Continued dates: 10/19/2023, 1/18/2024, 4/24/2024, 8/13/2024    Intervention(s)  Client Centered  CBT  Psycho-dynamic  Internal Family Systems  Psycho-education    Objective #B  Patient will Improve quantity and quality of night time sleep / decrease daytime naps.  Status: New - Date: 7/13/2023  Continued dates: 10/19/2023, 1/18/2024, 4/24/2024, 8/13/2024    Intervention(s)  Client Centered  CBT  Internal Family Systems  Psycho-education  Mindulness    Objective #C  Patient will Identify negative self-talk and behaviors: challenge core beliefs, myths, and actions.  Status: New - Date: 7/13/2023  Continued dates: 10/19/2023, 1/18/2024, 4/24/2024, 8/13/2024    Intervention(s)  Client Centered  CBT  Internal Family Systems  Psycho-education      Goal 2: Decrease severity of anxiety related symptoms.       Objective #A (Patient Action)    Status: New - Date: 7/13/2023  Continued dates: 10/19/2023, 1/18/2024, 4/24/2024, 8/13/2024    Patient will identify triggers/ fears / thoughts that contribute to feeling anxious.    Intervention(s)  Psychodynamic  CBT  Internal Family Systems    Objective #B  Patient will use 1 or more coping skills for anxiety management each week.   Status: New - Date: 7/13/2023  Continued dates: 10/19/2023, 1/18/2024, 4/24/2024, 8/13/2024    Intervention(s)  Therapist will teach emotional regulation skills. (mindfulness, breathing techniques, progressive muscle relaxation) .  Co-develop short-term goals and review progress in session.   CBT  Internal Family Systems  Mindfulness      Goal 3: Improve interpersonal relationships.       Objective #A (Patient Action)    Status: New - Date: 7/13/2023  Continued dates: 10/19/2023, 1/18/2024, 4/24/2024, 8/13/2024    Patient will compile a list of boundaries that they would like to set with others.    Intervention(s)  Client Centered  Motivational  Interviewing  Psycho-education  Psychodynamic  Internal Family Systems.     Objective #B  Patient will practice setting boundaries 1 or more times each week.    Status: New - Date: 7/13/2023  Continued dates: 10/19/2023, 1/18/2024, 4/24/2024, 8/13/2024    Intervention(s)  Client Centered  Motivational Interviewing  Co-develop short-term goals and review progress in session.         Patient has reviewed and agreed to the above plan.      Aidee Conti, [facility] August 13, 2024

## 2024-09-25 ENCOUNTER — VIRTUAL VISIT (OUTPATIENT)
Dept: INTERNAL MEDICINE | Facility: CLINIC | Age: 54
End: 2024-09-25
Payer: COMMERCIAL

## 2024-09-25 DIAGNOSIS — R09.81 CONGESTION OF PARANASAL SINUS: Primary | ICD-10-CM

## 2024-09-25 PROCEDURE — 99213 OFFICE O/P EST LOW 20 MIN: CPT | Mod: 95 | Performed by: INTERNAL MEDICINE

## 2024-09-25 RX ORDER — GUAIFENESIN 600 MG/1
600 TABLET, EXTENDED RELEASE ORAL 2 TIMES DAILY
Qty: 14 TABLET | Refills: 0 | Status: SHIPPED | OUTPATIENT
Start: 2024-09-25 | End: 2024-10-02

## 2024-09-25 NOTE — PROGRESS NOTES
"Leila is a 54 year old who is being evaluated via a billable video visit.    How would you like to obtain your AVS? MyChart  If the video visit is dropped, the invitation should be resent by: Text to cell phone: [phone]  Will anyone else be joining your video visit? No      Assessment & Plan     Congestion of paranasal sinus  Home COVID test negative.  Symptoms present for around 3 days.  Patient wondering if it is reasonable to proceed with flu test.  We discussed that testing can be ordered the patient will be out of the window for treatment with Tamiflu.  As such, patient would like to hold off on the testing.  Symptomatology most likely secondary to viral etiology.  Encouraged the patient to continue with conservative management.  Use of Mucinex twice daily for around 1 week to help with congestive symptoms.  Can also use nasal steaming, nasal sprays as well as NSAIDs/Tylenol as needed.  Return precautions discussed.  - guaiFENesin (MUCINEX) 600 MG 12 hr tablet; Take 1 tablet (600 mg) by mouth 2 times daily for 7 days.          Nicotine/Tobacco Cessation  She reports that she has been smoking cigarettes. She has a 2.8 pack-year smoking history. She has never used smokeless tobacco.        BMI  Estimated body mass index is 33.57 kg/m  as calculated from the following:    Height as of 12/31/23: 1.676 m (5' 6\").    Weight as of 12/31/23: 94.3 kg (208 lb).             Subjective   Leila is a 54 year old, presenting for the following health issues:  Cold Symptoms          HPI     Cough, sore throat and paranasal congestion.  Symptoms present around 3 days.  Feels chills but unsure of fevers(does not have thermometer).  Home covid test today which was negative.    Review of Systems  Constitutional, HEENT, cardiovascular, pulmonary, gi and gu systems are negative, except as otherwise noted.      Objective           Vitals:  No vitals were obtained today due to virtual visit.    Physical Exam   GENERAL: alert " and no distress  EYES: Eyes grossly normal to inspection.  No discharge or erythema, or obvious scleral/conjunctival abnormalities.  RESP: No audible wheeze, cough, or visible cyanosis.    SKIN: Visible skin clear. No significant rash, abnormal pigmentation or lesions.  NEURO: Cranial nerves grossly intact.  Mentation and speech appropriate for age.  PSYCH: Appropriate affect, tone, and pace of words          Video-Visit Details    Type of service:  Video Visit   Originating Location (pt. Location): Home    Distant Location (provider location):  On-site  Platform used for Video Visit: Lisa  Signed Electronically by: Shira Martinez MD

## 2024-09-30 ENCOUNTER — VIRTUAL VISIT (OUTPATIENT)
Dept: PSYCHOLOGY | Facility: CLINIC | Age: 54
End: 2024-09-30
Payer: COMMERCIAL

## 2024-09-30 DIAGNOSIS — F33.1 MAJOR DEPRESSIVE DISORDER, RECURRENT EPISODE, MODERATE (H): ICD-10-CM

## 2024-09-30 DIAGNOSIS — F90.0 ADHD (ATTENTION DEFICIT HYPERACTIVITY DISORDER), INATTENTIVE TYPE: Primary | ICD-10-CM

## 2024-09-30 DIAGNOSIS — F43.22 ADJUSTMENT DISORDER WITH ANXIOUS MOOD: ICD-10-CM

## 2024-09-30 PROCEDURE — 90834 PSYTX W PT 45 MINUTES: CPT | Mod: 95 | Performed by: SOCIAL WORKER

## 2024-09-30 NOTE — PROGRESS NOTES
[facility]                                     Progress Note    Patient Name: Dalia Hart  Date: 2024       Service Type: Individual      Session Start Time: 2:19 AM Session End Time:  3:10 PM  Session Length: 51 min    Session #: 35    Attendees: Client attended alone    [facility]ality[facility]      Provider verified identity through the following two step process.  Patient provided:  Patient  and Patient is known previously to provider     Telemedicine Visit: The patient's condition can be safely assessed and treated via synchronous audio and visual telemedicine encounter.      Reason for Telemedicine Visit: Patient convenience (e.g. access to timely appointments / distance to available provider)    Originating Site (Patient Location): Patient's other car    Distant Site (Provider Location): Provider Remote Setting- Home Office    Consent:  The patient/guardian has verbally consented to: the potential risks and benefits of telemedicine (video visit) versus in person care; bill my insurance or make self-payment for services provided; and responsibility for payment of non-covered services.   Distant Location (Provider):  On-site    As the provider I attest to compliance with applicable laws and regulations related to telemedicine.    DATA  Interactive Complexity: No  Crisis: No        Progress Since Last Session (Related to Symptoms / Goals / Homework):   Symptoms:  No change in symptoms reported  Patient continues to present with ADHD and adjustment disorder related symptoms.    Homework: Achieved / completed to satisfaction      Episode of Care Goals: Satisfactory progress - ACTION (Actively working towards change); Intervened by reinforcing change plan / affirming steps taken     Current / Ongoing Stressors and Concerns:   The patient continues to report and present with: ADHD, depression, adjustment disorder with anxious mood symptoms. 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 The patient processed through her internal experiences related to: current stressors, work, her interpersonal relationships, and her recent interpersonal interactions.        Treatment Objective(s) Addressed in This Session:   Identify triggers/ fears / thoughts that contribute to feeling anxious  Decrease frequency and intensity of feeling down, depressed, hopeless     Intervention:   Client Centered   Psychodynamic - processed through internal experiences   Validation and Normalization   Co-develop short-term goals and review progress in session.   Motivational Interviewing    Assessments completed prior to visit: None  The following assessments were completed by patient for this visit: None     ASSESSMENT: Current Emotional / Mental Status (status of significant symptoms):   Risk status (Self / Other harm or suicidal ideation)   Patient denies current fears or concerns for personal safety.   Patient denies current or recent suicidal ideation or behaviors.   Patient denies current or recent homicidal ideation or behaviors.   Patient denies current or recent self injurious behavior or ideation.   Patient denies other safety concerns.   Patient reports there has been no change in risk factors since their last session.     Patient reports there has been no change in protective factors since their last session.     Recommended that patient call 911 or go to the local ED should there be a change in any of these risk factors.     Appearance:   Appropriate    Eye Contact:   Good    Psychomotor Behavior: Normal    Attitude:   Cooperative  Interested Friendly Pleasant Attentive   Orientation:   All   Speech    Rate / Production: Normal/ Responsive    Volume:  Normal    Mood:    Normal   Affect:    Appropriate    Thought Content:  Clear    Thought  Form:  Coherent  Logical    Insight:    Good      Medication Review:   No changes to current psychiatric medication(s)     Medication Compliance:   Yes     Changes in Health Issues:   None reported     Chemical Use Review:   Substance Use: Chemical use reviewed, no active concerns identified      Tobacco Use: Not addressed in visit.     Diagnosis:  1. ADHD (attention deficit hyperactivity disorder), inattentive type    2. Adjustment disorder with anxious mood    3. Major depressive disorder, recurrent episode, moderate (H)        Collateral Reports Completed:   Not Applicable    PLAN: (Patient Tasks / Therapist Tasks / Other  Patient plans to journal.  Patient plans to to be mindful and intentional about who I spend my time with.   Patient plans to complete her taxes.   Patient plans to return for follow up: 10/09/2024      Aidee Conti, [facility]                                                      ______________________________________________________________________    Individual Treatment Plan    Patient's Name: Dalia Hart  YOB: 1970    Date of Creation: 7/13/2023    Date Treatment Plan Last Reviewed/Revised: August 13, 2024      DSM5 Diagnoses:   1. Major depressive disorder, recurrent episode, moderate (H)    2. Adjustment disorder with anxious mood      Psychosocial / Contextual Factors: Patient identified the following stressors/concerns: financial, patient recently lost her job, and parenting stress. Patent report that she is not getting a lot of sleep and reports that she does not always get regular sleep.   PROMIS (reviewed every 90 days): 25    Referral / Collaboration:  Referral to another professional/service is not indicated at this time..    Anticipated number of session for this episode of care: 9-12 sessions  Anticipation frequency of session: Weekly  Anticipated Duration of each session: 38-52 minutes  Treatment plan will be reviewed in 90 days or when goals have been  changed.       MeasurableTreatment Goal(s) related to diagnosis / functional impairment(s)  Goal 1: Decrease severity of depression symptoms.       Objective #A (Patient Action)    Patient will Decrease frequency and intensity of feeling down, depressed, hopeless.  Status: New - Date: 7/13/2023  Continued dates: 10/19/2023, 1/18/2024, 4/24/2024, 8/13/2024    Intervention(s)  Client Centered  CBT  Psycho-dynamic  Internal Family Systems  Psycho-education    Objective #B  Patient will Improve quantity and quality of night time sleep / decrease daytime naps.  Status: New - Date: 7/13/2023  Continued dates: 10/19/2023, 1/18/2024, 4/24/2024, 8/13/2024    Intervention(s)  Client Centered  CBT  Internal Family Systems  Psycho-education  Mindulness    Objective #C  Patient will Identify negative self-talk and behaviors: challenge core beliefs, myths, and actions.  Status: New - Date: 7/13/2023  Continued dates: 10/19/2023, 1/18/2024, 4/24/2024, 8/13/2024    Intervention(s)  Client Centered  CBT  Internal Family Systems  Psycho-education      Goal 2: Decrease severity of anxiety related symptoms.       Objective #A (Patient Action)    Status: New - Date: 7/13/2023  Continued dates: 10/19/2023, 1/18/2024, 4/24/2024, 8/13/2024    Patient will identify triggers/ fears / thoughts that contribute to feeling anxious.    Intervention(s)  Psychodynamic  CBT  Internal Family Systems    Objective #B  Patient will use 1 or more coping skills for anxiety management each week.   Status: New - Date: 7/13/2023  Continued dates: 10/19/2023, 1/18/2024, 4/24/2024, 8/13/2024    Intervention(s)  Therapist will teach emotional regulation skills. (mindfulness, breathing techniques, progressive muscle relaxation) .  Co-develop short-term goals and review progress in session.   CBT  Internal Family Systems  Mindfulness      Goal 3: Improve interpersonal relationships.       Objective #A (Patient Action)    Status: New - Date: 7/13/2023  Continued  dates: 10/19/2023, 1/18/2024, 4/24/2024, 8/13/2024    Patient will compile a list of boundaries that they would like to set with others.    Intervention(s)  Client Centered  Motivational Interviewing  Psycho-education  Psychodynamic  Internal Family Systems.     Objective #B  Patient will practice setting boundaries 1 or more times each week.    Status: New - Date: 7/13/2023  Continued dates: 10/19/2023, 1/18/2024, 4/24/2024, 8/13/2024    Intervention(s)  Client Centered  Motivational Interviewing  Co-develop short-term goals and review progress in session.         Patient has reviewed and agreed to the above plan.      Aidee Conti, [facility] August 13, 2024

## 2024-10-09 ENCOUNTER — VIRTUAL VISIT (OUTPATIENT)
Dept: PSYCHOLOGY | Facility: CLINIC | Age: 54
End: 2024-10-09
Payer: COMMERCIAL

## 2024-10-09 ENCOUNTER — MYC REFILL (OUTPATIENT)
Dept: PSYCHIATRY | Facility: CLINIC | Age: 54
End: 2024-10-09
Payer: COMMERCIAL

## 2024-10-09 DIAGNOSIS — F43.22 ADJUSTMENT DISORDER WITH ANXIOUS MOOD: ICD-10-CM

## 2024-10-09 DIAGNOSIS — F90.0 ADHD (ATTENTION DEFICIT HYPERACTIVITY DISORDER), INATTENTIVE TYPE: Primary | ICD-10-CM

## 2024-10-09 DIAGNOSIS — F90.0 ADHD (ATTENTION DEFICIT HYPERACTIVITY DISORDER), INATTENTIVE TYPE: ICD-10-CM

## 2024-10-09 DIAGNOSIS — F33.1 MAJOR DEPRESSIVE DISORDER, RECURRENT EPISODE, MODERATE (H): ICD-10-CM

## 2024-10-09 PROCEDURE — 90834 PSYTX W PT 45 MINUTES: CPT | Mod: 95 | Performed by: SOCIAL WORKER

## 2024-10-09 RX ORDER — DEXTROAMPHETAMINE SACCHARATE, AMPHETAMINE ASPARTATE MONOHYDRATE, DEXTROAMPHETAMINE SULFATE AND AMPHETAMINE SULFATE 2.5; 2.5; 2.5; 2.5 MG/1; MG/1; MG/1; MG/1
10 CAPSULE, EXTENDED RELEASE ORAL DAILY
Qty: 30 CAPSULE | Refills: 0 | OUTPATIENT
Start: 2024-10-09

## 2024-10-09 NOTE — TELEPHONE ENCOUNTER
Date of Last Office Visit: 9/3/2024  Date of Next Office Visit:  12/3/2024  No shows since last visit: No  More than one patient-initiated cancellation (with reschedule) since last seen in clinic? No      Medication(s) requested:   -  amphetamine-dextroamphetamine (ADDERALL XR) 10 MG 24 hr capsule   Date last ordered: 9/3/2024  Qty: 30  Refills: 2 with start dates of 10/3/2024 and 11/4/2024    Sent to [id number] - [place], [place] - [address] [place] [facility] [place] [address]  Appropriate for refill? No: pharmacy should have refill(s) on file     Additional action taken? contacted patient via [employer] o contact the pharmacy directly as there should already be refills on file .      Mary Sifuentes RN on 10/9/2024 at 3:40 PM

## 2024-10-09 NOTE — PROGRESS NOTES
[facility]                                     Progress Note    Patient Name: Dalia Hart  Date: 2024         Service Type: Individual      Session Start Time: 1:03 PM Session End Time:  1:51 PM  Session Length:  48 min    Session #: 36    Attendees: Client attended alone    Service Modality: Video Visit via [employer]      Provider verified identity through the following two step process.  Patient provided:  Patient  and Patient is known previously to provider     Telemedicine Visit: The patient's condition can be safely assessed and treated via synchronous audio and visual telemedicine encounter.      Reason for Telemedicine Visit: Patient convenience (e.g. access to timely appointments / distance to available provider)    Originating Site (Patient Location): Patient's other car    Distant Site (Provider Location): Provider Remote Setting- Home Office    Consent:  The patient/guardian has verbally consented to: the potential risks and benefits of telemedicine (video visit) versus in person care; bill my insurance or make self-payment for services provided; and responsibility for payment of non-covered services.   Distant Location (Provider):  On-site    As the provider I attest to compliance with applicable laws and regulations related to telemedicine.    DATA  Interactive Complexity: No  Crisis: No        Progress Since Last Session (Related to Symptoms / Goals / Homework):   Symptoms:  No change in symptoms reported  Patient continues to present with ADHD and adjustment disorder related symptoms.    Homework: Achieved / completed to satisfaction      Episode of Care Goals: Satisfactory progress - ACTION (Actively working towards change); Intervened by reinforcing change plan / affirming steps taken     Current / Ongoing Stressors and Concerns:   The patient continues to report and present with: ADHD, depression, adjustment disorder with anxious mood symptoms. The patient  identified the following stressors and concerns: patient does not have any PTO left at work, the patient reports that she has a toothache, and work related stress.    The patient processed through her internal experiences related to: her week, her job, her interpersonal relationships, her recent interpersonal interactions, her recent parenting experiences, and her upcoming trip.        Treatment Objective(s) Addressed in This Session:   Identify triggers/ fears / thoughts that contribute to feeling anxious  Identify negative self-talk and behaviors: challenge core beliefs, myths, and actions  Improve concentration, focus, and mindfulness in daily activities      Intervention:   Client Centered   Psychodynamic - processed through internal experiences   Validation and Normalization   Co-develop short-term goals and review progress in session.       Assessments completed prior to visit: None  The following assessments were completed by patient for this visit: None     ASSESSMENT: Current Emotional / Mental Status (status of significant symptoms):   Risk status (Self / Other harm or suicidal ideation)   Patient denies current fears or concerns for personal safety.   Patient denies current or recent suicidal ideation or behaviors.   Patient denies current or recent homicidal ideation or behaviors.   Patient denies current or recent self injurious behavior or ideation.   Patient denies other safety concerns.   Patient reports there has been no change in risk factors since their last session.     Patient reports there has been no change in protective factors since their last session.     Recommended that patient call 911 or go to the local ED should there be a change in any of these risk factors.     Appearance:   Appropriate    Eye Contact:   Good    Psychomotor Behavior: Normal    Attitude:   Cooperative  Interested Friendly Pleasant Attentive   Orientation:   All   Speech    Rate / Production: Normal/  Responsive    Volume:  Normal    Mood:    Anxious  Normal Excited Happy   Affect:    Appropriate    Thought Content:  Clear    Thought Form:  Coherent  Logical    Insight:    Good      Medication Review:   No changes to current psychiatric medication(s)     Medication Compliance:   Yes     Changes in Health Issues:   None reported     Chemical Use Review:   Substance Use: Chemical use reviewed, no active concerns identified      Tobacco Use: Not addressed in visit.     Diagnosis:  1. ADHD (attention deficit hyperactivity disorder), inattentive type    2. Adjustment disorder with anxious mood    3. Major depressive disorder, recurrent episode, moderate (H)          Collateral Reports Completed:   Not Applicable    PLAN: (Patient Tasks / Therapist Tasks / Other  Patient plans to do her taxes.  Patient plans to take things one day at a time.   Patient plans to return for follow up: 10/25/2024      Aidee Conti, [facility]                                                      ______________________________________________________________________    Individual Treatment Plan    Patient's Name: Dalia Hart  YOB: 1970    Date of Creation: 7/13/2023    Date Treatment Plan Last Reviewed/Revised: August 13, 2024      DSM5 Diagnoses:   1. Major depressive disorder, recurrent episode, moderate (H)    2. Adjustment disorder with anxious mood      Psychosocial / Contextual Factors: Patient identified the following stressors/concerns: financial, patient recently lost her job, and parenting stress. Patent report that she is not getting a lot of sleep and reports that she does not always get regular sleep.   PROMIS (reviewed every 90 days): 25    Referral / Collaboration:  Referral to another professional/service is not indicated at this time..    Anticipated number of session for this episode of care: 9-12 sessions  Anticipation frequency of session: Weekly  Anticipated Duration of each session: 38-52  minutes  Treatment plan will be reviewed in 90 days or when goals have been changed.       MeasurableTreatment Goal(s) related to diagnosis / functional impairment(s)  Goal 1: Decrease severity of depression symptoms.       Objective #A (Patient Action)    Patient will Decrease frequency and intensity of feeling down, depressed, hopeless.  Status: New - Date: 7/13/2023  Continued dates: 10/19/2023, 1/18/2024, 4/24/2024, 8/13/2024    Intervention(s)  Client Centered  CBT  Psycho-dynamic  Internal Family Systems  Psycho-education    Objective #B  Patient will Improve quantity and quality of night time sleep / decrease daytime naps.  Status: New - Date: 7/13/2023  Continued dates: 10/19/2023, 1/18/2024, 4/24/2024, 8/13/2024    Intervention(s)  Client Centered  CBT  Internal Family Systems  Psycho-education  Mindulness    Objective #C  Patient will Identify negative self-talk and behaviors: challenge core beliefs, myths, and actions.  Status: New - Date: 7/13/2023  Continued dates: 10/19/2023, 1/18/2024, 4/24/2024, 8/13/2024    Intervention(s)  Client Centered  CBT  Internal Family Systems  Psycho-education    Objective #D  Improve concentration, focus, and mindfulness in daily activities   Status: New - Date: 10/09/2024    Intervention(s)  Client Centered  CBT  Internal Family Systems  Psycho-education  Solutions Focused  Motivational Interviewing    Goal 2: Decrease severity of anxiety related symptoms.       Objective #A (Patient Action)    Status: New - Date: 7/13/2023  Continued dates: 10/19/2023, 1/18/2024, 4/24/2024, 8/13/2024    Patient will identify triggers/ fears / thoughts that contribute to feeling anxious.    Intervention(s)  Psychodynamic  CBT  Internal Family Systems    Objective #B  Patient will use 1 or more coping skills for anxiety management each week.   Status: New - Date: 7/13/2023  Continued dates: 10/19/2023, 1/18/2024, 4/24/2024, 8/13/2024    Intervention(s)  Therapist will teach emotional  regulation skills. (mindfulness, breathing techniques, progressive muscle relaxation) .  Co-develop short-term goals and review progress in session.   CBT  Internal Family Systems  Mindfulness      Goal 3: Improve interpersonal relationships.       Objective #A (Patient Action)    Status: New - Date: 7/13/2023  Continued dates: 10/19/2023, 1/18/2024, 4/24/2024, 8/13/2024    Patient will compile a list of boundaries that they would like to set with others.    Intervention(s)  Client Centered  Motivational Interviewing  Psycho-education  Psychodynamic  Internal Family Systems.     Objective #B  Patient will practice setting boundaries 1 or more times each week.    Status: New - Date: 7/13/2023  Continued dates: 10/19/2023, 1/18/2024, 4/24/2024, 8/13/2024    Intervention(s)  Client Centered  Motivational Interviewing  Co-develop short-term goals and review progress in session.         Patient has reviewed and agreed to the above plan.      Aidee Conti, [facility] August 13, 2024

## 2024-12-03 ENCOUNTER — VIRTUAL VISIT (OUTPATIENT)
Dept: PSYCHIATRY | Facility: CLINIC | Age: 54
End: 2024-12-03
Payer: COMMERCIAL

## 2024-12-03 DIAGNOSIS — F41.9 ANXIETY DISORDER, UNSPECIFIED TYPE: ICD-10-CM

## 2024-12-03 DIAGNOSIS — F41.0 PANIC ATTACK: ICD-10-CM

## 2024-12-03 DIAGNOSIS — F90.0 ADHD (ATTENTION DEFICIT HYPERACTIVITY DISORDER), INATTENTIVE TYPE: Primary | ICD-10-CM

## 2024-12-03 RX ORDER — DEXTROAMPHETAMINE SACCHARATE, AMPHETAMINE ASPARTATE MONOHYDRATE, DEXTROAMPHETAMINE SULFATE AND AMPHETAMINE SULFATE 2.5; 2.5; 2.5; 2.5 MG/1; MG/1; MG/1; MG/1
10 CAPSULE, EXTENDED RELEASE ORAL DAILY
Qty: 30 CAPSULE | Refills: 0 | Status: SHIPPED | OUTPATIENT
Start: 2025-01-02 | End: 2025-02-01

## 2024-12-03 RX ORDER — DEXTROAMPHETAMINE SACCHARATE, AMPHETAMINE ASPARTATE MONOHYDRATE, DEXTROAMPHETAMINE SULFATE AND AMPHETAMINE SULFATE 2.5; 2.5; 2.5; 2.5 MG/1; MG/1; MG/1; MG/1
10 CAPSULE, EXTENDED RELEASE ORAL DAILY
Qty: 30 CAPSULE | Refills: 0 | Status: SHIPPED | OUTPATIENT
Start: 2024-12-03 | End: 2025-01-02

## 2024-12-03 RX ORDER — DEXTROAMPHETAMINE SACCHARATE, AMPHETAMINE ASPARTATE, DEXTROAMPHETAMINE SULFATE AND AMPHETAMINE SULFATE 1.25; 1.25; 1.25; 1.25 MG/1; MG/1; MG/1; MG/1
5 TABLET ORAL DAILY
Qty: 30 TABLET | Refills: 0 | Status: SHIPPED | OUTPATIENT
Start: 2025-02-01 | End: 2025-03-03

## 2024-12-03 RX ORDER — DEXTROAMPHETAMINE SACCHARATE, AMPHETAMINE ASPARTATE MONOHYDRATE, DEXTROAMPHETAMINE SULFATE AND AMPHETAMINE SULFATE 2.5; 2.5; 2.5; 2.5 MG/1; MG/1; MG/1; MG/1
10 CAPSULE, EXTENDED RELEASE ORAL DAILY
Qty: 30 CAPSULE | Refills: 0 | Status: SHIPPED | OUTPATIENT
Start: 2025-02-01 | End: 2025-03-03

## 2024-12-03 RX ORDER — DEXTROAMPHETAMINE SACCHARATE, AMPHETAMINE ASPARTATE, DEXTROAMPHETAMINE SULFATE AND AMPHETAMINE SULFATE 1.25; 1.25; 1.25; 1.25 MG/1; MG/1; MG/1; MG/1
5 TABLET ORAL DAILY
Qty: 30 TABLET | Refills: 0 | Status: SHIPPED | OUTPATIENT
Start: 2025-01-02 | End: 2025-02-01

## 2024-12-03 RX ORDER — DEXTROAMPHETAMINE SACCHARATE, AMPHETAMINE ASPARTATE, DEXTROAMPHETAMINE SULFATE AND AMPHETAMINE SULFATE 1.25; 1.25; 1.25; 1.25 MG/1; MG/1; MG/1; MG/1
5 TABLET ORAL DAILY
Qty: 30 TABLET | Refills: 0 | Status: SHIPPED | OUTPATIENT
Start: 2024-12-03 | End: 2025-01-02

## 2024-12-03 ASSESSMENT — PAIN SCALES - GENERAL: PAINLEVEL_OUTOF10: NO PAIN (0)
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
## 2024-12-03 NOTE — NURSING NOTE
Current patient location:  work    Is the patient currently in the state of [place]? YES    Visit mode:VIDEO    If the visit is dropped, the patient can be reconnected by:VIDEO VISIT: Text to cell phone:   Telephone Information:   Mobile [phone]       Will anyone else be joining the visit? NO  (If patient encounters technical issues they should call [phone] :[phone])    Are changes needed to the allergy or medication list? Pt stated no changes to allergies and Pt stated no med changes    Are refills needed on medications prescribed by this physician? Discuss with provider    Rooming Documentation:  Questionnaire(s) completed    Reason for visit: RECHMONY PORTILLO

## 2025-04-13 ENCOUNTER — MYC MEDICAL ADVICE (OUTPATIENT)
Dept: URGENT CARE | Facility: URGENT CARE | Age: 55
End: 2025-04-13
Payer: COMMERCIAL

## 2025-04-13 ENCOUNTER — TELEPHONE (OUTPATIENT)
Dept: URGENT CARE | Facility: URGENT CARE | Age: 55
End: 2025-04-13
Payer: COMMERCIAL

## 2025-04-13 DIAGNOSIS — N39.0 URINARY TRACT INFECTION WITHOUT HEMATURIA, SITE UNSPECIFIED: Primary | ICD-10-CM

## 2025-04-13 RX ORDER — CEFDINIR 300 MG/1
300 CAPSULE ORAL 2 TIMES DAILY
Qty: 10 CAPSULE | Refills: 0 | Status: SHIPPED | OUTPATIENT
Start: 2025-04-13 | End: 2025-04-18

## 2025-04-13 NOTE — TELEPHONE ENCOUNTER
Called and left a VM for pt to call back.     URBANO Porter   [facility] April 13, 2025 12:13 PM

## 2025-04-13 NOTE — TELEPHONE ENCOUNTER
Called and left a VM for pt to lizette back. Pt does have mychart. Will send a mychart to pt.     URBANO Porter   [facility] April 13, 2025 2:02 PM     [facility]ATORY ENCOUNTER  FAMILY MEDICINE   OFFICE VISIT    Patient: Ulysses Guo    : 1957 MRN: [id number]     ASSESSMENT AND PLAN:     1. Essential hypertension  Some side effects from prior bp meds are limiting her choices.  We may need to consider return to beta blocker in the future.  She would like to restart spironolactone, we will consider after review of her meds  - COMPREHENSIVE METABOLIC PANEL; Future    2. CKD (chronic kidney disease) stage 2, GFR 60-89 ml/min  Check for underlying renal issues leading to swelling  - COMPREHENSIVE METABOLIC PANEL; Future    3. Hypokalemia  On torsemide and Kcl  - COMPREHENSIVE METABOLIC PANEL; Future    4. Elevated hemoglobin A1c  Due for recheck  - GLYCOHEMOGLOBIN; Future    5. Generalized edema    - COMPREHENSIVE METABOLIC PANEL; Future  - NT PROBNP; Future    6. Chronic diastolic heart failure (CMS/HCC)  Check for HF as source of her symptoms  - NT PROBNP; Future    We also touched on CP, sounds like a muscular issue, but she knows warning signs/reasons to seek help, and will try NTG if recurrent symptoms        SUBJECTIVE:       HISTORY OF PRESENT ILLNESS:  Ulysses Guo is a 65 year old female who presents today to  f/u HTN and potassium levels      She developed chest pain on left side of chest and total body swelling since starting Losartan 100 mg qd on 22.  She doesn't feel the CP is related to exertion, and it can occur with lying down. The pain lasts 30 to 60 seconds. She tried to take NTG but the medication had .  She reports total body throbbing is occurring due to the swelling.      She has h/o HTN treated in the past with spironolactone, metoprolol and doxazosin but these were stopped in May when hospitalized for dizziness, bradycardia, low bp's.   Torsemide 20 mg bid was continued, and she is taking Kcl 10 qd  Losartan 25 mg qd was added last month, dose increased to 100 mg qd due to high bp 170/100 at clinic visit with her  pulmonologist.  She is working on diet and exercise to lower her BP.    Home bp running 140-180  BP Readings from Last 4 Encounters:   12/05/22 (!) 144/80   11/11/22 (!) 170/100   10/13/22 132/84   09/23/22 (!) 140/96     Doxazosin and amlodipine led to swelling, hydralazine to headaches, and lisinopril to nausea.    She saw Cardiology on July, her h/o dizziness was thought to be related to vertigo, echo normal and heart monitor sinus rhythm, no further treatment recommended aside from work on increasing hydration.     She has had HURTADO, saw pulmonology Dr. Patton on 12/20, noted to have bilat pitting edema and concern about cardiac source.  She has been taking Advair and albuterol.       PFTs have+ obstructive pattern  Dr. Patton visit last month - scheduled polysomnogram, recommended cont Breo ellipta, BP noted high and f/u with PCP recommended     She saw Dr. Lange 11/26/21 for h/o MI 2010, HTN, HLD, CVA 2017 with residual right sided weakness, h/o brain aneurysm s/p surgery 4/2019  -noted at that visit to have whole body swelling and SOB, her exertional dyspnea was felt to be multifactorial with component of diastolic heart failure. NT-proBNP was 305 on 11/25/21 and she was instructed to increase Torsemide to 20 mg BID, this was not done until late December.   Fu with Dr. Lange 1/28/22 - improved BP and swelling, no changes in meds, f/u 6 mos advised.  Currently taking torsemide 20 mg bid, feels it is working well for her, needs refill. Lytes and creatinine normal on labs 1/24/22.     She had echo 1/13/21:  Mildly increased left ventricular wall thickness. LVEF = 60 %. No regional wall motion abnormalities. LV GLS 20  %.  Grade I/IV diastolic dysfunction (abnormal relaxation filling pattern), normal to mildly elevated filling pressures.  Normal right ventricular size and systolic function.  No significant valve abnormalities.  No pericardial effusion.  Mildly dilated ascending aorta.  No significant change since  the prior study , dated 6/12/20     Elevated Hemoglobin A1c - she has been cutting back on starches, avoiding sugary drinks; occl dessert    She is being treated for pain with buprenorphine, gabapentin 300 mg bid (although prescribed as TID)    PROBLEM LIST:  Patient Active Problem List   Diagnosis   • Hyperlipidemia   • GERD (gastroesophageal reflux disease)   • Former smoker   • Essential hypertension   • History of CVA with residual deficit   • Brain aneurysm   • History of marijuana use   • History of MI (myocardial infarction)   • Intervertebral disc disorder with myelopathy, lumbar region   • Facet syndrome, lumbar   • Discogenic syndrome, lumbar   • Near syncope   • COPD (chronic obstructive pulmonary disease) (CMS/[facility])   • CKD (chronic kidney disease) stage 2, GFR 60-89 ml/min   • Bradycardia   • Chronic heart failure with preserved ejection fraction (CMS/[facility])   • Debility   • HA (headache)       MEDICATIONS:  Current Outpatient Medications   Medication Sig   • nitroGLYCERIN (NITROSTAT) 0.4 MG sublingual tablet 1 tablet SL at onset of chest pain; repeat every 5 minutes if needed for a total of 3 tablets in 15 minutes; if no relief, seek medical attention promptly   • atorvastatin (LIPITOR) 20 MG tablet Take 1 tablet by mouth daily.   • torsemide (DEMADEX) 10 MG tablet TAKE 2 TABLETS BY MOUTH TWICE DAILY   • losartan (COZAAR) 25 MG tablet TAKE 1 TABLET BY MOUTH DAILY   • Breo Ellipta 200-25 MCG/ACT inhaler INHALE 1 PUFF BY MOUTH INTO THE LUNGS DAILY   • losartan (COZAAR) 100 MG tablet Take 1 tablet by mouth daily.   • clotrimazole (LOTRIMIN) 1 % topical solution Apply to affected nails 1-2 drops twice daily   • diclofenac (VOLTAREN) 1 % gel Apply 2 g topically in the morning and 2 g at noon and 2 g in the evening.   • baclofen (LIORESAL) 10 MG tablet TAKE 1 TABLET BY MOUTH THREE TIMES DAILY AS NEEDED FOR MUSCLE SPASM   • pantoprazole (PROTONIX) 20 MG tablet TAKE 1 TABLET BY MOUTH DAILY   • naLOXone (NARCAN) 4  MG/0.1ML nasal spray CALL 911. SPR CONTENTS OF ONE SPRAYER (0.1ML) INTO ONE NOSTRIL. REPEAT IN 2-3 MIN IF SYMPTOMS OF OPIOID EMERGENCY PERSIST, ALTERNATE NOSTRILS   • buprenorphine (BUTRANS) 20 MCG/HR patch APPLY PATCH ONCE A WEEK   • butalbital-APAP-caffeine (FIORICET) -40 MG per tablet Take 1 tablet by mouth every 4 hours as needed for Headaches.   • potassium chloride (KLOR-CON) 10 MEQ ER tablet Take 1 tablet by mouth daily.   • gabapentin (NEURONTIN) 300 MG capsule Take one pill daily in the evening, may take 2nd pill in the morning if needed for headache   • Xtampza ER 9 MG Capsule Extended Release 12 hour Abuse-Deterrent Take 9 mg by mouth 2 times daily (with meals).   • HYDROcodone-acetaminophen (NORCO) 5-325 MG per tablet Take 1 tablet by mouth 2 times daily.    • aspirin 81 MG chewable tablet Chew 1 tablet by mouth daily.   • topiramate (TOPAMAX) 50 MG tablet Take 50 mg by mouth daily.    • albuterol 108 (90 Base) MCG/ACT inhaler Inhale 2 puffs into the lungs every 4 hours as needed for Shortness of Breath.   • COMPRESSION STOCKING 20-30 Knee high compression stockings pressure 20-30 mm Hg, wear daily. Please measure for accurate fit.. please dispense 2 pairs   • Blood Pressure Monitoring (BLOOD PRESSURE CUFF) Misc Check blood pressure twice daily   • polyethylene glycol-propylene glycol 0.4-0.3 % Solution Place 1 drop into both eyes 3 times daily as needed (Dry Eyes).    • Elastic Bandages & Supports (MEDICAL COMPRESSION STOCKINGS) Misc 2 each daily. Compression 15-20 mm HG, Knee High compression stockings, 2 pair   • Ascorbic Acid (VITAMIN C) 1000 MG tablet Take 1,000 mg by mouth daily.     No current facility-administered medications for this visit.       ALLERGIES:  ALLERGIES:   Allergen Reactions   • Amlodipine Other (See Comments)     edema   • Doxazosin SWELLING   • Hydralazine HEADACHES   • Lisinopril NAUSEA       FAMILY HISTORY:  Family History   Problem Relation Age of Onset   • Other Other          no significant family history   • Cancer Neg Hx          REVIEW OF SYSTEMS:  all other pertinent systems are reviewed and are negative except as stated and as documented in the history of present illness.    Depression screening:  Recent PHQ 2/9 Score    PHQ 2:  Date Adult PHQ 2 Score Adult PHQ 2 Interpretation   5/27/2022 0 No further screening needed       PHQ 9:       DEPRESSION ASSESSMENT/PLAN:  Depression screening is negative no further plan needed.    OBJECTIVE:   PHYSICAL EXAM:  Visit Vitals  BP (!) 144/80 (BP Location: LUE - Left upper extremity, Patient Position: Sitting, Cuff Size: Large Adult)   Pulse 80   Resp 16   Wt 113.2 kg (249 lb 9 oz)   BMI 38.51 kg/m²       General:   Alert, cooperative, conversive in no acute distress.  Head:  Normocephalic, atraumatic.   Neck:  No masses or lymphadenopathy.  Eyes, ENT:  Normal conjunctivae and sclerae.   Canals clear, TMs pearly. OP clear.  Cardiovascular:  Regular rate and rhythm without murmur.  Respiratory:   Normal respiratory effort. Clear to auscultation, No wheezes, rales or rhonchi.  Back: No deformities.  Extremities: minimal LE edema. Normal pedal pulses.  Her hands seems slightly puffy, but I do not identify total body swelling she reports. Her ring finger has ring that is comfortably fitting.  Neurologic:  Upper extremity and lower extremity strength grossly intact and symmetric.

## 2025-04-16 NOTE — TELEPHONE ENCOUNTER
Patient Returning Call    Reason for call:  Pt returned call, no answer at . If there is anything new, other than the change in antibiotics, a message can be sent to pt    Information relayed to patient:  Will leave TE    Patient has additional questions:  No      Could we send this information to you in [facility] [employer] or would you prefer to receive a phone call?:   Patient would like to be contacted via [employer]

## 2025-04-16 NOTE — TELEPHONE ENCOUNTER
No further message beyond the medication change that the patient already received. aDrlene Lujan MA on 4/16/2025 at 10:59 AM

## 2025-04-21 ENCOUNTER — TELEPHONE (OUTPATIENT)
Dept: INTERNAL MEDICINE | Facility: CLINIC | Age: 55
End: 2025-04-21
Payer: COMMERCIAL

## 2025-04-21 ASSESSMENT — PATIENT HEALTH QUESTIONNAIRE - PHQ9
SUM OF ALL RESPONSES TO PHQ QUESTIONS 1-9: 3
SUM OF ALL RESPONSES TO PHQ QUESTIONS 1-9: 3
10. IF YOU CHECKED OFF ANY PROBLEMS, HOW DIFFICULT HAVE THESE PROBLEMS MADE IT FOR YOU TO DO YOUR WORK, TAKE CARE OF THINGS AT HOME, OR GET ALONG WITH OTHER PEOPLE: SOMEWHAT DIFFICULT

## 2025-04-21 NOTE — TELEPHONE ENCOUNTER
Patient calling to state she seen in urgent care 4-11 and diagnosed with UTI and bacterial vaginosis. Improved but still having urinary frequency and mild dysuria and is asking for recheck appointment. Appointment scheduled tomorrow per patient's request. Patient/Caller with no further questions or concerns.     Jennifer Gallagher RN

## 2025-04-22 ENCOUNTER — OFFICE VISIT (OUTPATIENT)
Dept: INTERNAL MEDICINE | Facility: CLINIC | Age: 55
End: 2025-04-22
Payer: COMMERCIAL

## 2025-04-22 VITALS
OXYGEN SATURATION: 97 % | SYSTOLIC BLOOD PRESSURE: 100 MMHG | WEIGHT: 212.8 LBS | DIASTOLIC BLOOD PRESSURE: 70 MMHG | HEART RATE: 64 BPM | TEMPERATURE: 97.5 F | HEIGHT: 66 IN | RESPIRATION RATE: 16 BRPM | BODY MASS INDEX: 34.2 KG/M2

## 2025-04-22 DIAGNOSIS — R30.0 DYSURIA: ICD-10-CM

## 2025-04-22 DIAGNOSIS — N89.8 VAGINAL DISCHARGE: ICD-10-CM

## 2025-04-22 DIAGNOSIS — N30.01 ACUTE CYSTITIS WITH HEMATURIA: Primary | ICD-10-CM

## 2025-04-22 LAB
ALBUMIN UR-MCNC: 100 MG/DL
APPEARANCE UR: ABNORMAL
BACTERIA #/AREA URNS HPF: ABNORMAL /HPF
BILIRUB UR QL STRIP: NEGATIVE
C TRACH DNA SPEC QL PROBE+SIG AMP: NEGATIVE
CLUE CELLS: ABNORMAL
COLOR UR AUTO: YELLOW
GLUCOSE UR STRIP-MCNC: NEGATIVE MG/DL
HGB UR QL STRIP: ABNORMAL
KETONES UR STRIP-MCNC: NEGATIVE MG/DL
LEUKOCYTE ESTERASE UR QL STRIP: ABNORMAL
N GONORRHOEA DNA SPEC QL NAA+PROBE: NEGATIVE
NITRATE UR QL: POSITIVE
PH UR STRIP: 6.5 [PH] (ref 5–7)
RBC #/AREA URNS AUTO: ABNORMAL /HPF
SP GR UR STRIP: 1.01 (ref 1–1.03)
SPECIMEN TYPE: NORMAL
SQUAMOUS #/AREA URNS AUTO: ABNORMAL /LPF
TRICHOMONAS, WET PREP: ABNORMAL
UROBILINOGEN UR STRIP-ACNC: 0.2 E.U./DL
WBC #/AREA URNS AUTO: ABNORMAL /HPF
WBC'S/HIGH POWER FIELD, WET PREP: ABNORMAL
YEAST, WET PREP: ABNORMAL

## 2025-04-22 PROCEDURE — 81001 URINALYSIS AUTO W/SCOPE: CPT | Performed by: PHYSICIAN ASSISTANT

## 2025-04-22 PROCEDURE — 87591 N.GONORRHOEAE DNA AMP PROB: CPT | Performed by: PHYSICIAN ASSISTANT

## 2025-04-22 PROCEDURE — 87086 URINE CULTURE/COLONY COUNT: CPT | Performed by: PHYSICIAN ASSISTANT

## 2025-04-22 PROCEDURE — 87186 SC STD MICRODIL/AGAR DIL: CPT | Performed by: PHYSICIAN ASSISTANT

## 2025-04-22 PROCEDURE — 3074F SYST BP LT 130 MM HG: CPT | Performed by: PHYSICIAN ASSISTANT

## 2025-04-22 PROCEDURE — 87491 CHLMYD TRACH DNA AMP PROBE: CPT | Performed by: PHYSICIAN ASSISTANT

## 2025-04-22 PROCEDURE — 87210 SMEAR WET MOUNT SALINE/INK: CPT | Performed by: PHYSICIAN ASSISTANT

## 2025-04-22 PROCEDURE — 99213 OFFICE O/P EST LOW 20 MIN: CPT | Performed by: PHYSICIAN ASSISTANT

## 2025-04-22 PROCEDURE — 3078F DIAST BP <80 MM HG: CPT | Performed by: PHYSICIAN ASSISTANT

## 2025-04-22 NOTE — PROGRESS NOTES
"  Assessment & Plan     Acute cystitis with hematuria    - cefdinir (OMNICEF) 300 MG capsule; Take 1 capsule (300 mg) by mouth 2 times daily.    Dysuria    - UA with Microscopic reflex to Culture - Clinic Collect  - Chlamydia trachomatis/Neisseria gonorrhoeae by PCR - Clinic Collect  - Urine Microscopic Exam  - Urine Culture    Vaginal discharge  Additional diflucan sent to pharmacy to use after abx - issues with yeast infections after abx use   - Chlamydia trachomatis/Neisseria gonorrhoeae by PCR - Clinic Collect  - Wet prep - Clinic Collect    Reviewed results on my chart  Culture - multiresistant - see epic        BMI  Estimated body mass index is 34.35 kg/m  as calculated from the following:    Height as of this encounter: 1.676 m (5' 6\").    Weight as of this encounter: 96.5 kg (212 lb 12.8 oz).   Weight management plan: Patient was referred to their PCP to discuss a diet and exercise plan.          Tierney Dee is a 54 year old, presenting for the following health issues:  UC Follow-Up    History of Present Illness       Reason for visit:  Urgent care fillow uo   She is taking medications regularly.      Patient with continued symptoms of dysuria after completing her antibiotics.  Medication did have to be changed due to resistance.   She is have dysuria frequency urgency yet. Also with some vaginal discomfort       ED/UC Followup:    Facility:  [facility]  Date of visit: 4/11/2025  Reason for visit: UTI  Current Status: Frequent urination and dysuria            Objective    /70 (BP Location: Left arm, Patient Position: Sitting, Cuff Size: Adult Large)   Pulse 64   Temp 97.5  F (36.4  C) (Temporal)   Resp 16   Ht 1.676 m (5' 6\")   Wt 96.5 kg (212 lb 12.8 oz)   SpO2 97%   BMI 34.35 kg/m    Body mass index is 34.35 kg/m .  Physical Exam   GENERAL: alert and no distress  RESP: lungs clear to auscultation - no rales, rhonchi or wheezes  CV: regular rates and rhythm and normal S1 S2, no S3 or " S4  MS: no gross musculoskeletal defects noted, no edema    Results for orders placed or performed in visit on 04/22/25   UA with Microscopic reflex to Culture - Clinic Collect     Status: Abnormal    Specimen: Urine, Midstream   Result Value Ref Range    Color Urine Yellow Colorless, Straw, Light Yellow, Yellow    Appearance Urine Cloudy (A) Clear    Glucose Urine Negative Negative mg/dL    Bilirubin Urine Negative Negative    Ketones Urine Negative Negative mg/dL    Specific Gravity Urine 1.015 1.003 - 1.035    Blood Urine Moderate (A) Negative    pH Urine 6.5 5.0 - 7.0    Protein Albumin Urine 100 (A) Negative mg/dL    Urobilinogen Urine 0.2 0.2, 1.0 E.U./dL    Nitrite Urine Positive (A) Negative    Leukocyte Esterase Urine Large (A) Negative   Urine Microscopic Exam     Status: Abnormal   Result Value Ref Range    Bacteria Urine Moderate (A) None Seen /HPF    RBC Urine 0-2 0-2 /HPF /HPF    WBC Urine 25-50 (A) 0-5 /HPF /HPF    Squamous Epithelials Urine Few (A) None Seen /LPF   Chlamydia trachomatis/Neisseria gonorrhoeae by PCR - Clinic Collect     Status: None    Specimen: Urine, Voided   Result Value Ref Range    Chlamydia Trachomatis Negative Negative    Neisseria gonorrhoeae Negative Negative    CTNG Specimen Source Urine, Voided    Urine Culture     Status: Abnormal    Specimen: Urine, Midstream   Result Value Ref Range    Culture >100,000 CFU/mL Escherichia coli (A)        Susceptibility    Escherichia coli - ANUSHKA     Ampicillin >=32 Resistant ug/mL     Ampicillin/ Sulbactam 16 Intermediate ug/mL     Piperacillin/Tazobactam <=4 Susceptible ug/mL     Cefazolin 4 Susceptible ug/mL     Ceftazidime <=0.5 Susceptible ug/mL     Ceftriaxone <=0.25 Susceptible ug/mL     Cefepime <=0.12 Susceptible ug/mL     Gentamicin <=1 Susceptible ug/mL     Ciprofloxacin >=4 Resistant ug/mL     Levofloxacin >=8 Resistant ug/mL     Nitrofurantoin 128 Resistant ug/mL     Trimethoprim/Sulfamethoxazole >16/304 Resistant ug/mL   Wet  prep - Clinic Collect     Status: Abnormal    Specimen: Vagina; Swab   Result Value Ref Range    Trichomonas Absent Absent    Yeast Absent Absent    Clue Cells Absent Absent    WBCs/high power field 1+ (A) None           Signed Electronically by: Amaya Rock PA-C

## 2025-04-23 LAB — BACTERIA UR CULT: ABNORMAL

## 2025-04-24 ENCOUNTER — TELEPHONE (OUTPATIENT)
Dept: INTERNAL MEDICINE | Facility: CLINIC | Age: 55
End: 2025-04-24
Payer: COMMERCIAL

## 2025-04-24 RX ORDER — CEFDINIR 300 MG/1
300 CAPSULE ORAL 2 TIMES DAILY
Qty: 14 CAPSULE | Refills: 0 | Status: SHIPPED | OUTPATIENT
Start: 2025-04-24

## 2025-04-24 RX ORDER — FLUCONAZOLE 150 MG/1
150 TABLET ORAL ONCE
Qty: 1 TABLET | Refills: 0 | Status: SHIPPED | OUTPATIENT
Start: 2025-04-24 | End: 2025-04-24

## 2025-04-24 NOTE — TELEPHONE ENCOUNTER
Test Results    Contacts       Contact Date/Time Type Contact Phone/Fax    04/24/2025 10:40 AM CDT Phone (Incoming) Leila Hart (Self) [phone] (M)            Who ordered the test:  Dr. Rock    Type of test: Lab    Date of test:  4/22    Where was the test performed:  [facility]    What are your questions/concerns?:  Patient would like a call back asa to discuss her test results. Patient states that she is in more pain than before.    Could we send this information to you in [facility] or would you prefer to receive a phone call?:   Patient would prefer a phone call   Okay to leave a detailed message?: Yes   Telephone Information:   Mobile [phone]

## 2025-04-24 NOTE — TELEPHONE ENCOUNTER
Your infection shows that the bacteria is resistant to several antibiotics  I have sent in a prescription for omnicef 300 mg twice a day for one week.  That should get rid of this for you.  Have a good rest of your week and weekend.   Written by Amaya Rock PA-C on 4/24/2025  9:59 AM CDT     Called and spoke with pt. Relayed providers note from above. Pt declined being triaged. Pt stated she will try this medication and let us know if she needs anything else.

## 2025-05-04 ENCOUNTER — MYC MEDICAL ADVICE (OUTPATIENT)
Dept: INTERNAL MEDICINE | Facility: CLINIC | Age: 55
End: 2025-05-04
Payer: COMMERCIAL

## 2025-05-04 DIAGNOSIS — R35.0 URINE FREQUENCY: Primary | ICD-10-CM

## 2025-05-05 NOTE — TELEPHONE ENCOUNTER
Please see mychart from patient and advise on appropriate course of action.     Rocio Taylor RN    [facility] Triage Nurse

## 2025-06-16 ENCOUNTER — PATIENT OUTREACH (OUTPATIENT)
Dept: CARE COORDINATION | Facility: CLINIC | Age: 55
End: 2025-06-16
Payer: COMMERCIAL

## 2025-06-18 ENCOUNTER — PATIENT OUTREACH (OUTPATIENT)
Dept: CARE COORDINATION | Facility: CLINIC | Age: 55
End: 2025-06-18
Payer: COMMERCIAL

## 2025-06-24 ENCOUNTER — RESULTS FOLLOW-UP (OUTPATIENT)
Dept: INTERNAL MEDICINE | Facility: CLINIC | Age: 55
End: 2025-06-24

## 2025-06-24 DIAGNOSIS — N39.0 URINARY TRACT INFECTION WITHOUT HEMATURIA, SITE UNSPECIFIED: Primary | ICD-10-CM

## 2025-06-24 RX ORDER — NITROFURANTOIN 25; 75 MG/1; MG/1
100 CAPSULE ORAL 2 TIMES DAILY
Qty: 10 CAPSULE | Refills: 0 | Status: SHIPPED | OUTPATIENT
Start: 2025-06-24 | End: 2025-06-29

## 2025-06-30 RX ORDER — CEPHALEXIN 500 MG/1
500 CAPSULE ORAL 2 TIMES DAILY
Qty: 14 CAPSULE | Refills: 0 | Status: SHIPPED | OUTPATIENT
Start: 2025-06-30 | End: 2025-07-01

## 2025-07-01 ENCOUNTER — VIRTUAL VISIT (OUTPATIENT)
Dept: FAMILY MEDICINE | Facility: CLINIC | Age: 55
End: 2025-07-01
Payer: COMMERCIAL

## 2025-07-01 DIAGNOSIS — F90.0 ADHD (ATTENTION DEFICIT HYPERACTIVITY DISORDER), INATTENTIVE TYPE: Primary | ICD-10-CM

## 2025-07-01 DIAGNOSIS — E66.01 MORBID OBESITY (H): ICD-10-CM

## 2025-07-01 DIAGNOSIS — F33.1 MAJOR DEPRESSIVE DISORDER, RECURRENT EPISODE, MODERATE (H): ICD-10-CM

## 2025-07-01 PROCEDURE — 99207 PR NO CHARGE LOS: CPT | Mod: 95 | Performed by: NURSE PRACTITIONER

## 2025-07-01 NOTE — PROGRESS NOTES
Leila is a 54 year old who is being evaluated via a billable video visit.    How would you like to obtain your AVS? MyChart  If the video visit is dropped, the invitation should be resent by: Text to cell phone: [phone]  Will anyone else be joining your video visit? No    Assessment & Plan     ADHD (attention deficit hyperactivity disorder), inattentive type  Major depressive disorder, recurrent episode, moderate (H)  Morbid obesity (H)      Appointment not needed medications filled by psychiatry she thought this NP was psychiatry.  She will get a hold of them for an appointment        Subjective   Leila is a 54 year old, presenting for the following health issues:  Recheck Medication      7/1/2025    11:51 AM   Additional Questions   Roomed by Yair OCHOA   Accompanied by Self     History of Present Illness       Reason for visit:  Add meds check in    She eats 2-3 servings of fruits and vegetables daily.She consumes 1 sweetened beverage(s) daily.She exercises with enough effort to increase her heart rate 10 to 19 minutes per day.  She exercises with enough effort to increase her heart rate 3 or less days per week. She is missing 3 dose(s) of medications per week.  She is not taking prescribed medications regularly due to cost of medication.      Was out of insurance - waiting for state insurance   Marlee Hendrix [facility] Psychiat[facility]    Medication Followup of Adderall XR 10mg - last fill was 02/01/2025  Taking Medication as prescribed: yes - 1 tablet daily  Side Effects:  HA when doesn't drink enough water   Medication Helping Symptoms:  yes      Medication Followup of Adderall 5mg - last fill was 02/01/2025  Taking Medication as prescribed: yes -  tablet mid day  Side Effects:  HA when doesn't drink enough water  Medication Helping Symptoms:  yes        NO UDS or CSA.   PDMP without suspicious activity.

## 2025-07-02 ENCOUNTER — VIRTUAL VISIT (OUTPATIENT)
Dept: PSYCHIATRY | Facility: CLINIC | Age: 55
End: 2025-07-02
Payer: COMMERCIAL

## 2025-07-02 DIAGNOSIS — F43.23 ADJUSTMENT DISORDER WITH MIXED ANXIETY AND DEPRESSED MOOD: ICD-10-CM

## 2025-07-02 DIAGNOSIS — F90.0 ADHD (ATTENTION DEFICIT HYPERACTIVITY DISORDER), INATTENTIVE TYPE: Primary | ICD-10-CM

## 2025-07-02 PROCEDURE — G2211 COMPLEX E/M VISIT ADD ON: HCPCS | Mod: 95 | Performed by: NURSE PRACTITIONER

## 2025-07-02 PROCEDURE — 98005 SYNCH AUDIO-VIDEO EST LOW 20: CPT | Performed by: NURSE PRACTITIONER

## 2025-07-02 RX ORDER — DEXTROAMPHETAMINE SACCHARATE, AMPHETAMINE ASPARTATE MONOHYDRATE, DEXTROAMPHETAMINE SULFATE AND AMPHETAMINE SULFATE 2.5; 2.5; 2.5; 2.5 MG/1; MG/1; MG/1; MG/1
10 CAPSULE, EXTENDED RELEASE ORAL DAILY
Qty: 30 CAPSULE | Refills: 0 | Status: SHIPPED | OUTPATIENT
Start: 2025-08-01 | End: 2025-08-31

## 2025-07-02 RX ORDER — DEXTROAMPHETAMINE SACCHARATE, AMPHETAMINE ASPARTATE MONOHYDRATE, DEXTROAMPHETAMINE SULFATE AND AMPHETAMINE SULFATE 2.5; 2.5; 2.5; 2.5 MG/1; MG/1; MG/1; MG/1
10 CAPSULE, EXTENDED RELEASE ORAL DAILY
Qty: 30 CAPSULE | Refills: 0 | Status: SHIPPED | OUTPATIENT
Start: 2025-08-31 | End: 2025-09-30

## 2025-07-02 RX ORDER — DEXTROAMPHETAMINE SACCHARATE, AMPHETAMINE ASPARTATE, DEXTROAMPHETAMINE SULFATE AND AMPHETAMINE SULFATE 1.25; 1.25; 1.25; 1.25 MG/1; MG/1; MG/1; MG/1
5 TABLET ORAL DAILY
Qty: 30 TABLET | Refills: 0 | Status: SHIPPED | OUTPATIENT
Start: 2025-08-01 | End: 2025-08-31

## 2025-07-02 RX ORDER — DEXTROAMPHETAMINE SACCHARATE, AMPHETAMINE ASPARTATE, DEXTROAMPHETAMINE SULFATE AND AMPHETAMINE SULFATE 1.25; 1.25; 1.25; 1.25 MG/1; MG/1; MG/1; MG/1
5 TABLET ORAL DAILY
Qty: 30 TABLET | Refills: 0 | Status: SHIPPED | OUTPATIENT
Start: 2025-07-02 | End: 2025-08-01

## 2025-07-02 RX ORDER — DEXTROAMPHETAMINE SACCHARATE, AMPHETAMINE ASPARTATE MONOHYDRATE, DEXTROAMPHETAMINE SULFATE AND AMPHETAMINE SULFATE 2.5; 2.5; 2.5; 2.5 MG/1; MG/1; MG/1; MG/1
10 CAPSULE, EXTENDED RELEASE ORAL DAILY
Qty: 30 CAPSULE | Refills: 0 | Status: SHIPPED | OUTPATIENT
Start: 2025-07-02 | End: 2025-08-01

## 2025-07-02 RX ORDER — DEXTROAMPHETAMINE SACCHARATE, AMPHETAMINE ASPARTATE, DEXTROAMPHETAMINE SULFATE AND AMPHETAMINE SULFATE 1.25; 1.25; 1.25; 1.25 MG/1; MG/1; MG/1; MG/1
5 TABLET ORAL DAILY
Qty: 30 TABLET | Refills: 0 | Status: SHIPPED | OUTPATIENT
Start: 2025-08-31 | End: 2025-09-30

## 2025-07-02 ASSESSMENT — PATIENT HEALTH QUESTIONNAIRE - PHQ9
10. IF YOU CHECKED OFF ANY PROBLEMS, HOW DIFFICULT HAVE THESE PROBLEMS MADE IT FOR YOU TO DO YOUR WORK, TAKE CARE OF THINGS AT HOME, OR GET ALONG WITH OTHER PEOPLE: NOT DIFFICULT AT ALL
SUM OF ALL RESPONSES TO PHQ QUESTIONS 1-9: 6
SUM OF ALL RESPONSES TO PHQ QUESTIONS 1-9: 6

## 2025-07-02 ASSESSMENT — PAIN SCALES - GENERAL: PAINLEVEL_OUTOF10: SEVERE PAIN (8)
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
## 2025-07-02 NOTE — NURSING NOTE
Current patient location: [address]  [facility] [address]    Is the patient currently in the state of [place]? YES    Visit mode: VIDEO    If the visit is dropped, the patient can be reconnected by:VIDEO VISIT: Text to cell phone:   Telephone Information:   Mobile [phone]       Will anyone else be joining the visit? NO  (If patient encounters technical issues they should call [phone])    Are changes needed to the allergy or medication list? No    Are refills needed on medications prescribed by this physician? Discuss with provider    Rooming Documentation:  Questionnaire(s) completed    Reason for visit: RECHECK    Irving PORTILLO

## 2025-07-15 PROBLEM — R87.610 ASCUS OF CERVIX WITH NEGATIVE HIGH RISK HPV: Status: ACTIVE | Noted: 2017-03-24

## 2025-07-15 PROBLEM — R87.810 CERVICAL HIGH RISK HPV (HUMAN PAPILLOMAVIRUS) TEST POSITIVE: Status: ACTIVE | Noted: 2021-06-02

## 2025-07-15 RX ORDER — CEPHALEXIN 500 MG/1
CAPSULE ORAL
COMMUNITY
Start: 2025-07-02 | End: 2025-07-22

## 2025-07-15 RX ORDER — EPINEPHRINE 0.3 MG/.3ML
INJECTION SUBCUTANEOUS
COMMUNITY
Start: 2025-07-03

## 2025-07-15 NOTE — PROGRESS NOTES
"New Medical Weight Management Consult    PATIENT:  Dalia Hart   MRN:         [id number]   :         1970  ROJELIO:         2025      Dear Physician No Ref-Primary,    I had the pleasure of seeing your patient, Dalia Hart. Full intake/assessment was done to determine barriers to weight loss success and develop a treatment plan. Dalia Hart is a 54 year old female interested in treatment of medical problems associated with excess weight. She has a height of 5' 5.75\", a weight of 204 lbs 0 oz, and the calculated Body mass index is 33.18 kg/m .    Assessment & Plan   Problem List Items Addressed This Visit       Class 1 obesity due to excess calories without serious comorbidity with body mass index (BMI) of 33.0 to 33.9 in adult    2025 initial evaluation. Pt will start Wegovy titration. Discussed mechanism of action, common side effects, titration guidelines. Medication handout given in AVS. Discussed long term use and variable rate of weight loss.    Reminded patient of pending labs ordered by PCP.  We reviewed recommendations for muscle conservation including eating three meals daily, good protein intake, and strength training.   Dietician appointment 25.         Relevant Medications    semaglutide-weight management (WEGOVY) 0.25 MG/0.5ML pen    Semaglutide-Weight Management (WEGOVY) 0.5 MG/0.5ML pen    Semaglutide-Weight Management (WEGOVY) 1 MG/0.5ML pen      Pt was 20 minutes late for visit, limited time to complete visit.    PROGRAM OVERVIEW  Discussed options available through [facility].   Education provided on chronic disease management of obesity.   We reviewed our program's volumes, risks, and outcomes data. All questions were answered.     SURGICAL WEIGHT LOSS   Not a candidate with BMI 33    MEDICATIONS:  We discussed healthy habits to assist with weight loss.We discussed the role of pharmacological agents in the treatment of obesity and the " "\"off-label\" use of medications in this practice. We reviewed medication that may assist with weight loss. Indications, contraindications, risks/benefits, and potential side effects were discussed. Explained medications must always be used together with lifestyle changes. Reviewed rationale for long term use of pharmacotherapy in chronic disease management for obesity.      AOM Considerations:  Phentermine:  Already taking Adderall  Topiramate: CNS depressant w/ Ativan   GLP-1: PA started for Wegovy.     Naltrexone: Option, gets cravings.   Wellbutrin:    Metformin:    Contrave:  Qsymia:           PATIENT INSTRUCTIONS:  Labs ordered by your primary care provider.  Please call [phone] to set up a lab appt.  Start Wegovy (semaglutide)   0.25 mg once weekly for 4-8 weeks,   if tolerating increase to 0.5 mg weekly for 4-8 weeks,   if tolerating increase to 1 mg weekly  May use famotidine 20 mg twice daily as needed for nausea/heartburn when starting the medication.      Goals:  Eat within 1 hour of waking. Try to eat every 4-6 hours. Prioritize protein 1st and fruit/veggies/fiber 2nd.  Strive to drink 64oz-80oz water throughout the day.  Work your way up to 150-300 minutes of activity per week. Strength train twice a week to preserve muscle.     Follow up:    Call [phone] to schedule next visit in October with Shelby Little NP  Dietician appt 8/8/25     42 minutes spent on the date of the encounter doing chart review, history and exam, review test results, counseling, developing plan of care, documentation, and further activities as noted above.  The longitudinal plan of care for the diagnosis(es)/condition(s) as documented were addressed during this visit. Due to the added complexity in care, I will continue to support Leila in the subsequent management and with ongoing continuity of care.      Weight Related Co-morbidities:          I have the following health issues associated with obesity: Depression "   I have the following symptoms associated with obesity: Joint pain in hips and knees     Patient Active Problem List   Diagnosis    Morbid obesity (H)    Tobacco abuse    Major depressive disorder, recurrent episode, moderate (H)    Allergic to bees    Anxiety    ASCUS of cervix with negative high risk HPV    Cervical high risk HPV (human papillomavirus) test positive     Referred by Dr. Muñoz from  on 6/13/25 due to weight plateau w/ diet and exercise    Weight History    Previously had weight loss surgery: No   Age pt became overweight:   Started gaining weight mid 30's   The following factors contributed to weight gain:    After having 3 kids   I have tried the following methods to lose weight:   Exercise, eating less. Worked with nutritionist, decreased stress.   My lowest weight since age 16 was: 110   My highest weight since age 16 was: 300   The most weight I have ever lost was: (lbs) 50-80lbs over 6-7 years   Family hx of obesity:    Are you interested in learning about weight loss surgery if appropriate?  No   How has your weight changed over the last year?  Gained   Had a goal to lose 10 lbs/year through diet/exercise. Was able to lose 50lbs over the past 6 years.  Pt has personal goal of 180lbs, BMI 29-30.    Diet Recall     Wake Up: 10A   Breakfast 11A fruit, coffee, egg or egg sandwich, or 2 cheese sticks   Lunch 4P fried chicken sandwich w/ chi at a restaurant   Supper skipped   Bedtime:   Snack between meals:   Snack in evening:    midnight      Peaches and popcorn      Typical snacks:    Caloric beverages per day. What type: Coffee in AM. 32oz Water throughout the day.    Rare energy drinks   Low lizette/diet drinks:   Alcohol:   Foods you crave:       None   Chips or cheese, sometimes candy or chocolate        Sleep History    How many hours do you sleep at night?    Do you think you have sleep apnea?  Tested negative for TIERRA in the past.   Do you snore so loudly some one can hear you through a  "closed door? No, unless exhausted   Has anyone seen or heard you stop breathing during your sleep?  no   Do you often feel tired, fatigued, or sleepy during the day?    Do you have a TV/Computer in your bedroom?          Eating Habits (Yes/No) (Never, Daily, Several Days, Weekly,Monthly)   Healthy Eater Yes, \"compared to the average person\" better than in the past      Eat fast food/take out? Once/week      Eat most meals in front of screens No      Skip meals Yes   Grazes all day Yes      Snacks between meals. Yes      Eat most food at end of day. It depends      Eat in middle of night  Yes if very hungry, will eat a cheese stick      Eat  to prevent or correct low blood sugar. No      Eat to prevent GERD No      Constant Dieter No      I emotionally eat. (stressed, depressed, anxious, bored) Yes, \"that's my bad habit\"      Feel hungry all the time-even if I just have eaten. No      Feeling full is important to me. Yes      Finish all the food on my plate-even if already full. Yes      Eat/snack without noticing I'm eating. No      Trouble not eating junk if around  Yes \"I did but I'm trying to break myself from this habit\"      Think about food all day. No      Who does the grocery shopping? Patient   Who does the cooking? Patient       Eating Disorder Screen    I binge eat No      I make myself vomit what I have eaten or use laxatives to get rid of food. No   I eat a large amount of food, like a loaf of bread, a box of cookies, a pint/quart of ice cream, all at once. No   I eat a large amount of food even when I am not hungry. No   I eat alone because I feel embarrassed and do not want others to see how much I have eaten. No   I eat until I am uncomfortably full. No   I feel bad, disgusted, or guilty after I overeat. No       Activity/Exercise History    How many hours of screen time do you have daily?    How many times a week are you active for the purpose of exercise? For how long? Tries to get 10,000 steps 3 " times/week.   What do you do for exercise?     What keeps you from being more active? Time, adult responsibilities, stress.       Work/Social History Reviewed With Patient    Employment status is: Part time   Job is:    Is job active or sedentary? Active   Marital status?    Who do you live with?  20yo daughter   Do you have children? are they overweight?      Social History     Tobacco Use    Smoking status: Some Days     Current packs/day: 0.25     Average packs/day: 0.3 packs/day for 11.0 years (2.8 ttl pk-yrs)     Types: Cigarettes, Hookah, Other    Smokeless tobacco: Current    Tobacco comments:     1 pack per month   Vaping Use    Vaping status: Every Day    Substances: Nicotine, Flavoring   Substance Use Topics    Alcohol use: No    Drug use: No    Vapes nicotine daily. Denies cigarette use.      Mental Health History Reviewed With Patient    How does your weight effect your mental health    How do you handle stress?  What coping techniques do you use?    Do you see a therapist?   Sees therapist once/week   Would you like to be connected with a therapist? No       MEDICATIONS:   Current Outpatient Medications   Medication Sig Dispense Refill    amphetamine-dextroamphetamine (ADDERALL XR) 10 MG 24 hr capsule Take 1 capsule (10 mg) by mouth daily. 30 capsule 0    [START ON 8/1/2025] amphetamine-dextroamphetamine (ADDERALL XR) 10 MG 24 hr capsule Take 1 capsule (10 mg) by mouth daily. 30 capsule 0    [START ON 8/31/2025] amphetamine-dextroamphetamine (ADDERALL XR) 10 MG 24 hr capsule Take 1 capsule (10 mg) by mouth daily. 30 capsule 0    amphetamine-dextroamphetamine (ADDERALL) 5 MG tablet Take 1 tablet (5 mg) by mouth daily. 30 tablet 0    [START ON 8/1/2025] amphetamine-dextroamphetamine (ADDERALL) 5 MG tablet Take 1 tablet (5 mg) by mouth daily. 30 tablet 0    [START ON 8/31/2025] amphetamine-dextroamphetamine (ADDERALL) 5 MG tablet Take 1 tablet (5 mg) by mouth daily. 30 tablet 0    EPINEPHrine (ANY BX  GENERIC EQUIV) 0.3 MG/0.3ML injection 2-pack INJECT 1 PEN IN THE MUSCLE AS NEEDED FOR ANAPHYLAXIS. MAY REPEAT ONCE IN 5-15 MINUTES IF RESPONSE TO INITIAL DOSE IS INADEQUATE.      LORazepam (ATIVAN) 0.5 MG tablet Take 1 tablet (0.5 mg) by mouth daily as needed for anxiety (Flying, Dental Procedure) 5 tablet 0    semaglutide-weight management (WEGOVY) 0.25 MG/0.5ML pen Inject 0.5 mLs (0.25 mg) subcutaneously once a week. 2 mL 1    Semaglutide-Weight Management (WEGOVY) 0.5 MG/0.5ML pen Inject 0.5 mg subcutaneously once a week. 2 mL 1    Semaglutide-Weight Management (WEGOVY) 1 MG/0.5ML pen Inject 1 mg subcutaneously once a week. 2 mL 1    valACYclovir (VALTREX) 1000 mg tablet Take 1 tablet by mouth daily.         ALLERGIES:   Allergies   Allergen Reactions    Bee Venom Anaphylaxis     Other reaction(s): Other, see comments  Unsure, swelling,   Other reaction(s): *Unknown    Seasonal Allergies        ROS:    HEENT  H/O glaucoma:  no  Cardiovascular  CAD:   no  Palpitations:   Yes had syncope in 2017. Was advised to increase salt and wear compression socks, no issues since.  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  H/O eating disorder: no  Endocrine  PMH/FMH of MTC or MEN2:  no  Neurologic:  H/O seizures:   no  Headaches:  Yes, managed with water intake or caffeine  Memory Impairment:  no    H/O kidney stones:  no  Kidney disease:  no  Current birth control:  Post menopause    LABS/RECORDS REVIEWED:  Vitamin D Deficiency screening   Date Value Ref Range Status   07/24/2017 58 20 - 75 ug/L Final     Comment:     Season, race, dietary intake, and treatment affect the concentration of   25-hydroxy-Vitamin D. Values may decrease during winter months and increase    during summer months. Values 20-29 ug/L may indicate Vitamin D insufficiency   and values <20 ug/L may indicate Vitamin D deficiency.   Vitamin D determination is routinely performed by an immunoassay specific for   25 hydroxyvitamin D3.  If an individual is on vitamin D2 (ergocalciferol)   supplementation, please specify 25 OH vitamin D2 and D3 level determination   by   LCMSMS test [id number].       TSH   Date Value Ref Range Status   07/07/2018 1.82 0.40 - 4.00 mU/L Final     Sodium   Date Value Ref Range Status   10/18/2023 141 135 - 145 mmol/L Final     Comment:     Reference intervals for this test were updated on 09/26/2023 to more accurately reflect our healthy population. There may be differences in the flagging of prior results with similar values performed with this method. Interpretation of those prior results can be made in the context of the updated reference intervals.    07/07/2018 141 133 - 144 mmol/L Final     Potassium   Date Value Ref Range Status   10/18/2023 3.9 3.4 - 5.3 mmol/L Final   07/07/2018 3.8 3.4 - 5.3 mmol/L Final     Chloride   Date Value Ref Range Status   10/18/2023 106 98 - 107 mmol/L Final   07/07/2018 109 94 - 109 mmol/L Final     Carbon Dioxide   Date Value Ref Range Status   07/07/2018 24 20 - 32 mmol/L Final     Carbon Dioxide (CO2)   Date Value Ref Range Status   10/18/2023 26 22 - 29 mmol/L Final     Anion Gap   Date Value Ref Range Status   10/18/2023 9 7 - 15 mmol/L Final   07/07/2018 8 3 - 14 mmol/L Final     Glucose   Date Value Ref Range Status   10/18/2023 95 70 - 99 mg/dL Final   07/07/2018 92 70 - 99 mg/dL Final     Urea Nitrogen   Date Value Ref Range Status   10/18/2023 19.8 6.0 - 20.0 mg/dL Final   07/07/2018 14 7 - 30 mg/dL Final     Creatinine   Date Value Ref Range Status   10/18/2023 0.88 0.51 - 0.95 mg/dL Final   07/07/2018 0.89 0.52 - 1.04 mg/dL Final     GFR Estimate   Date Value Ref Range Status   10/18/2023 77 >60 mL/min/1.73m2 Final   07/07/2018 67 >60  mL/min/1.7m2 Final     Comment:     Non  GFR Calc     Calcium   Date Value Ref Range Status   10/18/2023 9.2 8.6 - 10.0 mg/dL Final   07/07/2018 8.5 8.5 - 10.1 mg/dL Final     ALT   Date Value Ref Range Status   10/18/2023 18 0 - 50 U/L Final     Comment:     Reference intervals for this test were updated on 6/12/2023 to more accurately reflect our healthy population. There may be differences in the flagging of prior results with similar values performed with this method. Interpretation of those prior results can be made in the context of the updated reference intervals.     06/21/2016 23 0 - 50 U/L Final     AST   Date Value Ref Range Status   10/18/2023 20 0 - 45 U/L Final     Comment:     Reference intervals for this test were updated on 6/12/2023 to more accurately reflect our healthy population. There may be differences in the flagging of prior results with similar values performed with this method. Interpretation of those prior results can be made in the context of the updated reference intervals.   06/21/2016 9 0 - 45 U/L Final     Cholesterol   Date Value Ref Range Status   06/23/2016 198 <200 mg/dL Final     HDL Cholesterol   Date Value Ref Range Status   06/23/2016 41 (L) >49 mg/dL Final     LDL Cholesterol Calculated   Date Value Ref Range Status   06/23/2016 141 (H) <100 mg/dL Final     Comment:     Above desirable:  100-129 mg/dl   Borderline High:  130-159 mg/dL   High:             160-189 mg/dL   Very high:       >189 mg/dl       Triglycerides   Date Value Ref Range Status   06/23/2016 81 <150 mg/dL Final     Hemoglobin   Date Value Ref Range Status   10/18/2023 14.3 11.7 - 15.7 g/dL Final   07/07/2018 15.3 11.7 - 15.7 g/dL Final           BP Readings from Last 6 Encounters:   07/22/25 101/66   06/13/25 117/71   04/22/25 100/70   04/11/25 112/78   08/01/24 118/82   02/12/24 111/61       Pulse Readings from Last 6 Encounters:   07/22/25 81   06/13/25 69   04/22/25 64   04/11/25 82  "  08/01/24 60   02/12/24 86       PHYSICAL EXAM:  /66   Pulse 81   Ht 5' 5.75\" (1.67 m)   Wt 204 lb (92.5 kg)   LMP  (LMP Unknown)   BMI 33.18 kg/m    GENERAL: Healthy, alert and no distress  RESP: No audible wheeze, cough, or visible cyanosis.  No visible retractions or increased work of breathing.    SKIN: Visible skin clear. No significant rash, abnormal pigmentation or lesions.  PSYCH: Mentation appears normal, affect normal/bright, judgement and insight intact, normal speech and appearance well-groomed.    COUNSELING:   Reviewed obesity as a chronic disease and comprehensive management stratagies.      We discussed Bariatric Basics including:  -eating 3 meals daily  -eating protein first  -eating slowly, chewing food well  -avoiding/limiting calorie containing beverages  -limiting carbohydrates and changing to whole grains  -limiting restaurant or cafeteria eating to twice a week or less    We discussed the importance of restorative sleep and stress management in maintaining a healthy weight.  We discussed insulin resistance and glycemic index as it relates to appetite and weight control.   We discussed the importance of physical activity including cardiovascular and strength training in maintaining a healthier weight and explored viable options.  Patient education of above written in AVS.      Sincerely,    Shelby Little NP         "

## 2025-07-16 ENCOUNTER — VIRTUAL VISIT (OUTPATIENT)
Dept: PSYCHOLOGY | Facility: CLINIC | Age: 55
End: 2025-07-16
Payer: MEDICAID

## 2025-07-16 DIAGNOSIS — F43.22 ADJUSTMENT DISORDER WITH ANXIOUS MOOD: ICD-10-CM

## 2025-07-16 DIAGNOSIS — F33.1 MAJOR DEPRESSIVE DISORDER, RECURRENT EPISODE, MODERATE (H): ICD-10-CM

## 2025-07-16 DIAGNOSIS — F90.0 ADHD (ATTENTION DEFICIT HYPERACTIVITY DISORDER), INATTENTIVE TYPE: Primary | ICD-10-CM

## 2025-07-16 PROCEDURE — 90837 PSYTX W PT 60 MINUTES: CPT | Mod: 95 | Performed by: SOCIAL WORKER

## 2025-07-16 ASSESSMENT — ANXIETY QUESTIONNAIRES
8. IF YOU CHECKED OFF ANY PROBLEMS, HOW DIFFICULT HAVE THESE MADE IT FOR YOU TO DO YOUR WORK, TAKE CARE OF THINGS AT HOME, OR GET ALONG WITH OTHER PEOPLE?: VERY DIFFICULT
3. WORRYING TOO MUCH ABOUT DIFFERENT THINGS: NOT AT ALL
5. BEING SO RESTLESS THAT IT IS HARD TO SIT STILL: NOT AT ALL
2. NOT BEING ABLE TO STOP OR CONTROL WORRYING: SEVERAL DAYS
GAD7 TOTAL SCORE: 2
1. FEELING NERVOUS, ANXIOUS, OR ON EDGE: SEVERAL DAYS
7. FEELING AFRAID AS IF SOMETHING AWFUL MIGHT HAPPEN: NOT AT ALL
GAD7 TOTAL SCORE: 2
7. FEELING AFRAID AS IF SOMETHING AWFUL MIGHT HAPPEN: NOT AT ALL
6. BECOMING EASILY ANNOYED OR IRRITABLE: NOT AT ALL
IF YOU CHECKED OFF ANY PROBLEMS ON THIS QUESTIONNAIRE, HOW DIFFICULT HAVE THESE PROBLEMS MADE IT FOR YOU TO DO YOUR WORK, TAKE CARE OF THINGS AT HOME, OR GET ALONG WITH OTHER PEOPLE: VERY DIFFICULT
GAD7 TOTAL SCORE: 2
4. TROUBLE RELAXING: NOT AT ALL

## 2025-07-16 NOTE — PROGRESS NOTES
[facility]                                     Progress Note    Patient Name: Dalia Hart  Date: 2025       Service Type: Individual      Session Start Time: 1:11 PM Session End Time: 2:13 PM  Session Length:   53+ min    Session #: 37    Attendees: Client attended alone    Service Modality: Video Visit via [email or web address]      Provider verified identity through the following two step process.  Patient provided:  Patient  and Patient is known previously to provider     Telemedicine Visit: The patient's condition can be safely assessed and treated via synchronous audio and visual telemedicine encounter.      Reason for Telemedicine Visit: Patient convenience (e.g. access to timely appointments / distance to available provider)    Originating Site (Patient Location): Patient's other car    Distant Site (Provider Location): Provider Remote Setting- Home Office    Consent:  The patient/guardian has verbally consented to: the potential risks and benefits of telemedicine (video visit) versus in person care; bill my insurance or make self-payment for services provided; and responsibility for payment of non-covered services.   Distant Location (Provider):  On-site    As the provider I attest to compliance with applicable laws and regulations related to telemedicine.    DATA  Extended Session (53+ minutes): PROLONGED SERVICE IN THE OUTPATIENT SETTING REQUIRING DIRECT (FACE-TO-FACE) PATIENT CONTACT BEYOND THE USUAL SERVICE:    - Longer session due to limited access to mental health appointments and necessity to address patient's distress / complexity    - Patient's presenting concerns require more intensive intervention than could be completed within the usual service  Interactive Complexity: No  Crisis: No          Progress Since Last Session (Related to Symptoms / Goals / Homework):   Symptoms: No change in symptoms reported. Patient continues to present with ADHD and adjustment disorder  related symptoms.    Homework: N/A - due to gap in services.       Episode of Care Goals: Satisfactory progress - PREPARATION (Decided to change - considering how); Intervened by negotiating a change plan and determining options / strategies for behavior change, identifying triggers, exploring social supports, and working towards setting a date to begin behavior change     Current / Ongoing Stressors and Concerns:   The patient continues to report and present with: ADHD, adjustment disorder with anxious mood related symptoms, and depression related symptoms. The patient reports that she is having a difficult time completing all of the tasks she needs to complete. The patient identified the following stressors and concerns: patient lost one of her jobs, patient has a renter that she is struggling to evict from her property, pain returned which she believes is stress-induced (the patient reports that she feels like she has full body arthritis), and patient is grieving the loss of a loved one (father of her children).          Treatment Objective(s) Addressed in This Session:   Identify triggers/ fears / thoughts that contribute to feeling anxious  Decrease frequency and intensity of feeling down, depressed, hopeless  Patient will use 1 or more coping skills for anxiety management each week.  Identify negative self-talk and behaviors: challenge core beliefs, myths, and actions       Intervention:   Client Centered   Psychodynamic - processed through internal experiences related to identified stressors   Solutions Focused: explored possible solutions to address identified stressors   Validation and Normalization   Co-develop short-term goals and review progress in session.       Assessments completed prior to visit: None  PHQ9:       11/13/2015     3:23 PM 8/29/2016    11:24 AM 2/5/2024     2:48 PM 3/10/2024     1:52 PM 5/10/2024    10:00 AM 4/21/2025     6:52 PM 7/2/2025    10:17 AM   PHQ-9 SCORE   PHQ-9 Total Score  [place]   5 (Mild depression) 4 (Minimal depression) 4 (Minimal depression) 3 (Minimal depression) 6 (Mild depression)   PHQ-9 Total Score 7  0  5 4    4    4 4 3  6        Patient-reported    Data saved with a previous flowsheet row definition     GAD7:       6/27/2011    10:37 AM 3/10/2024     1:53 PM 7/16/2025    12:14 PM   ROSMERY-7 SCORE   Total Score 9     Total Score  2 (minimal anxiety) 2 (minimal anxiety)   Total Score  2    2    2 2        Patient-reported      PROMIS-10 Scores        12/3/2024    12:28 PM 7/2/2025    10:19 AM 7/16/2025    12:15 PM   PROMIS-10 Total Score w/o Sub Scores   PROMIS TOTAL - SUBSCORES 26  23  19        Patient-reported          ASSESSMENT: Current Emotional / Mental Status (status of significant symptoms):   Risk status (Self / Other harm or suicidal ideation)   Patient denies current fears or concerns for personal safety.   Patient denies current or recent suicidal ideation or behaviors.   Patient denies current or recent homicidal ideation or behaviors.   Patient denies current or recent self injurious behavior or ideation.   Patient denies other safety concerns.   Patient reports there has been no change in risk factors since their last session.     Patient reports there has been no change in protective factors since their last session.     Recommended that patient call 911 or go to the local ED should there be a change in any of these risk factors     Appearance:   Appropriate    Eye Contact:   Good    Psychomotor Behavior: Normal    Attitude:   Cooperative  Interested Friendly Pleasant Attentive   Orientation:   All   Speech    Rate / Production: Normal/ Responsive    Volume:  Normal    Mood:    Anxious  Depressed  Frustrated Overwhelmed   Affect:    Appropriate    Thought Content:  Clear    Thought Form:  Coherent  Logical    Insight:    Good      Medication Review:   No changes to current psychiatric medication(s)     Medication Compliance:   Yes     Changes in Health  Issues:   None reported     Chemical Use Review:   Substance Use: Chemical use reviewed, no active concerns identified      Tobacco Use: Not addressed in visit.     Diagnosis:  1. ADHD (attention deficit hyperactivity disorder), inattentive type    2. Major depressive disorder, recurrent episode, moderate (H)    3. Adjustment disorder with anxious mood        Collateral Reports Completed:   Not Applicable    PLAN: (Patient Tasks / Therapist Tasks / Other  Patient plans to apply for unemployment.   Patient plans to take 3 walks.  Patient plans to return for follow up: 7/22/2025      Aidee Conti, [facility]                                                      ______________________________________________________________________    Individual Treatment Plan    Patient's Name: Dalia Hart  YOB: 1970    Date of Creation: 7/13/2023    Date Treatment Plan Last Reviewed/Revised: 7/16/2025  Treatment plan resumed after gap in services from October 2024-July 2025 due to patient losing her health insurance after losing her job      DSM5 Diagnoses:   1. Major depressive disorder, recurrent episode, moderate (H)    2. Adjustment disorder with anxious mood      Psychosocial / Contextual Factors: Patient identified the following stressors/concerns: financial, patient recently lost her job, and parenting stress. Patent report that she is not getting a lot of sleep and reports that she does not always get regular sleep.   PROMIS (reviewed every 90 days): 19    Referral / Collaboration:  Referral to another professional/service is not indicated at this time..    Anticipated number of session for this episode of care: 9-12 sessions  Anticipation frequency of session: Weekly  Anticipated Duration of each session: 38-52 minutes  Treatment plan will be reviewed in 90 days or when goals have been changed.       MeasurableTreatment Goal(s) related to diagnosis / functional impairment(s)  Goal 1: Decrease severity of  depression symptoms.       Objective #A (Patient Action)    Patient will Decrease frequency and intensity of feeling down, depressed, hopeless.  Status: New - Date: 7/13/2023 Continued dates: 10/19/2023, 1/18/2024, 4/24/2024, 8/13/2024  Treatment plan resumed: 7/16/2025 after gap in services from October 2024-July 2025 due to patient losing her health insurance after losing her job    Intervention(s)  Client Centered  CBT  Psycho-dynamic  Internal Family Systems  Psycho-education    Objective #B  Patient will Improve quantity and quality of night time sleep / decrease daytime naps.  Status: New - Date: 7/13/2023 Continued dates: 10/19/2023, 1/18/2024, 4/24/2024, 8/13/2024  Treatment plan resumed: 7/16/2025 after gap in services from October 2024-July 2025 due to patient losing her health insurance after losing her job    Intervention(s)  Client Centered  CBT  Internal Family Systems  Psycho-education  Mindulness    Objective #C  Patient will Identify negative self-talk and behaviors: challenge core beliefs, myths, and actions.  Status: New - Date: 7/13/2023 Continued dates: 10/19/2023, 1/18/2024, 4/24/2024, 8/13/2024  Treatment plan resumed: 7/16/2025 after gap in services from October 2024-July 2025 due to patient losing her health insurance after losing her job    Intervention(s)  Client Centered  CBT  Internal Family Systems  Psycho-education    Objective #D  Improve concentration, focus, and mindfulness in daily activities   Status: New - Date: 10/09/2024  Treatment plan resumed: 7/16/2025 after gap in services from October 2024-July 2025 due to patient losing her health insurance after losing her job    Intervention(s)  Client Centered  CBT  Internal Family Systems  Psycho-education  Solutions Focused  Motivational Interviewing    Goal 2: Decrease severity of anxiety related symptoms.       Objective #A  Patient will identify triggers/ fears / thoughts that contribute to feeling anxious.   Status: New - Date:  7/13/2023 Continued dates: 10/19/2023, 1/18/2024, 4/24/2024, 8/13/2024  Treatment plan resumed: 7/16/2025 after gap in services from October 2024-July 2025 due to patient losing her health insurance after losing her job      Intervention(s)  Psychodynamic  CBT  Internal Family Systems    Objective #B  Patient will use 1 or more coping skills for anxiety management each week.   Status: New - Date: 7/13/2023 Continued dates: 10/19/2023, 1/18/2024, 4/24/2024, 8/13/2024  Treatment plan resumed: 7/16/2025 after gap in services from October 2024-July 2025 due to patient losing her health insurance after losing her job    Intervention(s)  Therapist will teach emotional regulation skills. (mindfulness, breathing techniques, progressive muscle relaxation).  Co-develop short-term goals and review progress in session.   CBT  Internal Family Systems  Mindfulness      Goal 3: Improve interpersonal relationships.       Objective #A  Patient will compile a list of boundaries that they would like to set with others.  Status: New - Date: 7/13/2023 Continued dates: 10/19/2023, 1/18/2024, 4/24/2024, 8/13/2024  Treatment plan resumed: 7/16/2025 after gap in services from October 2024-July 2025 due to patient losing her health insurance after losing her job    Intervention(s)  Client Centered  Motivational Interviewing  Psycho-education  Psychodynamic  Internal Family Systems.     Objective #B  Patient will practice setting boundaries 1 or more times each week.    Status: New - Date: 7/13/2023 Continued dates: 10/19/2023, 1/18/2024, 4/24/2024, 8/13/2024  Treatment plan resumed: 7/16/2025 after gap in services from October 2024-July 2025 due to patient losing her health insurance after losing her job    Intervention(s)  Client Centered  Motivational Interviewing  Co-develop short-term goals and review progress in session.         Patient has reviewed and agreed to the above plan.      Aidee Conti, [facility] July 16, 2025

## 2025-07-22 ENCOUNTER — VIRTUAL VISIT (OUTPATIENT)
Dept: PSYCHOLOGY | Facility: CLINIC | Age: 55
End: 2025-07-22
Payer: MEDICAID

## 2025-07-22 ENCOUNTER — OFFICE VISIT (OUTPATIENT)
Dept: SURGERY | Facility: CLINIC | Age: 55
End: 2025-07-22
Payer: MEDICAID

## 2025-07-22 VITALS
SYSTOLIC BLOOD PRESSURE: 101 MMHG | DIASTOLIC BLOOD PRESSURE: 66 MMHG | HEART RATE: 81 BPM | HEIGHT: 66 IN | WEIGHT: 204 LBS | BODY MASS INDEX: 32.78 KG/M2

## 2025-07-22 DIAGNOSIS — E66.09 CLASS 1 OBESITY DUE TO EXCESS CALORIES WITHOUT SERIOUS COMORBIDITY WITH BODY MASS INDEX (BMI) OF 33.0 TO 33.9 IN ADULT: ICD-10-CM

## 2025-07-22 DIAGNOSIS — F33.1 MAJOR DEPRESSIVE DISORDER, RECURRENT EPISODE, MODERATE (H): ICD-10-CM

## 2025-07-22 DIAGNOSIS — E66.811 CLASS 1 OBESITY DUE TO EXCESS CALORIES WITHOUT SERIOUS COMORBIDITY WITH BODY MASS INDEX (BMI) OF 33.0 TO 33.9 IN ADULT: ICD-10-CM

## 2025-07-22 DIAGNOSIS — F43.22 ADJUSTMENT DISORDER WITH ANXIOUS MOOD: Primary | ICD-10-CM

## 2025-07-22 PROCEDURE — 90834 PSYTX W PT 45 MINUTES: CPT | Mod: 95 | Performed by: SOCIAL WORKER

## 2025-07-22 PROCEDURE — 3074F SYST BP LT 130 MM HG: CPT | Performed by: NURSE PRACTITIONER

## 2025-07-22 PROCEDURE — 99204 OFFICE O/P NEW MOD 45 MIN: CPT | Performed by: NURSE PRACTITIONER

## 2025-07-22 PROCEDURE — G2211 COMPLEX E/M VISIT ADD ON: HCPCS | Performed by: NURSE PRACTITIONER

## 2025-07-22 PROCEDURE — 3078F DIAST BP <80 MM HG: CPT | Performed by: NURSE PRACTITIONER

## 2025-07-22 ASSESSMENT — SLEEP AND FATIGUE QUESTIONNAIRES
HOW LIKELY ARE YOU TO NOD OFF OR FALL ASLEEP WHILE SITTING INACTIVE IN A PUBLIC PLACE: WOULD NEVER DOZE
HOW LIKELY ARE YOU TO NOD OFF OR FALL ASLEEP WHILE LYING DOWN TO REST IN THE AFTERNOON WHEN CIRCUMSTANCES PERMIT: MODERATE CHANCE OF DOZING
HOW LIKELY ARE YOU TO NOD OFF OR FALL ASLEEP WHEN YOU ARE A PASSENGER IN A CAR FOR AN HOUR WITHOUT A BREAK: SLIGHT CHANCE OF DOZING
HOW LIKELY ARE YOU TO NOD OFF OR FALL ASLEEP IN A CAR, WHILE STOPPED FOR A FEW MINUTES IN TRAFFIC: WOULD NEVER DOZE
HOW LIKELY ARE YOU TO NOD OFF OR FALL ASLEEP WHILE SITTING AND READING: MODERATE CHANCE OF DOZING
HOW LIKELY ARE YOU TO NOD OFF OR FALL ASLEEP WHILE SITTING QUIETLY AFTER LUNCH WITHOUT ALCOHOL: SLIGHT CHANCE OF DOZING
HOW LIKELY ARE YOU TO NOD OFF OR FALL ASLEEP WHILE WATCHING TV: HIGH CHANCE OF DOZING
HOW LIKELY ARE YOU TO NOD OFF OR FALL ASLEEP WHILE SITTING AND TALKING TO SOMEONE: WOULD NEVER DOZE

## 2025-07-22 NOTE — ASSESSMENT & PLAN NOTE
7/22/2025 initial evaluation. Pt will start Wegovy titration. Discussed mechanism of action, common side effects, titration guidelines. Medication handout given in AVS. Discussed long term use and variable rate of weight loss.    Reminded patient of pending labs ordered by PCP.  We reviewed recommendations for muscle conservation including eating three meals daily, good protein intake, and strength training.   Dietician appointment 8/8/25.

## 2025-07-22 NOTE — PATIENT INSTRUCTIONS
"It was nice to talk with you today! Below is the plan discussed.-  LENI Ryan      Pt Instructions:  Labs ordered by your primary care provider.  Please call [phone] to set up a lab appt.  Start Wegovy (semaglutide)   0.25 mg once weekly for 4-8 weeks,   if tolerating increase to 0.5 mg weekly for 4-8 weeks,   if tolerating increase to 1 mg weekly  May use famotidine 20 mg twice daily as needed for nausea/heartburn when starting the medication.      Goals:  Eat within 1 hour of waking. Try to eat every 4-6 hours. Prioritize protein 1st and fruit/veggies/fiber 2nd.  Strive to drink 64oz-80oz water throughout the day.  Work your way up to 150-300 minutes of activity per week. Strength train twice a week to preserve muscle.     Follow up:    Call [phone] to schedule next visit in October with Shelby Little, NP  Dietician appt 8/8/25                             Obesity is a complex chronic disease    Obesity is a brain disease that causes dysregulation of our energy system.  It is not a character flaw it is a chemistry flaw.      It has many causes.  80% is genetic.  These can be influenced by factors like an altered microbiome (microbes in your gut), endocrine disrupting chemicals, sedentary lifestyle, low nutrient/ high calorie foods, and weight promoting medications.     Energy homeostasis is tightly regulated by the central nervous system. The hypothalamus is the primary center for the regulation of energy balance.  The thermostat is influenced by signals in response to fullness, hunger, and others. With obesity are several impairments in those signals to your hypothalamus.  It causes your thermostat \"set point\"  is be too high.      When you lose weight, you body's response it to defend its set point. Signals will be sent to your hypothalamus to decrease your metabolism, increase hunger, and decrease feeling of fullness.  This ultimately drives your weight back to you set range.     Medications can " help regulate those signals.  If you respond well to obesity medications, these should be used lifelong.  Otherwise, if removed, your body will go back to that dysregulated state and defend its set point.  (You will regain your weight.)    Obesity is a chronic disease. It is hard to treat but we are learning more every day.  Treatment is improving by leaps and bounds.  The best thing you can do is continue close follow up with your obesity medicine provider.  Together we can tackle this disease.     WEGOVY (semaglutide)      Wegovy (semaglutide) injection 2.4 mg is an injectable prescription medicine used for adults with obesity (BMI >=30) or overweight (excess weight) (BMI >=27) who also have weight-related medical problems to help them lose weight and keep the weight off.  It is a GLP-1 agonist medication. GLP1 agonists stimulate the hormone GLP-1 in your body to allow you to feel full quickly and stay full longer.    Directions:  Start Wegovy (semaglutide) 0.25 mg once weekly for 4 weeks, then if tolerating increase to 0.5 mg weekly for 4 weeks, then if tolerating increase to 1 mg weekly for 4 weeks, then if tolerating increase to 1.7 mg weekly for 4 weeks, then if tolerating increase to 2.4 mg weekly thereafter.      -Each Wegovy pen is a once weekly single-dose prefilled pen with a pen injector already built within the pen. Discard the Wegovy pen after use in sharps container.     Common Side Effects:    nausea, headache, diarrhea, stomach upset.  If these become unmanageable call or mychart.    Serious Side effects:   Pancreatitis (inflammation of the pancreas) has been associated with this type of medication, but is very rare.Symptoms of pancreatitis include: Pain in your upper stomach area which may travel to your back and be worse after eating.     Tips to help with side effects:  For Nausea/Heartburn:  Eat small, protein focused meals throughout the day  Stay hydrated.-The medication can decrease your  drive for thirst  Eat slowly. STOP at the first sign of satisfaction  Eat at regular intervals, letting hours pass without eating- can cause nausea.  AVOID foods that are high in fat and sugar .      For constipation:  Stay hydrated and make sure you are moving.    Miralax 1 scoop/packet up daily    Ducosate Sodium 1 pill twice daily (stool softener)    Storage:   Store the prescription in the refrigerator. Once it is time to use the Wegovy pen, you can keep the pen at room temperature and it is good for up to 28 days at room temperature.     How to inject:  For a video on how to use the pen please go to:  https://www.[employer]/about-wegovy/how-to-use-the-wegovy-pen.html#itemTwo

## 2025-07-22 NOTE — PROGRESS NOTES
[facility]                                     Progress Note    Patient Name: Dalia Hart  Date: 2025       Service Type: Individual      Session Start Time: 9:32 AM Session End Time: 10:12 AM  Session Length:   39 min    Session #: 38    Attendees: Client attended alone    Service Modality: [facility]      Provider verified identity through the following two step process.  Patient provided:  Patient  and Patient is known previously to provider     Telemedicine Visit: The patient's condition can be safely assessed and treated via synchronous audio and visual telemedicine encounter.      Reason for Telemedicine Visit: Patient convenience (e.g. access to timely appointments / distance to available provider)    Originating Site (Patient Location): Patient's home    Distant Site (Provider Location): Provider Remote Setting- Home Office    Consent:  The patient/guardian has verbally consented to: the potential risks and benefits of telemedicine (video visit) versus in person care; bill my insurance or make self-payment for services provided; and responsibility for payment of non-covered services.   Distant Location (Provider):  Off-site    As the provider I attest to compliance with applicable laws and regulations related to telemedicine.    DATA  Extended Session (53+ minutes): PROLONGED SERVICE IN THE OUTPATIENT SETTING REQUIRING DIRECT (FACE-TO-FACE) PATIENT CONTACT BEYOND THE USUAL SERVICE:    - Patient's presenting concerns require more intensive intervention than could be completed within the usual service  Interactive Complexity: No  Crisis: No          Progress Since Last Session (Related to Symptoms / Goals / Homework):   Symptoms: Patient reports an increase in anxiety symptoms in response to identifiable stressors. Patient continues to present with ADHD and adjustment disorder related symptoms.    Homework: Partially completed      Episode of Care Goals:  Satisfactory progress - PREPARATION (Decided to change - considering how); Intervened by negotiating a change plan and determining options / strategies for behavior change, identifying triggers, exploring social supports, and working towards setting a date to begin behavior change     Current / Ongoing Stressors and Concerns:  The patient identified the following stressors and concerns: interpersonal stress, stress related to current living situation, financial stress, and stress related to current living situation and her renter (who lives next door and who she has been actively trying to evict). The patient reports that she is having a difficult time relaxing, is having trouble sleeping, her hair is falling out, and physical pain. The patient reports that she is so upset about the situation and reports that it is difficult for her to find the motivation to take care of her responsibilities. The patient also reports that she is concerned about how current stressors are impacting her 19 year old daughter, who lives with her.          Treatment Objective(s) Addressed in This Session:   Continued to address:  Identify triggers/ fears / thoughts that contribute to feeling anxious  Decrease frequency and intensity of feeling down, depressed, hopeless  Patient will use 1 or more coping skills for anxiety management each week.  Identify negative self-talk and behaviors: challenge core beliefs, myths, and actions       Intervention:   Client Centered   Psychodynamic - processed through internal experiences related to identified stressors   Solutions Focused: explored possible solutions to address identified stressors   Validation and Normalization   Co-develop short-term goals and review progress in session.       Assessments completed prior to visit: None  PHQ9:       11/13/2015     3:23 PM 8/29/2016    11:24 AM 2/5/2024     2:48 PM 3/10/2024     1:52 PM 5/10/2024    10:00 AM 4/21/2025     6:52 PM 7/2/2025    10:17 AM    PHQ-9 SCORE   PHQ-9 Total Score [facility]   5 (Mild depression) 4 (Minimal depression) 4 (Minimal depression) 3 (Minimal depression) 6 (Mild depression)   PHQ-9 Total Score 7  0  5 4    4    4 4 3  6        Patient-reported    Data saved with a previous flowsheet row definition     GAD7:       6/27/2011    10:37 AM 3/10/2024     1:53 PM 7/16/2025    12:14 PM   ROSMERY-7 SCORE   Total Score 9     Total Score  2 (minimal anxiety) 2 (minimal anxiety)   Total Score  2    2    2 2        Patient-reported      PROMIS-10 Scores        12/3/2024    12:28 PM 7/2/2025    10:19 AM 7/16/2025    12:15 PM   PROMIS-10 Total Score w/o Sub Scores   PROMIS TOTAL - SUBSCORES 26  23  19        Patient-reported          ASSESSMENT: Current Emotional / Mental Status (status of significant symptoms):   Risk status (Self / Other harm or suicidal ideation)   Patient denies current fears or concerns for personal safety.   Patient denies current or recent suicidal ideation or behaviors.   Patient denies current or recent homicidal ideation or behaviors.   Patient denies current or recent self injurious behavior or ideation.   Patient denies other safety concerns.   Patient reports there has been no change in risk factors since their last session.     Patient reports there has been no change in protective factors since their last session.     Recommended that patient call 911 or go to the local ED should there be a change in any of these risk factors     Appearance:   Appropriate    Eye Contact:   Good    Psychomotor Behavior: Normal    Attitude:   Cooperative  Interested Friendly Pleasant Attentive   Orientation:   All   Speech    Rate / Production: Normal/ Responsive    Volume:  Normal    Mood:    Anxious  Depressed  Frustrated Overwhelmed   Affect:    Appropriate    Thought Content:  Clear    Thought Form:  Coherent  Logical    Insight:    Good      Medication Review:   No changes to current psychiatric medication(s)     Medication  "Compliance:   Yes     Changes in Health Issues:   None reported     Chemical Use Review:   Substance Use: Chemical use reviewed, no active concerns identified      Tobacco Use: Not addressed in visit.     Diagnosis:  1. Adjustment disorder with anxious mood    2. Major depressive disorder, recurrent episode, moderate (H)        Collateral Reports Completed:   Not Applicable    PLAN: (Patient Tasks / Therapist Tasks / Other  Patient plans to clean her room and do yard work.   Patient will consider spending time in nature or will engage in a restorative action to \"refill her cup\".  Patient plans to apply for unemployment.   Patient plans to take 3 walks.  Patient plans to return for follow up: 8/01/2025      Aidee Conti, [facility]                                                      ______________________________________________________________________    Individual Treatment Plan    Patient's Name: Dalia Hart  YOB: 1970    Date of Creation: 7/13/2023    Date Treatment Plan Last Reviewed/Revised: 7/16/2025  Treatment plan resumed after gap in services from October 2024-July 2025 due to patient losing her health insurance after losing her job      DSM5 Diagnoses:   1. Major depressive disorder, recurrent episode, moderate (H)    2. Adjustment disorder with anxious mood      Psychosocial / Contextual Factors: Patient identified the following stressors/concerns: financial, patient recently lost her job, and parenting stress. Patent report that she is not getting a lot of sleep and reports that she does not always get regular sleep.   PROMIS (reviewed every 90 days): 19    Referral / Collaboration:  Referral to another professional/service is not indicated at this time..    Anticipated number of session for this episode of care: 9-12 sessions  Anticipation frequency of session: Weekly  Anticipated Duration of each session: 38-52 minutes  Treatment plan will be reviewed in 90 days or when goals have " been changed.       MeasurableTreatment Goal(s) related to diagnosis / functional impairment(s)  Goal 1: Decrease severity of depression symptoms.       Objective #A (Patient Action)    Patient will Decrease frequency and intensity of feeling down, depressed, hopeless.  Status: New - Date: 7/13/2023 Continued dates: 10/19/2023, 1/18/2024, 4/24/2024, 8/13/2024  Treatment plan resumed: 7/16/2025 after gap in services from October 2024-July 2025 due to patient losing her health insurance after losing her job    Intervention(s)  Client Centered  CBT  Psycho-dynamic  Internal Family Systems  Psycho-education    Objective #B  Patient will Improve quantity and quality of night time sleep / decrease daytime naps.  Status: New - Date: 7/13/2023 Continued dates: 10/19/2023, 1/18/2024, 4/24/2024, 8/13/2024  Treatment plan resumed: 7/16/2025 after gap in services from October 2024-July 2025 due to patient losing her health insurance after losing her job    Intervention(s)  Client Centered  CBT  Internal Family Systems  Psycho-education  Mindulness    Objective #C  Patient will Identify negative self-talk and behaviors: challenge core beliefs, myths, and actions.  Status: New - Date: 7/13/2023 Continued dates: 10/19/2023, 1/18/2024, 4/24/2024, 8/13/2024  Treatment plan resumed: 7/16/2025 after gap in services from October 2024-July 2025 due to patient losing her health insurance after losing her job    Intervention(s)  Client Centered  CBT  Internal Family Systems  Psycho-education    Objective #D  Improve concentration, focus, and mindfulness in daily activities   Status: New - Date: 10/09/2024  Treatment plan resumed: 7/16/2025 after gap in services from October 2024-July 2025 due to patient losing her health insurance after losing her job    Intervention(s)  Client Centered  CBT  Internal Family Systems  Psycho-education  Solutions Focused  Motivational Interviewing    Goal 2: Decrease severity of anxiety related symptoms.        Objective #A  Patient will identify triggers/ fears / thoughts that contribute to feeling anxious.   Status: New - Date: 7/13/2023 Continued dates: 10/19/2023, 1/18/2024, 4/24/2024, 8/13/2024  Treatment plan resumed: 7/16/2025 after gap in services from October 2024-July 2025 due to patient losing her health insurance after losing her job      Intervention(s)  Psychodynamic  CBT  Internal Family Systems    Objective #B  Patient will use 1 or more coping skills for anxiety management each week.   Status: New - Date: 7/13/2023 Continued dates: 10/19/2023, 1/18/2024, 4/24/2024, 8/13/2024  Treatment plan resumed: 7/16/2025 after gap in services from October 2024-July 2025 due to patient losing her health insurance after losing her job    Intervention(s)  Therapist will teach emotional regulation skills. (mindfulness, breathing techniques, progressive muscle relaxation).  Co-develop short-term goals and review progress in session.   CBT  Internal Family Systems  Mindfulness      Goal 3: Improve interpersonal relationships.       Objective #A  Patient will compile a list of boundaries that they would like to set with others.  Status: New - Date: 7/13/2023 Continued dates: 10/19/2023, 1/18/2024, 4/24/2024, 8/13/2024  Treatment plan resumed: 7/16/2025 after gap in services from October 2024-July 2025 due to patient losing her health insurance after losing her job    Intervention(s)  Client Centered  Motivational Interviewing  Psycho-education  Psychodynamic  Internal Family Systems.     Objective #B  Patient will practice setting boundaries 1 or more times each week.    Status: New - Date: 7/13/2023 Continued dates: 10/19/2023, 1/18/2024, 4/24/2024, 8/13/2024  Treatment plan resumed: 7/16/2025 after gap in services from October 2024-July 2025 due to patient losing her health insurance after losing her job    Intervention(s)  Client Centered  Motivational Interviewing  Co-develop short-term goals and review progress in  session.         Patient has reviewed and agreed to the above plan.      Aidee Conti, [facility] July 16, 2025

## 2025-07-23 ENCOUNTER — TELEPHONE (OUTPATIENT)
Dept: SURGERY | Facility: CLINIC | Age: 55
End: 2025-07-23
Payer: MEDICAID

## 2025-07-23 NOTE — TELEPHONE ENCOUNTER
"Prior Authorization Retail Medication Request    Medication/Dose: Wegovy 0.25 MG 0.5 MG and 1 MG    ICD code (if different than what is on RX):  [E66.811, E66.09, Z68.33]     Previously Tried and Failed:  Diet and lifestyle changes    Rationale:  Weight Management    Hx of obesity   Ht: 5' 5.75\"  Body mass index is 33.18 kg/m .   Current Weight:  204 lb (92.5 kg)     Insurance Name:  MEDICAID [place]   Insurance ID:  [id number]       Pharmacy Information (if different than what is on RX)  Name:    [id number] [place], [place] - [address] AT [facility] [address]     Phone:  [phone]    "

## 2025-07-28 NOTE — TELEPHONE ENCOUNTER
Retail Pharmacy Prior Authorization Team   Phone: [phone]    PA Initiation    Medication: WEGOVY 0.25 MG/0.5ML SC SOAJ  Insurance Company: [place] Medicaid ([facility]) - Phone [phone] Fax [phone]  Pharmacy Filling the Rx: [id number] [place], [place] - [address] [place] [facility] [address]  Filling Pharmacy Phone: [phone]  Filling Pharmacy Fax:    Start Date: 7/28/2025    DIANA ROSS (Jessica: [id number])

## 2025-07-28 NOTE — TELEPHONE ENCOUNTER
Prior Authorization Approval    Medication: WEGOVY 0.25 MG/0.5ML SC SOAJ  Authorization Effective Date: 7/28/2025  Authorization Expiration Date: 1/24/2026  Insurance Company: [place] Medicaid ([facility]) - Phone [phone] Fax [phone]  Which Pharmacy is filling the prescription: [id number] - [facility] [address] [facility]  Pharmacy Notified: YES  Patient Notified: YES (faxed approval letter to pharmacy and notified patient via [employer] message)

## 2025-08-01 ENCOUNTER — VIRTUAL VISIT (OUTPATIENT)
Dept: PSYCHOLOGY | Facility: CLINIC | Age: 55
End: 2025-08-01
Payer: MEDICAID

## 2025-08-01 DIAGNOSIS — F33.1 MAJOR DEPRESSIVE DISORDER, RECURRENT EPISODE, MODERATE (H): ICD-10-CM

## 2025-08-01 DIAGNOSIS — F43.22 ADJUSTMENT DISORDER WITH ANXIOUS MOOD: Primary | ICD-10-CM

## 2025-08-01 DIAGNOSIS — F90.0 ADHD (ATTENTION DEFICIT HYPERACTIVITY DISORDER), INATTENTIVE TYPE: ICD-10-CM

## 2025-08-01 PROCEDURE — 90834 PSYTX W PT 45 MINUTES: CPT | Mod: 95 | Performed by: SOCIAL WORKER

## 2025-08-07 ENCOUNTER — VIRTUAL VISIT (OUTPATIENT)
Dept: PSYCHOLOGY | Facility: CLINIC | Age: 55
End: 2025-08-07
Payer: MEDICAID

## 2025-08-07 DIAGNOSIS — F33.1 MAJOR DEPRESSIVE DISORDER, RECURRENT EPISODE, MODERATE (H): ICD-10-CM

## 2025-08-07 DIAGNOSIS — F43.22 ADJUSTMENT DISORDER WITH ANXIOUS MOOD: Primary | ICD-10-CM

## 2025-08-07 PROCEDURE — 90834 PSYTX W PT 45 MINUTES: CPT | Mod: 95 | Performed by: SOCIAL WORKER

## 2025-08-19 ENCOUNTER — VIRTUAL VISIT (OUTPATIENT)
Dept: PSYCHOLOGY | Facility: CLINIC | Age: 55
End: 2025-08-19
Payer: MEDICAID

## 2025-08-19 DIAGNOSIS — F33.1 MAJOR DEPRESSIVE DISORDER, RECURRENT EPISODE, MODERATE (H): ICD-10-CM

## 2025-08-19 DIAGNOSIS — F43.22 ADJUSTMENT DISORDER WITH ANXIOUS MOOD: Primary | ICD-10-CM

## 2025-08-19 PROCEDURE — 90837 PSYTX W PT 60 MINUTES: CPT | Mod: 95 | Performed by: SOCIAL WORKER

## 2025-08-19 ASSESSMENT — PATIENT HEALTH QUESTIONNAIRE - PHQ9
10. IF YOU CHECKED OFF ANY PROBLEMS, HOW DIFFICULT HAVE THESE PROBLEMS MADE IT FOR YOU TO DO YOUR WORK, TAKE CARE OF THINGS AT HOME, OR GET ALONG WITH OTHER PEOPLE: VERY DIFFICULT
SUM OF ALL RESPONSES TO PHQ QUESTIONS 1-9: 9
SUM OF ALL RESPONSES TO PHQ QUESTIONS 1-9: 9

## 2025-08-19 ASSESSMENT — ANXIETY QUESTIONNAIRES
2. NOT BEING ABLE TO STOP OR CONTROL WORRYING: SEVERAL DAYS
1. FEELING NERVOUS, ANXIOUS, OR ON EDGE: MORE THAN HALF THE DAYS
7. FEELING AFRAID AS IF SOMETHING AWFUL MIGHT HAPPEN: MORE THAN HALF THE DAYS
IF YOU CHECKED OFF ANY PROBLEMS ON THIS QUESTIONNAIRE, HOW DIFFICULT HAVE THESE PROBLEMS MADE IT FOR YOU TO DO YOUR WORK, TAKE CARE OF THINGS AT HOME, OR GET ALONG WITH OTHER PEOPLE: VERY DIFFICULT
5. BEING SO RESTLESS THAT IT IS HARD TO SIT STILL: NOT AT ALL
GAD7 TOTAL SCORE: 9
5. BEING SO RESTLESS THAT IT IS HARD TO SIT STILL: NOT AT ALL
GAD7 TOTAL SCORE: 9
8. IF YOU CHECKED OFF ANY PROBLEMS, HOW DIFFICULT HAVE THESE MADE IT FOR YOU TO DO YOUR WORK, TAKE CARE OF THINGS AT HOME, OR GET ALONG WITH OTHER PEOPLE?: VERY DIFFICULT
4. TROUBLE RELAXING: MORE THAN HALF THE DAYS
GAD7 TOTAL SCORE: 9
GAD7 TOTAL SCORE: 9
7. FEELING AFRAID AS IF SOMETHING AWFUL MIGHT HAPPEN: MORE THAN HALF THE DAYS
7. FEELING AFRAID AS IF SOMETHING AWFUL MIGHT HAPPEN: MORE THAN HALF THE DAYS
3. WORRYING TOO MUCH ABOUT DIFFERENT THINGS: NOT AT ALL
8. IF YOU CHECKED OFF ANY PROBLEMS, HOW DIFFICULT HAVE THESE MADE IT FOR YOU TO DO YOUR WORK, TAKE CARE OF THINGS AT HOME, OR GET ALONG WITH OTHER PEOPLE?: VERY DIFFICULT
1. FEELING NERVOUS, ANXIOUS, OR ON EDGE: MORE THAN HALF THE DAYS
IF YOU CHECKED OFF ANY PROBLEMS ON THIS QUESTIONNAIRE, HOW DIFFICULT HAVE THESE PROBLEMS MADE IT FOR YOU TO DO YOUR WORK, TAKE CARE OF THINGS AT HOME, OR GET ALONG WITH OTHER PEOPLE: VERY DIFFICULT
GAD7 TOTAL SCORE: 9
6. BECOMING EASILY ANNOYED OR IRRITABLE: MORE THAN HALF THE DAYS
2. NOT BEING ABLE TO STOP OR CONTROL WORRYING: SEVERAL DAYS
3. WORRYING TOO MUCH ABOUT DIFFERENT THINGS: NOT AT ALL
4. TROUBLE RELAXING: MORE THAN HALF THE DAYS
6. BECOMING EASILY ANNOYED OR IRRITABLE: MORE THAN HALF THE DAYS

## 2025-08-25 ENCOUNTER — TELEPHONE (OUTPATIENT)
Dept: SURGERY | Facility: CLINIC | Age: 55
End: 2025-08-25
Payer: MEDICAID

## 2025-08-26 ENCOUNTER — TELEPHONE (OUTPATIENT)
Dept: SURGERY | Facility: CLINIC | Age: 55
End: 2025-08-26
Payer: MEDICAID

## 2025-08-26 ENCOUNTER — VIRTUAL VISIT (OUTPATIENT)
Dept: PSYCHOLOGY | Facility: CLINIC | Age: 55
End: 2025-08-26
Payer: MEDICAID

## 2025-08-26 ENCOUNTER — MYC MEDICAL ADVICE (OUTPATIENT)
Dept: SURGERY | Facility: CLINIC | Age: 55
End: 2025-08-26
Payer: MEDICAID

## 2025-08-26 DIAGNOSIS — F43.22 ADJUSTMENT DISORDER WITH ANXIOUS MOOD: Primary | ICD-10-CM

## 2025-08-26 DIAGNOSIS — F33.1 MAJOR DEPRESSIVE DISORDER, RECURRENT EPISODE, MODERATE (H): ICD-10-CM

## 2025-08-26 PROCEDURE — 90837 PSYTX W PT 60 MINUTES: CPT | Mod: 95 | Performed by: SOCIAL WORKER

## 2025-08-26 ASSESSMENT — PATIENT HEALTH QUESTIONNAIRE - PHQ9
SUM OF ALL RESPONSES TO PHQ QUESTIONS 1-9: 9
SUM OF ALL RESPONSES TO PHQ QUESTIONS 1-9: 9
10. IF YOU CHECKED OFF ANY PROBLEMS, HOW DIFFICULT HAVE THESE PROBLEMS MADE IT FOR YOU TO DO YOUR WORK, TAKE CARE OF THINGS AT HOME, OR GET ALONG WITH OTHER PEOPLE: EXTREMELY DIFFICULT